# Patient Record
Sex: MALE | Race: WHITE | NOT HISPANIC OR LATINO | Employment: OTHER | URBAN - NONMETROPOLITAN AREA
[De-identification: names, ages, dates, MRNs, and addresses within clinical notes are randomized per-mention and may not be internally consistent; named-entity substitution may affect disease eponyms.]

---

## 2023-03-27 DIAGNOSIS — E11.69 TYPE 2 DIABETES MELLITUS WITH OTHER SPECIFIED COMPLICATION, UNSPECIFIED WHETHER LONG TERM INSULIN USE (MULTI): Primary | ICD-10-CM

## 2023-03-27 NOTE — TELEPHONE ENCOUNTER
Patient stopped in to the office stating thst he needs his Free Style Librte 2 sensors sent to [facility] in [place]

## 2023-03-28 RX ORDER — FLASH GLUCOSE SENSOR
KIT MISCELLANEOUS
Qty: 1 EACH | Refills: 11 | Status: CANCELLED | OUTPATIENT
Start: 2023-03-28

## 2023-03-28 RX ORDER — FLASH GLUCOSE SENSOR
KIT MISCELLANEOUS AS NEEDED
COMMUNITY
Start: 2023-03-24 | End: 2023-04-07 | Stop reason: SDUPTHER

## 2023-04-07 RX ORDER — FLASH GLUCOSE SENSOR
1 KIT MISCELLANEOUS AS NEEDED
Qty: 1 EACH | Refills: 0 | Status: SHIPPED | OUTPATIENT
Start: 2023-04-07 | End: 2023-04-13 | Stop reason: SDUPTHER

## 2023-04-13 ENCOUNTER — OFFICE VISIT (OUTPATIENT)
Dept: PRIMARY CARE | Facility: CLINIC | Age: 73
End: 2023-04-13
Payer: MEDICARE

## 2023-04-13 VITALS
TEMPERATURE: 96.8 F | OXYGEN SATURATION: 99 % | HEART RATE: 96 BPM | BODY MASS INDEX: 27.55 KG/M2 | HEIGHT: 69 IN | DIASTOLIC BLOOD PRESSURE: 80 MMHG | RESPIRATION RATE: 18 BRPM | SYSTOLIC BLOOD PRESSURE: 122 MMHG | WEIGHT: 186 LBS

## 2023-04-13 DIAGNOSIS — D64.9 ANEMIA, UNSPECIFIED TYPE: ICD-10-CM

## 2023-04-13 DIAGNOSIS — Z00.00 HEALTH CARE MAINTENANCE: ICD-10-CM

## 2023-04-13 DIAGNOSIS — Z12.11 SCREENING FOR COLON CANCER: ICD-10-CM

## 2023-04-13 DIAGNOSIS — E11.69 TYPE 2 DIABETES MELLITUS WITH OTHER SPECIFIED COMPLICATION, UNSPECIFIED WHETHER LONG TERM INSULIN USE (MULTI): ICD-10-CM

## 2023-04-13 DIAGNOSIS — I10 ESSENTIAL HYPERTENSION: ICD-10-CM

## 2023-04-13 DIAGNOSIS — M10.9 GOUT, ARTHRITIS: ICD-10-CM

## 2023-04-13 DIAGNOSIS — Z95.1 S/P CABG (CORONARY ARTERY BYPASS GRAFT): Primary | ICD-10-CM

## 2023-04-13 DIAGNOSIS — I25.119 CORONARY ARTERY DISEASE INVOLVING NATIVE CORONARY ARTERY OF NATIVE HEART WITH ANGINA PECTORIS (CMS-HCC): ICD-10-CM

## 2023-04-13 PROCEDURE — 3074F SYST BP LT 130 MM HG: CPT | Performed by: INTERNAL MEDICINE

## 2023-04-13 PROCEDURE — 1159F MED LIST DOCD IN RCRD: CPT | Performed by: INTERNAL MEDICINE

## 2023-04-13 PROCEDURE — 3008F BODY MASS INDEX DOCD: CPT | Performed by: INTERNAL MEDICINE

## 2023-04-13 PROCEDURE — 3079F DIAST BP 80-89 MM HG: CPT | Performed by: INTERNAL MEDICINE

## 2023-04-13 PROCEDURE — 4010F ACE/ARB THERAPY RXD/TAKEN: CPT | Performed by: INTERNAL MEDICINE

## 2023-04-13 PROCEDURE — 1036F TOBACCO NON-USER: CPT | Performed by: INTERNAL MEDICINE

## 2023-04-13 PROCEDURE — 1160F RVW MEDS BY RX/DR IN RCRD: CPT | Performed by: INTERNAL MEDICINE

## 2023-04-13 PROCEDURE — 99214 OFFICE O/P EST MOD 30 MIN: CPT | Performed by: INTERNAL MEDICINE

## 2023-04-13 RX ORDER — ALLOPURINOL 100 MG/1
1 TABLET ORAL DAILY
COMMUNITY
Start: 2023-01-13 | End: 2023-04-14 | Stop reason: ALTCHOICE

## 2023-04-13 RX ORDER — ISOSORBIDE MONONITRATE 120 MG/1
1 TABLET, EXTENDED RELEASE ORAL DAILY
COMMUNITY
Start: 2021-01-25 | End: 2023-04-14 | Stop reason: ALTCHOICE

## 2023-04-13 RX ORDER — CLOPIDOGREL BISULFATE 75 MG/1
1 TABLET ORAL DAILY
COMMUNITY
Start: 2023-03-16 | End: 2023-12-18 | Stop reason: SDUPTHER

## 2023-04-13 RX ORDER — FLASH GLUCOSE SCANNING READER
EACH MISCELLANEOUS
Qty: 1 EACH | Refills: 0 | Status: SHIPPED | OUTPATIENT
Start: 2023-04-13

## 2023-04-13 RX ORDER — LOSARTAN POTASSIUM AND HYDROCHLOROTHIAZIDE 25; 100 MG/1; MG/1
1 TABLET ORAL DAILY
COMMUNITY
End: 2023-04-14 | Stop reason: ALTCHOICE

## 2023-04-13 RX ORDER — LISINOPRIL 40 MG/1
40 TABLET ORAL DAILY
COMMUNITY
Start: 2023-03-03 | End: 2023-07-27 | Stop reason: ALTCHOICE

## 2023-04-13 RX ORDER — METFORMIN HYDROCHLORIDE 1000 MG/1
TABLET ORAL 2 TIMES DAILY
COMMUNITY
Start: 2019-10-15 | End: 2023-08-14 | Stop reason: ALTCHOICE

## 2023-04-13 RX ORDER — INSULIN LISPRO 100 [IU]/ML
INJECTION, SOLUTION INTRAVENOUS; SUBCUTANEOUS
COMMUNITY
Start: 2023-01-23 | End: 2023-11-28 | Stop reason: SDUPTHER

## 2023-04-13 RX ORDER — FUROSEMIDE 40 MG/1
40 TABLET ORAL DAILY
COMMUNITY
End: 2023-04-14 | Stop reason: ALTCHOICE

## 2023-04-13 RX ORDER — FLASH GLUCOSE SENSOR
1 KIT MISCELLANEOUS AS NEEDED
Qty: 1 EACH | Refills: 0 | Status: SHIPPED | OUTPATIENT
Start: 2023-04-13 | End: 2023-11-03 | Stop reason: SDUPTHER

## 2023-04-13 RX ORDER — NAPROXEN SODIUM 220 MG/1
TABLET, FILM COATED ORAL DAILY
COMMUNITY
Start: 2022-10-18

## 2023-04-13 RX ORDER — METOPROLOL SUCCINATE 200 MG/1
1 TABLET, EXTENDED RELEASE ORAL DAILY
COMMUNITY
Start: 2023-03-16 | End: 2023-12-18 | Stop reason: SDUPTHER

## 2023-04-13 RX ORDER — COLCHICINE 0.6 MG/1
0.6 TABLET ORAL DAILY
COMMUNITY
Start: 2023-01-13 | End: 2023-04-14 | Stop reason: ALTCHOICE

## 2023-04-13 RX ORDER — ATORVASTATIN CALCIUM 80 MG/1
80 TABLET, FILM COATED ORAL NIGHTLY
COMMUNITY
Start: 2023-03-16 | End: 2023-10-26 | Stop reason: SDUPTHER

## 2023-04-13 RX ORDER — INSULIN GLARGINE 100 [IU]/ML
INJECTION, SOLUTION SUBCUTANEOUS
COMMUNITY
Start: 2023-01-23 | End: 2023-07-10

## 2023-04-13 ASSESSMENT — PAIN SCALES - GENERAL: PAINLEVEL: 0-NO PAIN

## 2023-04-13 NOTE — PROGRESS NOTES
"Subjective   Patient ID:   Patel Goncalves is a 73 y.o. male, history of DM II, HLD, HTN, who presents for routine follow up     CAD s/p CABG  - patient initially presented to ER on 10/17/22 with intermittent chest pain, ongoing for a week  - pain described as \"achy\", on right substernal chest pain, worsening on exertion and better with rest  - EKG at the time noted LBBB, however troponin was elevated to 156. CXR unremarkable, COVID test was negative, troponin was down to 113, concerning for prior cardiac event, patient was then hospitalized for concern of NSTEMI  - patient with echo noted LVEF 40-45%, impaired relaxation pattern of LV diastolic filling, with mild AV stenosis, Mild MR, TR, and SC, and noted global hypokinesis of the LV with minor regional variations  - had LCH by cardiologist, showed LM 60%, pLAD 70%, oD1 90%, mLAD  (R-->L collaterals, some L-->L collaterals), pLCx 70% mLCX , (no collaterals), OM1 90%\"   - as per Dr. Zazueta , CABG was recommended and patient transferred to [facility] on 10/18/22  - patient then admitted to the hospital on 11/8/22 , had CABG x4 LIMA-LAD, SVG-OM1-OM2, SVG to PLV and posterior pericardial window, performed by Dr. Trujlilo.   - on asa 81 mg every day  - on plavix 75 mg every day  - on atorvastatin 80 mg every day   - on metoprolol 200 mg every day   - on spironolactone 25 mg BID  - currently not taking lisinopril, although the medication was listed to be taken on last visit on 3/2023 by Dr. Bhakta     Iron deficiency  - on Ferrex 150 mg every day    DM II  - on metformin 1000 mg BID  - on Lantus 40 units at bedtime  - on insulin lispro 8 units with sliding scale   - have CGM however patient accidentally washed the reader in the washing machine  - currently it's broken and not working, although it was placed on gentle cycle     All other (10) organ systems have been reviewed, negative for significant complaint and no change from baseline other than mentioned as per HPI " above.    Patient Active Problem List   Diagnosis    Diabetes mellitus, type II (CMS/[facility])    Essential hypertension    Gout, arthritis    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 pectoris (CMS/[facility])    S/P CABG (coronary artery bypass graft)    Other hyperlipidemia    Health care maintenance    Anemia        History reviewed. No pertinent surgical history.    No family history on file.    Social History    reports that he has never smoked. He has never used smokeless tobacco. He reports that he does not currently use alcohol. He reports that he does not currently use drugs.  Allergies   Allergen Reactions    Ibuprofen Itching    Naproxen Sodium Itching       Medication Documentation Review Audit       Reviewed by Ghanshyam Burton MD (Physician) on 04/14/23 at 1944      Medication Order Taking? Sig Documenting Provider Last Dose Status   Discontinued 04/14/23 1933   aspirin 81 mg chewable tablet [id number] Yes Chew. Historical Provider, MD Taking Active   atorvastatin (Lipitor) 80 mg tablet [id number] Yes Take 1 tablet (80 mg) by mouth once daily at bedtime. Historical Provider, MD Taking Active   clopidogrel (Plavix) 75 mg tablet [id number] Yes Take 1 tablet (75 mg) by mouth once daily. Historical Provider, MD Taking Active   Discontinued 04/14/23 1934   Ferrex 150 150 mg iron capsule [id number] Yes Take 1 capsule (150 mg) by mouth once daily. Historical Provider, MD  Active   Discontinued 04/13/23 1225   FreeStyle Evy 2 Sensor kit [id number]  Inject 1 each under the skin if needed (glucose check). Ghanshyam Burton MD  Active   FreeStyle Evy reader (FreeStyle Evy 2 [place]) misc [id number]  Use as instructed Ghanshyam Burton MD  Active   Discontinued 04/14/23 1934   insulin lispro (HumaLOG) 100 unit/mL injection [id number] Yes INJECT 8 UNITS SUBCUTANEOUSLY 3 TIMES DAILY PLUS SLIDING SCALE. 151-200, 2 UNITS. 201-250, 4 UNITS. 251-300, 6 UNITS. 301-350, 8 UNITS. 351-400, 10  "UNITS. MAX MEAL DOSE 18 UNITS. MAX DAILY 54 UNITS Kizzy Cronin MD Taking Active   Discontinued 04/14/23 1934   Lantus Solostar U-100 Insulin 100 unit/mL (3 mL) pen [id number] Yes INJECT 35 UNITS SUBCUTANEOUSLY AT BEDTIME Kizzy Cronin MD Taking Active   lisinopril 40 mg tablet [id number]  Take 1 tablet (40 mg) by mouth once daily. for blood pressure Kizzy Cronin MD  Active   Discontinued 04/14/23 1934   metFORMIN (Glucophage) 1,000 mg tablet [id number] Yes Take by mouth twice a day. Kizzy Cronin MD Taking Active   metoprolol succinate XL (Toprol-XL) 200 mg 24 hr tablet [id number] Yes Take 1 tablet (200 mg) by mouth once daily. Kizzy Cronin MD Taking Active   spironolactone (Aldactone) 25 mg tablet [id number] Yes Take 1 tablet (25 mg) by mouth in the morning and 1 tablet (25 mg) before bedtime. Kizzy Cronin MD  Active                     Objective       5/9/2022     9:18 AM 10/4/2022     9:17 AM 10/11/2022    10:33 AM 12/2/2022    10:05 AM 12/21/2022     3:29 PM 12/21/2022     3:41 PM 4/13/2023    11:51 AM   Vitals   Systolic 180 142 136 145 185 161 122   Diastolic 100 88 84 89 110 90 80   Heart Rate 70  79 89 96  96   Temp 36.4 °C (97.6 °F) 36.2 °C (97.2 °F) 36.8 °C (98.2 °F)    36 °C (96.8 °F)   Resp  18 18    18   Height (in) 1.702 m (5' 7\") 1.702 m (5' 7\") 1.702 m (5' 7\") 1.702 m (5' 7\") 1.676 m (5' 6\")  1.753 m (5' 9\")   Weight (lb) 199.13 197 196.5 198.41 198.13  186   BMI 31.19 kg/m2 30.85 kg/m2 30.78 kg/m2 31.08 kg/m2 31.98 kg/m2  27.47 kg/m2   BSA (m2) 2.07 m2 2.06 m2 2.05 m2 2.06 m2 2.05 m2  2.03 m2   Visit Report       Report     Physical Exam  Constitutional:       General: He is not in acute distress.     Appearance: Normal appearance. He is not ill-appearing or toxic-appearing.   HENT:      Head: Normocephalic.   Eyes:      Extraocular Movements: Extraocular movements intact.      Conjunctiva/sclera: Conjunctivae normal.   Cardiovascular:      Rate and Rhythm: Normal " rate and regular rhythm.      Heart sounds: Normal heart sounds. No murmur heard.     No friction rub. No gallop.   Pulmonary:      Effort: Pulmonary effort is normal. No respiratory distress.      Breath sounds: Normal breath sounds. No stridor. No wheezing, rhonchi or rales.   Abdominal:      General: Abdomen is flat. There is no distension.      Palpations: Abdomen is soft.      Tenderness: There is no abdominal tenderness. There is no guarding.   Musculoskeletal:         General: Normal range of motion.      Right lower leg: No edema.      Left lower leg: No edema.   Skin:     General: Skin is warm and dry.   Neurological:      General: No focal deficit present.      Mental Status: He is alert. Mental status is at baseline.         Assessment/Plan     Patel Goncalves is a 73 y.o. male, history of DM II, HLD, HTN, who presents for routine follow up     Problem List Items Addressed This Visit       Diabetes mellitus, type II (CMS/HCC)      on metformin 1000 mg BID  - on Lantus 40 units at bedtime  - on insulin lispro 8 units with sliding scale   -   Lab Results   Component Value Date    HGBA1C 9.9 (A) 10/04/2022     - follow up A1C   - UACR elevated up to ~240s on 10/22, consider farxiga pending A1C level   - monofilament unremarkable on visit 10/4/22  - will trial of resent CGM   - f/u optometrist yearly          Relevant Medications    FreeStyle Evy reader (FreeStyle Evy 2 [place]) misc    FreeStyle Evy 2 Sensor kit    Other Relevant Orders    Hemoglobin A1c    Essential hypertension    Gout, arthritis     - Currently off medication   - shana observe          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 pectoris (CMS/HCC)    Relevant Medications    clopidogrel (Plavix) 75 mg tablet    metoprolol succinate XL (Toprol-XL) 200 mg 24 hr tablet    S/P CABG (coronary artery bypass graft) - Primary     - c/w asa 81 mg every day  - c/w plavix 75 mg every day  - c/w  atorvastatin 80 mg every day   - c/w metoprolol 200 mg every day   - c/w spironolactone 25 mg BID         Relevant Orders    CBC and Auto Differential    Comprehensive Metabolic Panel    Health care maintenance     MWV 5/2022, physical 10/4/22  Aspirin for primary/secondary prophylaxis: as above  Diabetes Screening/monitoring : ordered A1C  Lipid screening: LDL < 70 on lab 10/4/22  STD/HIV Screening: declined   Lung Cancer Screening: never smoker  Hepatitis C Screening: negative 10/2022  PSA discussion: out of age range  AAA Screening: never smoker  Colonoscopy: was done by Dr. Carey in [place], had small polyp removed, ordered   Vaccination: s/p 2x COVID , declined flu, pneumonia, shingrix, declined COVID booster          Anemia     - c/w iron supplement for now  - if remained to be anemic consider iron panel          Relevant Orders    CBC and Auto Differential     Other Visit Diagnoses       Screening for colon cancer        Relevant Orders    Colonoscopy          Follow up as noted below  Future Appointments   Date Time Provider Department Center   7/13/2023  8:10 AM Ghanshyam Burton MD [address]

## 2023-04-14 PROBLEM — D64.9 ANEMIA: Status: ACTIVE | Noted: 2023-04-14

## 2023-04-14 PROBLEM — Z00.00 HEALTH CARE MAINTENANCE: Status: ACTIVE | Noted: 2023-04-14

## 2023-04-14 PROBLEM — E78.49 OTHER HYPERLIPIDEMIA: Status: ACTIVE | Noted: 2023-04-14

## 2023-04-14 RX ORDER — IRON POLYSACCHARIDE COMPLEX 150 MG
1 CAPSULE ORAL DAILY
COMMUNITY
Start: 2022-11-13

## 2023-04-14 RX ORDER — SPIRONOLACTONE 25 MG/1
1 TABLET ORAL 2 TIMES DAILY
COMMUNITY
Start: 2023-03-03 | End: 2023-08-14 | Stop reason: ALTCHOICE

## 2023-04-14 NOTE — ASSESSMENT & PLAN NOTE
on metformin 1000 mg BID  - on Lantus 40 units at bedtime  - on insulin lispro 8 units with sliding scale   -   Lab Results   Component Value Date    HGBA1C 9.9 (A) 10/04/2022     - follow up A1C   - UACR elevated up to ~240s on 10/22, consider farxiga pending A1C level   - monofilament unremarkable on visit 10/4/22  - will trial of resent CGM   - f/u optometrist yearly

## 2023-04-14 NOTE — ASSESSMENT & PLAN NOTE
MWV 5/2022, physical 10/4/22  Aspirin for primary/secondary prophylaxis: as above  Diabetes Screening/monitoring : ordered A1C  Lipid screening: LDL < 70 on lab 10/4/22  STD/HIV Screening: declined   Lung Cancer Screening: never smoker  Hepatitis C Screening: negative 10/2022  PSA discussion: out of age range  AAA Screening: never smoker  Colonoscopy: was done by Dr. Carey in [place], had small polyp removed, ordered   Vaccination: s/p 2x COVID , declined flu, pneumonia, shingrix, declined COVID booster

## 2023-04-14 NOTE — ASSESSMENT & PLAN NOTE
- c/w asa 81 mg every day  - c/w plavix 75 mg every day  - c/w atorvastatin 80 mg every day   - c/w metoprolol 200 mg every day   - c/w spironolactone 25 mg BID

## 2023-07-09 DIAGNOSIS — Z79.4 TYPE 2 DIABETES MELLITUS WITH OTHER SPECIFIED COMPLICATION, WITH LONG-TERM CURRENT USE OF INSULIN (MULTI): Primary | ICD-10-CM

## 2023-07-09 DIAGNOSIS — E11.69 TYPE 2 DIABETES MELLITUS WITH OTHER SPECIFIED COMPLICATION, WITH LONG-TERM CURRENT USE OF INSULIN (MULTI): Primary | ICD-10-CM

## 2023-07-10 RX ORDER — INSULIN GLARGINE 100 [IU]/ML
INJECTION, SOLUTION SUBCUTANEOUS
Qty: 15 ML | Refills: 0 | Status: SHIPPED | OUTPATIENT
Start: 2023-07-10 | End: 2023-09-01 | Stop reason: SDUPTHER

## 2023-07-13 ENCOUNTER — OFFICE VISIT (OUTPATIENT)
Dept: PRIMARY CARE | Facility: CLINIC | Age: 73
End: 2023-07-13
Payer: MEDICARE

## 2023-07-13 VITALS
WEIGHT: 189 LBS | SYSTOLIC BLOOD PRESSURE: 120 MMHG | HEART RATE: 94 BPM | OXYGEN SATURATION: 98 % | RESPIRATION RATE: 18 BRPM | BODY MASS INDEX: 30.51 KG/M2 | DIASTOLIC BLOOD PRESSURE: 80 MMHG

## 2023-07-13 DIAGNOSIS — D64.9 ANEMIA, UNSPECIFIED TYPE: ICD-10-CM

## 2023-07-13 DIAGNOSIS — M10.9 GOUT, ARTHRITIS: ICD-10-CM

## 2023-07-13 DIAGNOSIS — E11.69 TYPE 2 DIABETES MELLITUS WITH OTHER SPECIFIED COMPLICATION, WITH LONG-TERM CURRENT USE OF INSULIN (MULTI): ICD-10-CM

## 2023-07-13 DIAGNOSIS — E78.49 OTHER HYPERLIPIDEMIA: ICD-10-CM

## 2023-07-13 DIAGNOSIS — Z79.4 TYPE 2 DIABETES MELLITUS WITH OTHER SPECIFIED COMPLICATION, WITH LONG-TERM CURRENT USE OF INSULIN (MULTI): ICD-10-CM

## 2023-07-13 DIAGNOSIS — Z00.00 HEALTH CARE MAINTENANCE: Primary | ICD-10-CM

## 2023-07-13 DIAGNOSIS — Z95.1 S/P CABG (CORONARY ARTERY BYPASS GRAFT): ICD-10-CM

## 2023-07-13 DIAGNOSIS — R00.0 TACHYCARDIA: ICD-10-CM

## 2023-07-13 PROBLEM — R80.9 PROTEINURIA: Status: ACTIVE | Noted: 2023-07-13

## 2023-07-13 PROBLEM — I50.43 ACUTE ON CHRONIC COMBINED SYSTOLIC AND DIASTOLIC HEART FAILURE (MULTI): Status: ACTIVE | Noted: 2023-07-13

## 2023-07-13 PROBLEM — S49.92XA INJURY OF LEFT SHOULDER: Status: ACTIVE | Noted: 2023-07-13

## 2023-07-13 PROBLEM — M25.562 LEFT KNEE PAIN: Status: ACTIVE | Noted: 2023-07-13

## 2023-07-13 PROBLEM — D50.9 IRON DEFICIENCY ANEMIA: Status: ACTIVE | Noted: 2023-07-13

## 2023-07-13 PROBLEM — S43.109A: Status: ACTIVE | Noted: 2023-07-13

## 2023-07-13 PROBLEM — I73.9 CLAUDICATION OF LOWER EXTREMITY (CMS-HCC): Status: ACTIVE | Noted: 2023-07-13

## 2023-07-13 PROBLEM — R79.89 ELEVATED SERUM CREATININE: Status: ACTIVE | Noted: 2023-07-13

## 2023-07-13 PROBLEM — M25.462 EFFUSION OF LEFT PREPATELLAR BURSA: Status: ACTIVE | Noted: 2023-07-13

## 2023-07-13 PROCEDURE — 3008F BODY MASS INDEX DOCD: CPT | Performed by: INTERNAL MEDICINE

## 2023-07-13 PROCEDURE — 99214 OFFICE O/P EST MOD 30 MIN: CPT | Performed by: INTERNAL MEDICINE

## 2023-07-13 PROCEDURE — 1159F MED LIST DOCD IN RCRD: CPT | Performed by: INTERNAL MEDICINE

## 2023-07-13 PROCEDURE — 3074F SYST BP LT 130 MM HG: CPT | Performed by: INTERNAL MEDICINE

## 2023-07-13 PROCEDURE — 1126F AMNT PAIN NOTED NONE PRSNT: CPT | Performed by: INTERNAL MEDICINE

## 2023-07-13 PROCEDURE — 1036F TOBACCO NON-USER: CPT | Performed by: INTERNAL MEDICINE

## 2023-07-13 PROCEDURE — 4010F ACE/ARB THERAPY RXD/TAKEN: CPT | Performed by: INTERNAL MEDICINE

## 2023-07-13 PROCEDURE — 1160F RVW MEDS BY RX/DR IN RCRD: CPT | Performed by: INTERNAL MEDICINE

## 2023-07-13 PROCEDURE — 3079F DIAST BP 80-89 MM HG: CPT | Performed by: INTERNAL MEDICINE

## 2023-07-13 RX ORDER — PEN NEEDLE, DIABETIC 30 GX3/16"
NEEDLE, DISPOSABLE MISCELLANEOUS
Qty: 400 EACH | Refills: 3 | Status: SHIPPED | OUTPATIENT
Start: 2023-07-13 | End: 2023-09-01 | Stop reason: SDUPTHER

## 2023-07-13 RX ORDER — LOSARTAN POTASSIUM 100 MG/1
100 TABLET ORAL DAILY
Qty: 30 TABLET | Refills: 5 | Status: SHIPPED | OUTPATIENT
Start: 2023-07-13 | End: 2023-07-27 | Stop reason: ALTCHOICE

## 2023-07-13 RX ORDER — PEN NEEDLE, DIABETIC 30 GX3/16"
NEEDLE, DISPOSABLE MISCELLANEOUS
COMMUNITY
Start: 2023-07-06 | End: 2023-07-13 | Stop reason: SDUPTHER

## 2023-07-13 RX ORDER — FUROSEMIDE 40 MG/1
1 TABLET ORAL DAILY
COMMUNITY
Start: 2022-11-13 | End: 2023-10-25 | Stop reason: SDUPTHER

## 2023-07-13 NOTE — PROGRESS NOTES
"Subjective   Patient ID:   Patel Goncalves is a 73 y.o. male, history of DM II, HLD, HTN, who presents for Follow-up    CAD s/p CABG  - patient initially presented to ER on 10/17/22 with intermittent chest pain, ongoing for a week  - pain described as \"achy\", on right substernal chest pain, worsening on exertion and better with rest  - EKG at the time noted LBBB, however troponin was elevated to 156. CXR unremarkable, COVID test was negative, troponin was down to 113, concerning for prior cardiac event, patient was then hospitalized for concern of NSTEMI  - patient with echo noted LVEF 40-45%, impaired relaxation pattern of LV diastolic filling, with mild AV stenosis, Mild MR, TR, and MS, and noted global hypokinesis of the LV with minor regional variations  - had LCH by cardiologist, showed LM 60%, pLAD 70%, oD1 90%, mLAD  (R-->L collaterals, some L-->L collaterals), pLCx 70% mLCX , (no collaterals), OM1 90%\"   - as per Dr. Zazueta , CABG was recommended and patient transferred to [facility] on 10/18/22  - patient then admitted to the hospital on 11/8/22 , had CABG x4 LIMA-LAD, SVG-OM1-OM2, SVG to PLV and posterior pericardial window, performed by Dr. Trujillo.   - on asa 81 mg every day  - on plavix 75 mg every day  - on atorvastatin 80 mg every day   - on metoprolol 200 mg every day   - on spironolactone 25 mg BID  - on furosemide 40 mg every day , instructed can taper every other day   - on lisinopril 40 mg every day , however patient c/o dry cough   - last visit on 6/23, by Dr. Bhakta , follow up in 1 year     HLD  - on atorvastatin 80 mg every day     Intermittent tachycardia  - currently on ziopatch, ordered by cardiologist, been on for a week    Iron deficiency  - on Ferrex 150 mg every day    DM II  - on metformin 1000 mg BID  - on Lantus 40 units at bedtime, most of the time, occasionally skipped as patient concern of hypoglycemia  - on insulin lispro 8 units with sliding scale , take three times a day with " meal, however occasionally skipped or taken too late, as patient doesn't always have his pen with him  - average in the past 14 days in 165  -  the last 14 days patient with 2 low glucose event (<50)  - in the past 14 days patient in target 62%, patient is above 37%, below at 1% of the time    All other (10) organ systems have been reviewed, negative for significant complaint and no change from baseline other than mentioned as per HPI above.    Patient Active Problem List   Diagnosis    Diabetes mellitus, type II (CMS/[facility])    Gout, arthritis    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 pectoris (CMS/[facility])    S/P CABG (coronary artery bypass graft)    Other hyperlipidemia    Health care maintenance    Anemia    Acute on chronic combined systolic and diastolic heart failure (CMS/[facility])    Claudication of lower extremity (CMS/[facility])    Effusion of left prepatellar bursa    Elevated serum creatinine    Injury of left shoulder    Iron deficiency anemia    Left knee pain    Proteinuria    Separation of acromioclavicular joint    Tachycardia        History reviewed. No pertinent surgical history.    No family history on file.    Social History    reports that he has never smoked. He has never used smokeless tobacco. He reports that he does not currently use alcohol. He reports that he does not currently use drugs.  Allergies   Allergen Reactions    Ibuprofen Itching    Naproxen Sodium Itching       Medication Documentation Review Audit       Reviewed by Ghanshyam Burton MD (Physician) on 07/13/23 at 0942      Medication Order Taking? Sig Documenting Provider Last Dose Status   aspirin 81 mg chewable tablet [id number] Yes Chew. Historical Provider, MD Taking Active   atorvastatin (Lipitor) 80 mg tablet [id number] Yes Take 1 tablet (80 mg) by mouth once daily at bedtime. Historical Provider, MD Taking Active   clopidogrel (Plavix) 75 mg tablet [id number] Yes Take 1 tablet (75 mg) by mouth  "once daily. Kizzy Cronin MD Taking Active   Ferrex 150 150 mg iron capsule [id number] Yes Take 1 capsule (150 mg) by mouth once daily. Kizzy Cronin MD Taking Active   FreeStyle Evy 2 Sensor kit [id number] Yes Inject 1 each under the skin if needed (glucose check). Ghanshyam Burton MD Taking Active   FreeStyle Evy reader (FreeStyle Evy 2 [place]) misc [id number] Yes Use as instructed Ghanshyam Burton MD Taking Active   furosemide (Lasix) 40 mg tablet [id number] Yes Take 1 tablet (40 mg) by mouth once daily. Kizzy rConin MD  Active   insulin lispro (HumaLOG) 100 unit/mL injection [id number] Yes INJECT 8 UNITS SUBCUTANEOUSLY 3 TIMES DAILY PLUS SLIDING SCALE. 151-200, 2 UNITS. 201-250, 4 UNITS. 251-300, 6 UNITS. 301-350, 8 UNITS. 351-400, 10 UNITS. MAX MEAL DOSE 18 UNITS. MAX DAILY 54 UNITS Kizzy Cronin MD Taking Active     Discontinued 07/10/23 0814   Lantus Solostar U-100 Insulin 100 unit/mL (3 mL) pen [id number] Yes INJECT 35 UNITS SUBCUTANEOUSLY AT BEDTIME Ghanshyam Burton MD Taking Active   lisinopril 40 mg tablet [id number] Yes Take 1 tablet (40 mg) by mouth once daily. for blood pressure Kizzy Cronin MD Taking Active   losartan (Cozaar) 100 mg tablet [id number]  Take 1 tablet (100 mg) by mouth once daily. Ghanshyam Burton MD  Active   metFORMIN (Glucophage) 1,000 mg tablet [id number] Yes Take by mouth twice a day. Kizzy Cronin MD Taking Active   metoprolol succinate XL (Toprol-XL) 200 mg 24 hr tablet [id number] Yes Take 1 tablet (200 mg) by mouth once daily. Kizzy Cronin MD Taking Active    Discontinued 07/13/23 0850   pen needle, diabetic 31 gauge x 5/16\" needle [id number]  Use with insulin for DM II Ghanshyam Burton MD  Active   spironolactone (Aldactone) 25 mg tablet [id number] Yes Take 1 tablet (25 mg) by mouth 2 times a day. Kizzy Cronin MD Taking Active                     Objective       1/6/2023    10:52 AM 1/13/2023     1:01 PM 1/20/2023     2:21 PM " "3/3/2023    10:09 AM 4/13/2023    11:51 AM 5/26/2023     8:51 AM 7/13/2023     8:11 AM   Vitals   Systolic 139 151 156 152 122 129 120   Diastolic 92 87 98 87 80 79 80   Heart Rate 96 86 88 84 96 81 94   Temp     36 °C (96.8 °F)     Resp     18  18   Height (in) 1.676 m (5' 6\") 1.676 m (5' 6\") 1.676 m (5' 6\") 1.676 m (5' 6\") 1.753 m (5' 9\") 1.676 m (5' 6\")    Weight (lb) 201.5 201.72 194.22 186.73 186 181.22 189   BMI 32.52 kg/m2 32.56 kg/m2 31.35 kg/m2 30.14 kg/m2 27.47 kg/m2 29.25 kg/m2 30.51 kg/m2   BSA (m2) 2.06 m2 2.06 m2 2.03 m2 1.99 m2 2.03 m2 1.96 m2 2 m2   Visit Report     Report  Report     Physical Exam  Constitutional:       General: He is not in acute distress.     Appearance: Normal appearance. He is not ill-appearing or toxic-appearing.   HENT:      Head: Normocephalic.   Eyes:      Extraocular Movements: Extraocular movements intact.      Conjunctiva/sclera: Conjunctivae normal.   Cardiovascular:      Rate and Rhythm: Normal rate and regular rhythm.      Heart sounds: Normal heart sounds. No murmur heard.     No friction rub. No gallop.   Pulmonary:      Effort: Pulmonary effort is normal. No respiratory distress.      Breath sounds: Normal breath sounds. No stridor. No wheezing, rhonchi or rales.   Abdominal:      General: Abdomen is flat. There is no distension.      Palpations: Abdomen is soft.      Tenderness: There is no abdominal tenderness. There is no guarding.   Musculoskeletal:         General: Normal range of motion.      Right lower leg: No edema.      Left lower leg: No edema.   Skin:     General: Skin is warm and dry.   Neurological:      General: No focal deficit present.      Mental Status: He is alert. Mental status is at baseline.         Assessment/Plan     Patel Goncalves is a 73 y.o. male, history of DM II, HLD, HTN, who presents for routine follow up     Problem List Items Addressed This Visit       Diabetes mellitus, type II (CMS/HCC)      on metformin 1000 mg BID  - on " "Lantus 40 units at bedtime  - on insulin lispro 8 units with sliding scale   - reviewed CGM, advised to take short acting lispro more consistently, patient with good fasting glucose but with spikes  Lab Results   Component Value Date    HGBA1C 9.9 (A) 10/04/2022   - follow up A1C   - UACR elevated up to ~240s on 10/22, consider farxiga pending A1C level   - monofilament unremarkable on visit 10/4/22  - c/w CGM  - f/u optometrist yearly          Relevant Medications    pen needle, diabetic 31 gauge x 5/16\" needle    Other Relevant Orders    Hemoglobin A1c    CBC and Auto Differential    Comprehensive Metabolic Panel    Gout, arthritis     - Currently off medication   - shana observe   - follow up uric acid level          Relevant Orders    Uric acid    S/P CABG (coronary artery bypass graft)     - on asa 81 mg every day  - on plavix 75 mg every day  - on atorvastatin 80 mg every day   - on metoprolol 200 mg every day   - on spironolactone 25 mg BID  - will switch lisinopril 40 mg every day to losartan 100 mg every day because of cough  - will check CMP as noted below   - advised to follow up with cardiologist, Dr. Bhakta, last seen 6/2023, advised to follow up in 1 year   - can taper off furosemide 40 mg every day as instructed by Dr. Bhakta         Relevant Medications    losartan (Cozaar) 100 mg tablet    Other Relevant Orders    CBC and Auto Differential    Comprehensive Metabolic Panel    Other hyperlipidemia     - c/w atorvastatin 80 mg every day   - follow up lipid panel          Relevant Orders    Lipid Panel    Health care maintenance - Primary     MWV 5/2022, physical 10/4/22  Aspirin for primary/secondary prophylaxis: on DAPT  Diabetes Screening/monitoring : ordered A1C  Lipid screening: ordered  STD/HIV Screening: declined   Lung Cancer Screening: never smoker  Hepatitis C Screening: negative 10/2022  PSA discussion: out of age range  AAA Screening: never smoker  Colonoscopy: was done by Dr. Carey in [place], " had small polyp removed, ordered  on prior visit   Vaccination: s/p 2x COVID , declined flu, pneumonia, shingrix, declined COVID booster          Anemia     - c/w iron supplement for now  Lab Results   Component Value Date    WBC 8.4 11/30/2022    HGB 12.6 (L) 11/30/2022    HCT 41.3 11/30/2022    MCV 91 11/30/2022     11/30/2022   - follow up iron panel , B12, and folate as noted below          Relevant Orders    Vitamin B12    Folate    Ferritin    Iron and TIBC    Tachycardia     Currently on ziopatch  Regular on exam  Advised to follow up with cardiology           Follow advised in 3 months   No future appointments.

## 2023-07-13 NOTE — ASSESSMENT & PLAN NOTE
- on asa 81 mg every day  - on plavix 75 mg every day  - on atorvastatin 80 mg every day   - on metoprolol 200 mg every day   - on spironolactone 25 mg BID  - will switch lisinopril 40 mg every day to losartan 100 mg every day because of cough  - will check CMP as noted below   - advised to follow up with cardiologist, Dr. Bhakta, last seen 6/2023, advised to follow up in 1 year   - can taper off furosemide 40 mg every day as instructed by Dr. Bhakta

## 2023-07-13 NOTE — ASSESSMENT & PLAN NOTE
- c/w iron supplement for now  Lab Results   Component Value Date    WBC 8.4 11/30/2022    HGB 12.6 (L) 11/30/2022    HCT 41.3 11/30/2022    MCV 91 11/30/2022     11/30/2022   - follow up iron panel , B12, and folate as noted below

## 2023-07-13 NOTE — ASSESSMENT & PLAN NOTE
MWV 5/2022, physical 10/4/22  Aspirin for primary/secondary prophylaxis: on DAPT  Diabetes Screening/monitoring : ordered A1C  Lipid screening: ordered  STD/HIV Screening: declined   Lung Cancer Screening: never smoker  Hepatitis C Screening: negative 10/2022  PSA discussion: out of age range  AAA Screening: never smoker  Colonoscopy: was done by Dr. Carey in [place], had small polyp removed, ordered  on prior visit   Vaccination: s/p 2x COVID , declined flu, pneumonia, shingrix, declined COVID booster

## 2023-07-13 NOTE — PATIENT INSTRUCTIONS
I recommend blood work, please get it done in [facility], it will be in the system.   Stop taking lisinopril, this is the most likely reason for your cough, I will send replacement called Losartan (it will be different dose), however it is very similar to lisinopril except without the cough; make sure you don't take losartan and lisinopril together (it has potential for kidney failure)  Colonoscopy order is in place, please call and schedule  No change in insulin regimen

## 2023-07-13 NOTE — ASSESSMENT & PLAN NOTE
on metformin 1000 mg BID  - on Lantus 40 units at bedtime  - on insulin lispro 8 units with sliding scale   - reviewed CGM, advised to take short acting lispro more consistently, patient with good fasting glucose but with spikes  Lab Results   Component Value Date    HGBA1C 9.9 (A) 10/04/2022   - follow up A1C   - UACR elevated up to ~240s on 10/22, consider farxiga pending A1C level   - monofilament unremarkable on visit 10/4/22  - c/w CGM  - f/u optometrist yearly

## 2023-07-18 ENCOUNTER — LAB (OUTPATIENT)
Dept: LAB | Facility: LAB | Age: 73
End: 2023-07-18
Payer: MEDICARE

## 2023-07-18 DIAGNOSIS — M10.9 GOUT, ARTHRITIS: ICD-10-CM

## 2023-07-18 DIAGNOSIS — Z79.4 TYPE 2 DIABETES MELLITUS WITH OTHER SPECIFIED COMPLICATION, WITH LONG-TERM CURRENT USE OF INSULIN (MULTI): ICD-10-CM

## 2023-07-18 DIAGNOSIS — E11.69 TYPE 2 DIABETES MELLITUS WITH OTHER SPECIFIED COMPLICATION, WITH LONG-TERM CURRENT USE OF INSULIN (MULTI): ICD-10-CM

## 2023-07-18 DIAGNOSIS — Z95.1 S/P CABG (CORONARY ARTERY BYPASS GRAFT): ICD-10-CM

## 2023-07-18 DIAGNOSIS — E78.49 OTHER HYPERLIPIDEMIA: ICD-10-CM

## 2023-07-18 DIAGNOSIS — D64.9 ANEMIA, UNSPECIFIED TYPE: ICD-10-CM

## 2023-07-18 DIAGNOSIS — E11.69 TYPE 2 DIABETES MELLITUS WITH OTHER SPECIFIED COMPLICATION, UNSPECIFIED WHETHER LONG TERM INSULIN USE (MULTI): ICD-10-CM

## 2023-07-18 DIAGNOSIS — N18.9 CHRONIC KIDNEY DISEASE, UNSPECIFIED CKD STAGE: Primary | ICD-10-CM

## 2023-07-18 LAB
ALANINE AMINOTRANSFERASE (SGPT) (U/L) IN SER/PLAS: 25 U/L (ref 10–52)
ALBUMIN (G/DL) IN SER/PLAS: 4.2 G/DL (ref 3.4–5)
ALKALINE PHOSPHATASE (U/L) IN SER/PLAS: 60 U/L (ref 33–136)
ANION GAP IN SER/PLAS: 16 MMOL/L (ref 10–20)
ASPARTATE AMINOTRANSFERASE (SGOT) (U/L) IN SER/PLAS: 18 U/L (ref 9–39)
BASOPHILS (10*3/UL) IN BLOOD BY AUTOMATED COUNT: 0.02 X10E9/L (ref 0–0.1)
BASOPHILS/100 LEUKOCYTES IN BLOOD BY AUTOMATED COUNT: 0.3 % (ref 0–2)
BILIRUBIN TOTAL (MG/DL) IN SER/PLAS: 0.4 MG/DL (ref 0–1.2)
CALCIUM (MG/DL) IN SER/PLAS: 10 MG/DL (ref 8.6–10.3)
CARBON DIOXIDE, TOTAL (MMOL/L) IN SER/PLAS: 18 MMOL/L (ref 21–32)
CHLORIDE (MMOL/L) IN SER/PLAS: 110 MMOL/L (ref 98–107)
CHOLESTEROL (MG/DL) IN SER/PLAS: 86 MG/DL (ref 0–199)
CHOLESTEROL IN HDL (MG/DL) IN SER/PLAS: 36.2 MG/DL
CHOLESTEROL/HDL RATIO: 2.4
CREATININE (MG/DL) IN SER/PLAS: 1.71 MG/DL (ref 0.5–1.3)
EOSINOPHILS (10*3/UL) IN BLOOD BY AUTOMATED COUNT: 0.2 X10E9/L (ref 0–0.4)
EOSINOPHILS/100 LEUKOCYTES IN BLOOD BY AUTOMATED COUNT: 2.9 % (ref 0–6)
ERYTHROCYTE DISTRIBUTION WIDTH (RATIO) BY AUTOMATED COUNT: 13.5 % (ref 11.5–14.5)
ERYTHROCYTE MEAN CORPUSCULAR HEMOGLOBIN CONCENTRATION (G/DL) BY AUTOMATED: 31.1 G/DL (ref 32–36)
ERYTHROCYTE MEAN CORPUSCULAR VOLUME (FL) BY AUTOMATED COUNT: 97 FL (ref 80–100)
ERYTHROCYTES (10*6/UL) IN BLOOD BY AUTOMATED COUNT: 3.99 X10E12/L (ref 4.5–5.9)
FERRITIN (UG/LL) IN SER/PLAS: 124 UG/L (ref 20–300)
GFR MALE: 41 ML/MIN/1.73M2
GLUCOSE (MG/DL) IN SER/PLAS: 88 MG/DL (ref 74–99)
HEMATOCRIT (%) IN BLOOD BY AUTOMATED COUNT: 38.6 % (ref 41–52)
HEMOGLOBIN (G/DL) IN BLOOD: 12 G/DL (ref 13.5–17.5)
IMMATURE GRANULOCYTES/100 LEUKOCYTES IN BLOOD BY AUTOMATED COUNT: 0.3 % (ref 0–0.9)
IRON (UG/DL) IN SER/PLAS: 92 UG/DL (ref 35–150)
IRON BINDING CAPACITY (UG/DL) IN SER/PLAS: 298 UG/DL (ref 240–445)
IRON SATURATION (%) IN SER/PLAS: 31 % (ref 25–45)
LDL: 19 MG/DL (ref 0–99)
LEUKOCYTES (10*3/UL) IN BLOOD BY AUTOMATED COUNT: 6.9 X10E9/L (ref 4.4–11.3)
LYMPHOCYTES (10*3/UL) IN BLOOD BY AUTOMATED COUNT: 1.25 X10E9/L (ref 0.8–3)
LYMPHOCYTES/100 LEUKOCYTES IN BLOOD BY AUTOMATED COUNT: 18.1 % (ref 13–44)
MONOCYTES (10*3/UL) IN BLOOD BY AUTOMATED COUNT: 0.72 X10E9/L (ref 0.05–0.8)
MONOCYTES/100 LEUKOCYTES IN BLOOD BY AUTOMATED COUNT: 10.4 % (ref 2–10)
NEUTROPHILS (10*3/UL) IN BLOOD BY AUTOMATED COUNT: 4.71 X10E9/L (ref 1.6–5.5)
NEUTROPHILS/100 LEUKOCYTES IN BLOOD BY AUTOMATED COUNT: 68 % (ref 40–80)
PLATELETS (10*3/UL) IN BLOOD AUTOMATED COUNT: 171 X10E9/L (ref 150–450)
POTASSIUM (MMOL/L) IN SER/PLAS: 5.4 MMOL/L (ref 3.5–5.3)
PROTEIN TOTAL: 6.6 G/DL (ref 6.4–8.2)
SODIUM (MMOL/L) IN SER/PLAS: 139 MMOL/L (ref 136–145)
TRIGLYCERIDE (MG/DL) IN SER/PLAS: 155 MG/DL (ref 0–149)
URATE (MG/DL) IN SER/PLAS: 8.1 MG/DL (ref 4–7.5)
UREA NITROGEN (MG/DL) IN SER/PLAS: 43 MG/DL (ref 6–23)
VLDL: 31 MG/DL (ref 0–40)

## 2023-07-18 PROCEDURE — 83036 HEMOGLOBIN GLYCOSYLATED A1C: CPT

## 2023-07-18 PROCEDURE — 83540 ASSAY OF IRON: CPT

## 2023-07-18 PROCEDURE — 82746 ASSAY OF FOLIC ACID SERUM: CPT

## 2023-07-18 PROCEDURE — 83550 IRON BINDING TEST: CPT

## 2023-07-18 PROCEDURE — 82728 ASSAY OF FERRITIN: CPT

## 2023-07-18 PROCEDURE — 85025 COMPLETE CBC W/AUTO DIFF WBC: CPT

## 2023-07-18 PROCEDURE — 36415 COLL VENOUS BLD VENIPUNCTURE: CPT

## 2023-07-18 PROCEDURE — 84550 ASSAY OF BLOOD/URIC ACID: CPT

## 2023-07-18 PROCEDURE — 80053 COMPREHEN METABOLIC PANEL: CPT

## 2023-07-18 PROCEDURE — 80061 LIPID PANEL: CPT

## 2023-07-18 PROCEDURE — 82607 VITAMIN B-12: CPT

## 2023-07-19 LAB
COBALAMIN (VITAMIN B12) (PG/ML) IN SER/PLAS: 1072 PG/ML (ref 211–911)
ESTIMATED AVERAGE GLUCOSE FOR HBA1C: 148 MG/DL
FOLATE (NG/ML) IN SER/PLAS: >24 NG/ML
HEMOGLOBIN A1C/HEMOGLOBIN TOTAL IN BLOOD: 6.8 %

## 2023-07-19 NOTE — RESULT ENCOUNTER NOTE
Patient has significantly worsening kidney function, this needs to be repeated within a week, I ordered the lab, as well as referral to nephrology , uric acid is a bit high, but because of his decrease of kidney function I don't want to add any new medication at this time, his B12, Folate, and iron panel are normal, the anemia is stable, likely the anemia is from kidney disease, I advise to avoid NSAIDs (ibuprofen such as motrin/advil, naproxen such as aleve), please taper off the lasix (if not already done), patient should be seen by PCP in about a month

## 2023-07-20 PROBLEM — I73.9 CLAUDICATION OF LOWER EXTREMITY (CMS-HCC): Status: RESOLVED | Noted: 2023-07-13 | Resolved: 2023-07-20

## 2023-07-20 PROBLEM — I50.43 ACUTE ON CHRONIC COMBINED SYSTOLIC AND DIASTOLIC HEART FAILURE (MULTI): Status: RESOLVED | Noted: 2023-07-13 | Resolved: 2023-07-20

## 2023-07-20 NOTE — PROGRESS NOTES
Subjective   Patient ID:   Patel Goncalves is a 73 y.o. male who presents for Establish Care.  HPI  New patient here today to establish care with myself.  Last PCP: Dr. Burton  Last seen: Nov 2022    Elevated creatinine:  Creatinine was high at 1.71 on 7/18.  Needs this repeated.  Referred to nephrology - cannot get in until November.  DUE for labs.    Diabetes:  Taking Metformin, Lantus, Humalog.  Last A1c was 6.8 in July 2023.    CAD/HTN/HLD:  Seeing cardiology.  Taking Atorvastatin, Plavix, Lasix, Losartan, Metoprolol, Spironolactone.  BP today is 131/71.  Last lipid was July 2023.    Iron deficiency anemia:  Taking a supplement.  Last checked July 2023.    Left shoulder pain:  Seeing ortho.    Health maintenance:  Smoking: Never a smoker.  PSA (50+): DUE - declines  Labs: July 2023. DUE for CMP  Colonoscopy (50-75): April 2023.  Prevnar 13 (65+):  Pneumovax 23 (65+, 1 year after Prev 13):  Influenza:  Zostavax (60+, 1 time, at pharmacy):    Review of Systems  12 point review of systems negative unless stated above in HPI    Vitals:    07/27/23 1441   BP: 131/71   Pulse: 65   Resp: 18   SpO2: 97%       Physical Exam  General: Alert and oriented, well nourished, no acute distress.  Lungs: Clear to auscultation, non-labored respiration.  Heart: Normal rate, regular rhythm, no murmur, gallop or edema.  Neurologic: Awake, alert, and oriented X3, CN II-XII intact.  Psychiatric: Cooperative, appropriate mood and affect.    Assessment/Plan   It was nice meeting you!  I have ordered some labs to be done as soon as you can.  We will call you with the results.  Continue specialty care.  We may need to get you established with a nephrologist.  Continue the same medications.  Chronic conditions are stable.  Call with questions or concerns.    F/u  3-4 months  Diagnoses and all orders for this visit:  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 pectoris (CMS/HCC)  Type 2 diabetes  mellitus without complication, with long-term current use of insulin (CMS/[facility])  Iron deficiency anemia, unspecified iron deficiency anemia type  Other hyperlipidemia  Separation of left acromioclavicular joint, subsequent encounter  S/P CABG (coronary artery bypass graft)  Injury of left shoulder, subsequent encounter  Elevated serum creatinine  -     Comprehensive Metabolic Panel; Future  Screening PSA (prostate specific antigen)  Morbid (severe) obesity due to excess calories (CMS/[facility])

## 2023-07-24 ENCOUNTER — APPOINTMENT (OUTPATIENT)
Dept: PRIMARY CARE | Facility: CLINIC | Age: 73
End: 2023-07-24
Payer: MEDICARE

## 2023-07-27 ENCOUNTER — OFFICE VISIT (OUTPATIENT)
Dept: PRIMARY CARE | Facility: CLINIC | Age: 73
End: 2023-07-27
Payer: MEDICARE

## 2023-07-27 VITALS
WEIGHT: 190.8 LBS | RESPIRATION RATE: 18 BRPM | HEART RATE: 65 BPM | BODY MASS INDEX: 30.67 KG/M2 | HEIGHT: 66 IN | SYSTOLIC BLOOD PRESSURE: 131 MMHG | OXYGEN SATURATION: 97 % | DIASTOLIC BLOOD PRESSURE: 71 MMHG

## 2023-07-27 DIAGNOSIS — R79.89 ELEVATED SERUM CREATININE: ICD-10-CM

## 2023-07-27 DIAGNOSIS — Z95.1 S/P CABG (CORONARY ARTERY BYPASS GRAFT): ICD-10-CM

## 2023-07-27 DIAGNOSIS — E11.9 TYPE 2 DIABETES MELLITUS WITHOUT COMPLICATION, WITH LONG-TERM CURRENT USE OF INSULIN (MULTI): ICD-10-CM

## 2023-07-27 DIAGNOSIS — Z12.5 SCREENING PSA (PROSTATE SPECIFIC ANTIGEN): ICD-10-CM

## 2023-07-27 DIAGNOSIS — Z79.4 TYPE 2 DIABETES MELLITUS WITHOUT COMPLICATION, WITH LONG-TERM CURRENT USE OF INSULIN (MULTI): ICD-10-CM

## 2023-07-27 DIAGNOSIS — S43.102D SEPARATION OF LEFT ACROMIOCLAVICULAR JOINT, SUBSEQUENT ENCOUNTER: ICD-10-CM

## 2023-07-27 DIAGNOSIS — D50.9 IRON DEFICIENCY ANEMIA, UNSPECIFIED IRON DEFICIENCY ANEMIA TYPE: ICD-10-CM

## 2023-07-27 DIAGNOSIS — E78.49 OTHER HYPERLIPIDEMIA: ICD-10-CM

## 2023-07-27 DIAGNOSIS — I25.119 CORONARY ARTERY DISEASE INVOLVING NATIVE CORONARY ARTERY OF NATIVE HEART WITH ANGINA PECTORIS (CMS-HCC): Primary | ICD-10-CM

## 2023-07-27 DIAGNOSIS — E66.01 MORBID (SEVERE) OBESITY DUE TO EXCESS CALORIES (MULTI): ICD-10-CM

## 2023-07-27 DIAGNOSIS — S49.92XD INJURY OF LEFT SHOULDER, SUBSEQUENT ENCOUNTER: ICD-10-CM

## 2023-07-27 PROCEDURE — 3075F SYST BP GE 130 - 139MM HG: CPT | Performed by: PHYSICIAN ASSISTANT

## 2023-07-27 PROCEDURE — 1036F TOBACCO NON-USER: CPT | Performed by: PHYSICIAN ASSISTANT

## 2023-07-27 PROCEDURE — 1126F AMNT PAIN NOTED NONE PRSNT: CPT | Performed by: PHYSICIAN ASSISTANT

## 2023-07-27 PROCEDURE — 3044F HG A1C LEVEL LT 7.0%: CPT | Performed by: PHYSICIAN ASSISTANT

## 2023-07-27 PROCEDURE — 4010F ACE/ARB THERAPY RXD/TAKEN: CPT | Performed by: PHYSICIAN ASSISTANT

## 2023-07-27 PROCEDURE — 99213 OFFICE O/P EST LOW 20 MIN: CPT | Performed by: PHYSICIAN ASSISTANT

## 2023-07-27 PROCEDURE — 1159F MED LIST DOCD IN RCRD: CPT | Performed by: PHYSICIAN ASSISTANT

## 2023-07-27 PROCEDURE — 3008F BODY MASS INDEX DOCD: CPT | Performed by: PHYSICIAN ASSISTANT

## 2023-07-27 PROCEDURE — 3078F DIAST BP <80 MM HG: CPT | Performed by: PHYSICIAN ASSISTANT

## 2023-07-27 PROCEDURE — 1160F RVW MEDS BY RX/DR IN RCRD: CPT | Performed by: PHYSICIAN ASSISTANT

## 2023-07-27 RX ORDER — LOSARTAN POTASSIUM 100 MG/1
100 TABLET ORAL DAILY
COMMUNITY
End: 2023-08-14 | Stop reason: ALTCHOICE

## 2023-07-27 RX ORDER — MULTIVITAMIN
1 TABLET ORAL DAILY
COMMUNITY

## 2023-07-27 ASSESSMENT — PATIENT HEALTH QUESTIONNAIRE - PHQ9
10. IF YOU CHECKED OFF ANY PROBLEMS, HOW DIFFICULT HAVE THESE PROBLEMS MADE IT FOR YOU TO DO YOUR WORK, TAKE CARE OF THINGS AT HOME, OR GET ALONG WITH OTHER PEOPLE: NOT DIFFICULT AT ALL
SUM OF ALL RESPONSES TO PHQ9 QUESTIONS 1 AND 2: 2
2. FEELING DOWN, DEPRESSED OR HOPELESS: SEVERAL DAYS
1. LITTLE INTEREST OR PLEASURE IN DOING THINGS: SEVERAL DAYS

## 2023-07-27 ASSESSMENT — PAIN SCALES - GENERAL: PAINLEVEL: 0-NO PAIN

## 2023-08-02 ENCOUNTER — LAB (OUTPATIENT)
Dept: LAB | Facility: LAB | Age: 73
End: 2023-08-02
Payer: MEDICARE

## 2023-08-02 DIAGNOSIS — R79.89 ELEVATED SERUM CREATININE: ICD-10-CM

## 2023-08-02 DIAGNOSIS — N18.9 CHRONIC KIDNEY DISEASE, UNSPECIFIED CKD STAGE: ICD-10-CM

## 2023-08-02 PROCEDURE — 84100 ASSAY OF PHOSPHORUS: CPT

## 2023-08-02 PROCEDURE — 80053 COMPREHEN METABOLIC PANEL: CPT

## 2023-08-02 PROCEDURE — 36415 COLL VENOUS BLD VENIPUNCTURE: CPT

## 2023-08-03 LAB
ALANINE AMINOTRANSFERASE (SGPT) (U/L) IN SER/PLAS: 25 U/L (ref 10–52)
ALBUMIN (G/DL) IN SER/PLAS: 4.9 G/DL (ref 3.4–5)
ALKALINE PHOSPHATASE (U/L) IN SER/PLAS: 63 U/L (ref 33–136)
ANION GAP IN SER/PLAS: 15 MMOL/L (ref 10–20)
ASPARTATE AMINOTRANSFERASE (SGOT) (U/L) IN SER/PLAS: 20 U/L (ref 9–39)
BILIRUBIN TOTAL (MG/DL) IN SER/PLAS: 0.4 MG/DL (ref 0–1.2)
CALCIUM (MG/DL) IN SER/PLAS: 10.7 MG/DL (ref 8.6–10.3)
CARBON DIOXIDE, TOTAL (MMOL/L) IN SER/PLAS: 17 MMOL/L (ref 21–32)
CHLORIDE (MMOL/L) IN SER/PLAS: 109 MMOL/L (ref 98–107)
CREATININE (MG/DL) IN SER/PLAS: 2.14 MG/DL (ref 0.5–1.3)
GFR MALE: 32 ML/MIN/1.73M2
GLUCOSE (MG/DL) IN SER/PLAS: 190 MG/DL (ref 74–99)
PHOSPHATE (MG/DL) IN SER/PLAS: 4.9 MG/DL (ref 2.5–4.9)
POTASSIUM (MMOL/L) IN SER/PLAS: 6.3 MMOL/L (ref 3.5–5.3)
PROTEIN TOTAL: 7.5 G/DL (ref 6.4–8.2)
SODIUM (MMOL/L) IN SER/PLAS: 135 MMOL/L (ref 136–145)
UREA NITROGEN (MG/DL) IN SER/PLAS: 72 MG/DL (ref 6–23)

## 2023-08-06 PROBLEM — E87.5 HYPERKALEMIA: Status: ACTIVE | Noted: 2023-08-06

## 2023-08-06 PROBLEM — N17.9 AKI (ACUTE KIDNEY INJURY) (CMS-HCC): Status: ACTIVE | Noted: 2023-08-06

## 2023-08-06 NOTE — PROGRESS NOTES
Subjective   Patient ID:   Patel Goncalves is a 73 y.o. male who presents for Hospital Follow-up.  [facility]  Hospital follow up:  Admitted from 8/3-8/4.  Discharge note reviewed.  Was diagnosed with hyperkalemia/SULMA.  Was told to not take his Losartan.  May need to stop Metformin as well.  Needs CMP repeated.  Creatinine was 1.42 on discharge.  Feeling much better!    Elevated creatinine:  Creatinine was high at 1.71 on 7/18.  Referred to nephrology - cannot get in until November.    Diabetes:  Taking Metformin, Lantus 35 units at night, Humalog.  Last A1c was 6.8 in July 2023.    CAD/HTN/HLD:  Seeing cardiology.  Taking Atorvastatin, Plavix, Lasix, Metoprolol.  BP today is 135/74.  Last lipid was July 2023.    Iron deficiency anemia:  Taking a supplement.  Last checked July 2023.    Left shoulder pain:  Seeing ortho.    Health maintenance:  Smoking: Never a smoker.  PSA (50+): DUE - declines  Labs: July 2023.  Colonoscopy (50-75): April 2023.  Prevnar 13 (65+):  Pneumovax 23 (65+, 1 year after Prev 13):  Influenza:  Zostavax (60+, 1 time, at pharmacy):    Review of Systems  12 point review of systems negative unless stated above in HPI    Vitals:    08/14/23 0947   BP: 135/74   Pulse: 53   Resp: 18   SpO2: 94%     Physical Exam  General: Alert and oriented, well nourished, no acute distress.  Lungs: Clear to auscultation, non-labored respiration.  Heart: Normal rate, regular rhythm, no murmur, gallop or edema.  Neurologic: Awake, alert, and oriented X3, CN II-XII intact.  Psychiatric: Cooperative, appropriate mood and affect.    Assessment/Plan   I am glad you are feeling better!  I have ordered some labs to be done as soon as you can.  We will call you with the results.  Let's also stop the Metformin for the kidney function.  Please let me know if your sugars start to get any worse.  Continue the same medications.  Chronic conditions are stable.  Call with questions or concerns.    F/u  2-3 months    Diagnoses and  all orders for this visit:  SULMA (acute kidney injury) (CMS/[facility])  -     Comprehensive Metabolic Panel; Future  Hyperkalemia  Morbid (severe) obesity due to excess calories (CMS/[facility])  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 pectoris (CMS/[facility])  Type 2 diabetes mellitus without complication, with long-term current use of insulin (CMS/[facility])  Essential hypertension  Iron deficiency anemia, unspecified iron deficiency anemia type

## 2023-08-07 ENCOUNTER — PATIENT OUTREACH (OUTPATIENT)
Dept: PRIMARY CARE | Facility: CLINIC | Age: 73
End: 2023-08-07
Payer: MEDICARE

## 2023-08-07 NOTE — PROGRESS NOTES
Discharge Facility: [facility]  Discharge Diagnosis: Hyperkalemia, SULMA  Admission Date: 3 Aug 23  Discharge Date: 4 Aug 23    PCP Appointment Date: 14 Aug 23  Specialist Appointment Date: N/A  Hospital Encounter and Summary: Linked    No contact on discharge outreach attempt. PCP follow up set by another for 14 Aug 23. Will attempt contact again after PCP appt

## 2023-08-14 ENCOUNTER — LAB (OUTPATIENT)
Dept: LAB | Facility: LAB | Age: 73
End: 2023-08-14
Payer: MEDICARE

## 2023-08-14 ENCOUNTER — OFFICE VISIT (OUTPATIENT)
Dept: PRIMARY CARE | Facility: CLINIC | Age: 73
End: 2023-08-14
Payer: MEDICARE

## 2023-08-14 VITALS
OXYGEN SATURATION: 94 % | HEIGHT: 66 IN | WEIGHT: 192.2 LBS | SYSTOLIC BLOOD PRESSURE: 135 MMHG | HEART RATE: 53 BPM | RESPIRATION RATE: 18 BRPM | DIASTOLIC BLOOD PRESSURE: 74 MMHG | BODY MASS INDEX: 30.89 KG/M2

## 2023-08-14 DIAGNOSIS — I25.119 CORONARY ARTERY DISEASE INVOLVING NATIVE CORONARY ARTERY OF NATIVE HEART WITH ANGINA PECTORIS (CMS-HCC): ICD-10-CM

## 2023-08-14 DIAGNOSIS — E66.01 MORBID (SEVERE) OBESITY DUE TO EXCESS CALORIES (MULTI): ICD-10-CM

## 2023-08-14 DIAGNOSIS — E87.5 HYPERKALEMIA: ICD-10-CM

## 2023-08-14 DIAGNOSIS — N17.9 AKI (ACUTE KIDNEY INJURY) (CMS-HCC): ICD-10-CM

## 2023-08-14 DIAGNOSIS — D50.9 IRON DEFICIENCY ANEMIA, UNSPECIFIED IRON DEFICIENCY ANEMIA TYPE: ICD-10-CM

## 2023-08-14 DIAGNOSIS — N17.9 AKI (ACUTE KIDNEY INJURY) (CMS-HCC): Primary | ICD-10-CM

## 2023-08-14 DIAGNOSIS — E11.9 TYPE 2 DIABETES MELLITUS WITHOUT COMPLICATION, WITH LONG-TERM CURRENT USE OF INSULIN (MULTI): ICD-10-CM

## 2023-08-14 DIAGNOSIS — Z79.4 TYPE 2 DIABETES MELLITUS WITHOUT COMPLICATION, WITH LONG-TERM CURRENT USE OF INSULIN (MULTI): ICD-10-CM

## 2023-08-14 DIAGNOSIS — I10 ESSENTIAL HYPERTENSION: ICD-10-CM

## 2023-08-14 LAB
ALANINE AMINOTRANSFERASE (SGPT) (U/L) IN SER/PLAS: 32 U/L (ref 10–52)
ALBUMIN (G/DL) IN SER/PLAS: 4.4 G/DL (ref 3.4–5)
ALKALINE PHOSPHATASE (U/L) IN SER/PLAS: 58 U/L (ref 33–136)
ANION GAP IN SER/PLAS: 13 MMOL/L (ref 10–20)
ASPARTATE AMINOTRANSFERASE (SGOT) (U/L) IN SER/PLAS: 24 U/L (ref 9–39)
BILIRUBIN TOTAL (MG/DL) IN SER/PLAS: 0.4 MG/DL (ref 0–1.2)
CALCIUM (MG/DL) IN SER/PLAS: 9.7 MG/DL (ref 8.6–10.3)
CARBON DIOXIDE, TOTAL (MMOL/L) IN SER/PLAS: 25 MMOL/L (ref 21–32)
CHLORIDE (MMOL/L) IN SER/PLAS: 105 MMOL/L (ref 98–107)
CREATININE (MG/DL) IN SER/PLAS: 1.74 MG/DL (ref 0.5–1.3)
GFR MALE: 41 ML/MIN/1.73M2
GLUCOSE (MG/DL) IN SER/PLAS: 135 MG/DL (ref 74–99)
POTASSIUM (MMOL/L) IN SER/PLAS: 4.6 MMOL/L (ref 3.5–5.3)
PROTEIN TOTAL: 6.8 G/DL (ref 6.4–8.2)
SODIUM (MMOL/L) IN SER/PLAS: 138 MMOL/L (ref 136–145)
UREA NITROGEN (MG/DL) IN SER/PLAS: 33 MG/DL (ref 6–23)

## 2023-08-14 PROCEDURE — 36415 COLL VENOUS BLD VENIPUNCTURE: CPT

## 2023-08-14 PROCEDURE — 3078F DIAST BP <80 MM HG: CPT | Performed by: PHYSICIAN ASSISTANT

## 2023-08-14 PROCEDURE — 3044F HG A1C LEVEL LT 7.0%: CPT | Performed by: PHYSICIAN ASSISTANT

## 2023-08-14 PROCEDURE — 1160F RVW MEDS BY RX/DR IN RCRD: CPT | Performed by: PHYSICIAN ASSISTANT

## 2023-08-14 PROCEDURE — 3075F SYST BP GE 130 - 139MM HG: CPT | Performed by: PHYSICIAN ASSISTANT

## 2023-08-14 PROCEDURE — 1159F MED LIST DOCD IN RCRD: CPT | Performed by: PHYSICIAN ASSISTANT

## 2023-08-14 PROCEDURE — 80053 COMPREHEN METABOLIC PANEL: CPT

## 2023-08-14 PROCEDURE — 3008F BODY MASS INDEX DOCD: CPT | Performed by: PHYSICIAN ASSISTANT

## 2023-08-14 PROCEDURE — 99214 OFFICE O/P EST MOD 30 MIN: CPT | Performed by: PHYSICIAN ASSISTANT

## 2023-08-14 PROCEDURE — 1126F AMNT PAIN NOTED NONE PRSNT: CPT | Performed by: PHYSICIAN ASSISTANT

## 2023-08-14 PROCEDURE — 1036F TOBACCO NON-USER: CPT | Performed by: PHYSICIAN ASSISTANT

## 2023-08-14 ASSESSMENT — PAIN SCALES - GENERAL: PAINLEVEL: 0-NO PAIN

## 2023-08-15 ENCOUNTER — PATIENT OUTREACH (OUTPATIENT)
Dept: PRIMARY CARE | Facility: CLINIC | Age: 73
End: 2023-08-15
Payer: MEDICARE

## 2023-08-15 NOTE — RESULT ENCOUNTER NOTE
Potassium is back to normal. Creatinine is up to 1.74 - this appears to be his baseline. Follow up with nephrology as scheduled

## 2023-08-15 NOTE — PROGRESS NOTES
Call regarding appt. with PCP on (14 Aug 23) after hospitalization.  At time of outreach call the patient feels as if their condition has impoved since last visit.  Reviewed the PCP appointment with the pt and addressed any questions or concerns.   [facility] messaged at patient request to help go through patient's current medications so he knows which ones he is suppose to stop.

## 2023-08-16 DIAGNOSIS — E11.69 TYPE 2 DIABETES MELLITUS WITH OTHER SPECIFIED COMPLICATION, WITH LONG-TERM CURRENT USE OF INSULIN (MULTI): ICD-10-CM

## 2023-08-16 DIAGNOSIS — Z79.4 TYPE 2 DIABETES MELLITUS WITH OTHER SPECIFIED COMPLICATION, WITH LONG-TERM CURRENT USE OF INSULIN (MULTI): ICD-10-CM

## 2023-08-22 ENCOUNTER — PATIENT OUTREACH (OUTPATIENT)
Dept: PHARMACY | Facility: HOSPITAL | Age: 73
End: 2023-08-22
Payer: MEDICARE

## 2023-08-22 NOTE — PROGRESS NOTES
Transitional Care Management: Pharmacy    Subjective   Patel Goncalves is a 73 y.o. male who was referred to Clinical Pharmacy Team to complete TCM medication reconciliation prior to office visit with Matt Iverson PA-C. A comprehensive medication review was completed with the patient. patient had all medications and/or an updated medication list in front of them during the telephone encounter.     Admission Date:8/3/2023  Discharge Date:8/4/2023    Notable medication changes following discharge:  STOP: Losartan, Spironolactone, and Furosemide    Allergies   Allergen Reactions    Ibuprofen Itching    Naproxen Sodium Itching       [facility] [address] [place], [place] [id number] [facility]  [address] [facility] [address]  Phone: [phone] Fax: [phone]       No issues reported in regards to accessibility affordability adherence adverse effects organization.    HPI    Objective     LAB  There were no vitals taken for this visit.     Lab Results   Component Value Date    BILITOT 0.4 08/14/2023    CALCIUM 9.7 08/14/2023    CO2 25 08/14/2023     08/14/2023    CREATININE 1.74 (H) 08/14/2023    GLUCOSE 135 (H) 08/14/2023    ALKPHOS 58 08/14/2023    K 4.6 08/14/2023    PROT 6.8 08/14/2023     08/14/2023    AST 24 08/14/2023    ALT 32 08/14/2023    BUN 33 (H) 08/14/2023    ANIONGAP 13 08/14/2023    MG 1.82 08/04/2023    PHOS 4.2 08/04/2023    ALBUMIN 4.4 08/14/2023     Lab Results   Component Value Date    TRIG 155 (H) 07/18/2023    CHOL 86 07/18/2023    HDL 36.2 (A) 07/18/2023     Lab Results   Component Value Date    HGBA1C 6.9 (A) 08/03/2023     DRUG INTERACTIONS  -No significant Drug Interactions    Comments/Recommendations to PCP:  Patient states they are stopping Lisinopril and replacing with Atorvastatin per PCP due to smokers cough. These are different medication for different indications. Looks like patient was discontinued on lisinopril in March. Unsure what patient means about  stopping lisinopril and changing to atorvastatin. Patient's metformin was discontinued due to renal function. All meds adjusted per renal.     Mavis Reaves  PharmD Candidate, 2024    Marisela Melgoza PharmD, [facility]    Verbal consent to manage patient's drug therapy was obtained from the patient and/or an individual authorized to act on behalf of a patient. They were informed they may decline to participate or withdraw from participation in pharmacy services at any time.

## 2023-09-01 DIAGNOSIS — Z79.4 TYPE 2 DIABETES MELLITUS WITH OTHER SPECIFIED COMPLICATION, WITH LONG-TERM CURRENT USE OF INSULIN (MULTI): ICD-10-CM

## 2023-09-01 DIAGNOSIS — E11.69 TYPE 2 DIABETES MELLITUS WITH OTHER SPECIFIED COMPLICATION, WITH LONG-TERM CURRENT USE OF INSULIN (MULTI): ICD-10-CM

## 2023-09-01 RX ORDER — INSULIN GLARGINE 100 [IU]/ML
INJECTION, SOLUTION SUBCUTANEOUS
Qty: 15 ML | Refills: 0 | OUTPATIENT
Start: 2023-09-01

## 2023-09-01 RX ORDER — INSULIN GLARGINE 100 [IU]/ML
INJECTION, SOLUTION SUBCUTANEOUS
Qty: 15 ML | Refills: 2 | Status: SHIPPED | OUTPATIENT
Start: 2023-09-01 | End: 2023-11-28 | Stop reason: SDUPTHER

## 2023-09-01 RX ORDER — PEN NEEDLE, DIABETIC 30 GX3/16"
NEEDLE, DISPOSABLE MISCELLANEOUS
Qty: 400 EACH | Refills: 2 | Status: SHIPPED | OUTPATIENT
Start: 2023-09-01

## 2023-09-06 ENCOUNTER — PATIENT OUTREACH (OUTPATIENT)
Dept: PRIMARY CARE | Facility: CLINIC | Age: 73
End: 2023-09-06
Payer: MEDICARE

## 2023-09-06 NOTE — PROGRESS NOTES
Call placed regarding one month post discharge follow up call.  At time of outreach call the patient feels as if their condition has improved since initial visit with PCP or specialist.  Questions or concerns regarding recovery period addressed at this time.   Reviewed any PCP or specialists progress notes/labs/radiology reports if applicable and addressed any questions or concerns.   Pt stating that he is experiencing a rise in his blood pressure but it is something that he is working with his PCP to get back in the right direction.

## 2023-10-05 ENCOUNTER — APPOINTMENT (OUTPATIENT)
Dept: PRIMARY CARE | Facility: CLINIC | Age: 73
End: 2023-10-05
Payer: MEDICARE

## 2023-10-25 DIAGNOSIS — Z00.00 HEALTH CARE MAINTENANCE: ICD-10-CM

## 2023-10-25 RX ORDER — FUROSEMIDE 40 MG/1
40 TABLET ORAL DAILY
Qty: 90 TABLET | Refills: 0 | Status: SHIPPED | OUTPATIENT
Start: 2023-10-25 | End: 2024-03-18 | Stop reason: SDUPTHER

## 2023-10-26 ENCOUNTER — OFFICE VISIT (OUTPATIENT)
Dept: PRIMARY CARE | Facility: CLINIC | Age: 73
End: 2023-10-26
Payer: MEDICARE

## 2023-10-26 VITALS
DIASTOLIC BLOOD PRESSURE: 94 MMHG | HEIGHT: 66 IN | RESPIRATION RATE: 16 BRPM | WEIGHT: 200 LBS | OXYGEN SATURATION: 95 % | SYSTOLIC BLOOD PRESSURE: 180 MMHG | HEART RATE: 60 BPM | BODY MASS INDEX: 32.14 KG/M2

## 2023-10-26 DIAGNOSIS — Z79.4 TYPE 2 DIABETES MELLITUS WITHOUT COMPLICATION, WITH LONG-TERM CURRENT USE OF INSULIN (MULTI): ICD-10-CM

## 2023-10-26 DIAGNOSIS — I10 ESSENTIAL HYPERTENSION: ICD-10-CM
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
## 2023-10-26 DIAGNOSIS — D50.9 IRON DEFICIENCY ANEMIA, UNSPECIFIED IRON DEFICIENCY ANEMIA TYPE: ICD-10-CM

## 2023-10-26 DIAGNOSIS — I25.810 CORONARY ARTERY DISEASE INVOLVING CORONARY BYPASS GRAFT OF NATIVE HEART, UNSPECIFIED WHETHER ANGINA PRESENT: ICD-10-CM

## 2023-10-26 DIAGNOSIS — E11.9 TYPE 2 DIABETES MELLITUS WITHOUT COMPLICATION, WITH LONG-TERM CURRENT USE OF INSULIN (MULTI): ICD-10-CM

## 2023-10-26 DIAGNOSIS — M65.30 TRIGGER FINGER, UNSPECIFIED FINGER, UNSPECIFIED LATERALITY: ICD-10-CM

## 2023-10-26 DIAGNOSIS — E78.49 OTHER HYPERLIPIDEMIA: ICD-10-CM

## 2023-10-26 DIAGNOSIS — N18.9 CHRONIC KIDNEY DISEASE, UNSPECIFIED CKD STAGE: ICD-10-CM

## 2023-10-26 DIAGNOSIS — I50.43 ACUTE ON CHRONIC COMBINED SYSTOLIC AND DIASTOLIC HEART FAILURE (MULTI): ICD-10-CM

## 2023-10-26 DIAGNOSIS — Z95.1 S/P CABG (CORONARY ARTERY BYPASS GRAFT): ICD-10-CM

## 2023-10-26 DIAGNOSIS — S10.96XA TICK BITE OF NECK, INITIAL ENCOUNTER: ICD-10-CM

## 2023-10-26 PROCEDURE — 3080F DIAST BP >= 90 MM HG: CPT | Performed by: INTERNAL MEDICINE

## 2023-10-26 PROCEDURE — 1126F AMNT PAIN NOTED NONE PRSNT: CPT | Performed by: INTERNAL MEDICINE

## 2023-10-26 PROCEDURE — 99214 OFFICE O/P EST MOD 30 MIN: CPT | Performed by: INTERNAL MEDICINE

## 2023-10-26 PROCEDURE — 1036F TOBACCO NON-USER: CPT | Performed by: INTERNAL MEDICINE

## 2023-10-26 PROCEDURE — 1160F RVW MEDS BY RX/DR IN RCRD: CPT | Performed by: INTERNAL MEDICINE

## 2023-10-26 PROCEDURE — 3077F SYST BP >= 140 MM HG: CPT | Performed by: INTERNAL MEDICINE

## 2023-10-26 PROCEDURE — 1159F MED LIST DOCD IN RCRD: CPT | Performed by: INTERNAL MEDICINE

## 2023-10-26 PROCEDURE — 3044F HG A1C LEVEL LT 7.0%: CPT | Performed by: INTERNAL MEDICINE

## 2023-10-26 RX ORDER — ATORVASTATIN CALCIUM 80 MG/1
80 TABLET, FILM COATED ORAL NIGHTLY
Qty: 90 TABLET | Refills: 1 | Status: SHIPPED | OUTPATIENT
Start: 2023-10-26 | End: 2024-04-15

## 2023-10-26 RX ORDER — DOXYCYCLINE 100 MG/1
100 CAPSULE ORAL 2 TIMES DAILY
Qty: 14 CAPSULE | Refills: 0 | Status: SHIPPED | OUTPATIENT
Start: 2023-10-26 | End: 2023-11-02

## 2023-10-26 RX ORDER — LISINOPRIL 40 MG/1
40 TABLET ORAL DAILY
COMMUNITY
Start: 2023-08-21 | End: 2023-10-26 | Stop reason: ENTERED-IN-ERROR

## 2023-10-26 ASSESSMENT — ENCOUNTER SYMPTOMS
CARDIOVASCULAR NEGATIVE: 1
GASTROINTESTINAL NEGATIVE: 1
RESPIRATORY NEGATIVE: 1

## 2023-10-26 ASSESSMENT — PATIENT HEALTH QUESTIONNAIRE - PHQ9
1. LITTLE INTEREST OR PLEASURE IN DOING THINGS: NOT AT ALL
SUM OF ALL RESPONSES TO PHQ9 QUESTIONS 1 AND 2: 0
2. FEELING DOWN, DEPRESSED OR HOPELESS: NOT AT ALL

## 2023-10-26 ASSESSMENT — PAIN SCALES - GENERAL: PAINLEVEL: 0-NO PAIN

## 2023-10-26 NOTE — PROGRESS NOTES
Patient ID: He states that his BP has been more elevated, has been higher than it was. He states he feels he is back to his baseline since his CABG last year. Denies Chest pain or SOB. He states he is back to his previous activity level. He denies any depression. He states he sleeps ok. He states he has had a couple of tick bites, one on left side of his neck is mostly healed. One on his right flank area as well.     Patel Goncalves is a 73 y.o. male with PMH remarkable for CAD s/p CABG, HTN, CKD with recent hospitalization in August for SULMA/Hyperkalemia who presents to the office today for Follow-up, Coronary Artery Disease, and Hypertension. He was previously seen in office per MARIXA Wright, to establish care.     HEALTH MAINTENANCE: Follow Up  Smoking: never smoker  Labs: 8/3/23, lipid panel on 7/18/23  PSA: could not locate  Colonoscopy (45-75): ordered in April  Lung cancer screening (55-80 + 30 pack year + smoking/quit in last 15 years): n/a  DEXA (65+, q 2 years): n/a  2D echo from 3/3/23 revealed: 1. Left ventricular systolic function is mildly to moderately decreased with a 40-45% estimated ejection fraction 2. Abnormal septal motion consistent with post-operative status 3. Spectral Doppler shows an abnormal pattern of left ventricular diastolic filling 4. There is low normal right ventricular systolic function 5. Mild aortic valve stenosis 6. There is mild mitral and tricuspid regurgitation 7. The estimated pulmonary artery pressure is mildly elevated with the RVSP at 36.4 mmHg.    SOCIAL HISTORY:  Social History     Tobacco Use    Smoking status: Never    Smokeless tobacco: Never   Vaping Use    Vaping Use: Never used   Substance Use Topics    Alcohol use: Not Currently     Comment: occ    Drug use: Not Currently     IMMUNIZATIONS:    There is no immunization history on file for this patient.    REVIEW OF SYSTEMS:  Review of Systems   Respiratory: Negative.     Cardiovascular: Negative.     Gastrointestinal: Negative.    Genitourinary: Negative.    Skin:         Multiple tick bites     ALLERGIES:  Allergies   Allergen Reactions    Ibuprofen Itching    Naproxen Sodium Itching      VITAL SIGNS:  Vitals:    10/26/23 1048   BP: (!) 180/94   Pulse: 60   Resp: 16   SpO2: 95%     PHYSICAL EXAM:  Physical Exam  Vitals reviewed.   Constitutional:       Appearance: Normal appearance.   HENT:      Head: Normocephalic and atraumatic.   Cardiovascular:      Rate and Rhythm: Normal rate and regular rhythm.      Pulses: Normal pulses.      Heart sounds: Normal heart sounds.   Pulmonary:      Effort: Pulmonary effort is normal.      Breath sounds: Normal breath sounds.   Abdominal:      General: Bowel sounds are normal.      Palpations: Abdomen is soft.   Musculoskeletal:         General: Normal range of motion.   Skin:     Comments: There was a scabbed area to his neck and right flank from tick bite which was opened up with small needle and material removed as there was concern about head of tick remaining in bite. He has several small scabbed areas over left side of body, from tick bites where tick was completely removed   Neurological:      General: No focal deficit present.      Mental Status: He is alert and oriented to person, place, and time.   Psychiatric:         Mood and Affect: Mood normal.         Behavior: Behavior normal.       MEDICATIONS:  Current Outpatient Medications on File Prior to Visit   Medication Sig Dispense Refill    aspirin 81 mg chewable tablet Chew.      atorvastatin (Lipitor) 80 mg tablet Take 1 tablet (80 mg) by mouth once daily at bedtime.      clopidogrel (Plavix) 75 mg tablet Take 1 tablet (75 mg) by mouth once daily.      Ferrex 150 150 mg iron capsule Take 1 capsule (150 mg) by mouth once daily.      FreeStyle Evy 2 Sensor kit Inject 1 each under the skin if needed (glucose check). 1 each 0    FreeStyle Evy reader (FreeStyle Evy 2 [employer]) misc Use as instructed 1 each 0     "furosemide (Lasix) 40 mg tablet Take 1 tablet (40 mg) by mouth once daily. 90 tablet 0    insulin glargine (Lantus Solostar U-100 Insulin) 100 unit/mL (3 mL) pen INJECT 35 UNITS SUBCUTANEOUSLY AT BEDTIME 15 mL 2    insulin lispro (HumaLOG) 100 unit/mL injection INJECT 8 UNITS SUBCUTANEOUSLY 3 TIMES DAILY PLUS SLIDING SCALE. 151-200, 2 UNITS. 201-250, 4 UNITS. 251-300, 6 UNITS. 301-350, 8 UNITS. 351-400, 10 UNITS. MAX MEAL DOSE 18 UNITS. MAX DAILY 54 UNITS      lisinopril 40 mg tablet Take 1 tablet (40 mg) by mouth once daily. for blood pressure      metoprolol succinate XL (Toprol-XL) 200 mg 24 hr tablet Take 1 tablet (200 mg) by mouth once daily.      multivitamin tablet Take 1 tablet by mouth once daily.      pen needle, diabetic 31 gauge x 5/16\" needle Use with insulin for DM II 4 times daily 400 each 2    [DISCONTINUED] furosemide (Lasix) 40 mg tablet Take 1 tablet (40 mg) by mouth once daily.       No current facility-administered medications on file prior to visit.      LABORATORY DATA:  Lab Results   Component Value Date    WBC 5.7 08/04/2023    HGB 11.5 (L) 08/04/2023    HCT 35.9 (L) 08/04/2023     08/04/2023    CHOL 86 07/18/2023    TRIG 155 (H) 07/18/2023    HDL 36.2 (A) 07/18/2023    ALT 32 08/14/2023    AST 24 08/14/2023     08/14/2023    K 4.6 08/14/2023     08/14/2023    CREATININE 1.74 (H) 08/14/2023    BUN 33 (H) 08/14/2023    CO2 25 08/14/2023    TSH 1.81 10/20/2022    INR 1.2 (H) 10/18/2022    HGBA1C 6.9 (A) 08/03/2023     ASSESSMENT AND PLAN:  Assessment/Plan   Diagnoses and all orders for this visit:  HTN/CHF/ischemic cardiomyopathy/Coronary artery disease involving coronary bypass graft of native heart, unspecified whether angina present, S/P CABG (coronary artery bypass graft)        -     he was hospitalized in August with hyperkalemia, SULMA, Losartan, Lasix and Spironolactone were stopped        -     his BP is elevated today, 180/94, HR 60        -     reviewed his " medication list, he continues to take Metoprolol 200mg daily        -     continue with ASA, Plavix, lipid lowering agent        -     denies any SOB or chest pain  -     atorvastatin (Lipitor) 80 mg tablet; Take 1 tablet (80 mg) by mouth once daily at bedtime  -      follow up with cardiology    Other hyperlipidemia  - reviewed lipid panel from July, WNL other than low HDL  - continue with atorvastatin     Type 2 diabetes mellitus without complication, with long-term current use of insulin (CMS/[facility])  - hgA1c on 08/09/23 was 6.9  - continue with Lantus and Humalog insulin  - continue blood sugar monitoring    Chronic kidney disease, unspecified CKD stage  - reviewed recent CMP results, BUN/Creatnine have been elevated  - he reports he has appointment with nephrology next month    Iron deficiency anemia, unspecified iron deficiency anemia type  - continue with iron supplement  - reviewed recent CBC results, H+H have been stable    Tick bite of neck, initial encounter  -     doxycycline (Vibramycin) 100 mg capsule; Take 1 capsule (100 mg) by mouth 2 times a day for 7 days. Take with at least 8 ounces (large glass) of water, do not lie down for 30 minutes after    Trigger finger, unspecified finger, unspecified laterality  -     Referral to Orthopaedic Surgery; Future    --------------------  Written by Maxine De Leon LPN, acting as a scribe for Dr. Hidalgo. This note accurately reflects the work and decisions made by Dr. Hidalgo.     I, Dr. Hidalgo, attest all medical record entries made by the scribe were under my direction and were personally dictated by me. I have reviewed the chart and agree that the record accurately reflects my performance of the history, physical exam, and assessment and plan.

## 2023-10-28 NOTE — PATIENT INSTRUCTIONS
It was great to see you in the office today! Here is what we discussed at your visit today:  Please continue to take your current medications   Follow up with specialists  We have placed a referral for ortho for your fingers  We have sent in prescription for antibiotic due to your tick bites, take as directed  Follow up in four months

## 2023-10-30 ENCOUNTER — OFFICE VISIT (OUTPATIENT)
Dept: ORTHOPEDIC SURGERY | Facility: CLINIC | Age: 73
End: 2023-10-30
Payer: MEDICARE

## 2023-10-30 VITALS — WEIGHT: 189 LBS | BODY MASS INDEX: 30.51 KG/M2

## 2023-10-30 DIAGNOSIS — M65.30 TRIGGER FINGER, UNSPECIFIED FINGER, UNSPECIFIED LATERALITY: Primary | ICD-10-CM

## 2023-10-30 PROCEDURE — 1159F MED LIST DOCD IN RCRD: CPT

## 2023-10-30 PROCEDURE — 99213 OFFICE O/P EST LOW 20 MIN: CPT

## 2023-10-30 PROCEDURE — 1036F TOBACCO NON-USER: CPT

## 2023-10-30 PROCEDURE — 1126F AMNT PAIN NOTED NONE PRSNT: CPT

## 2023-10-30 PROCEDURE — 3044F HG A1C LEVEL LT 7.0%: CPT

## 2023-10-30 PROCEDURE — 1160F RVW MEDS BY RX/DR IN RCRD: CPT

## 2023-10-30 NOTE — PROGRESS NOTES
HPI  Patel Goncalves is a 73 y.o. male  in office today for   Chief Complaint   Patient presents with    Left Hand - Trigger Finger     Ring and middle finger trigger-pt has had previous trigger release surgery    Right Hand - Trigger Finger     Pt has had previous trigger release-Ring finger    .  he states only having pain when the fingers lock and he has to manually straighten them.  Unable to fully extend the right ring and left middle fingers    Past Medical History: Trigger release, DM    Medication  Current Outpatient Medications on File Prior to Visit   Medication Sig Dispense Refill    aspirin 81 mg chewable tablet Chew.      atorvastatin (Lipitor) 80 mg tablet Take 1 tablet (80 mg) by mouth once daily at bedtime. 90 tablet 1    clopidogrel (Plavix) 75 mg tablet Take 1 tablet (75 mg) by mouth once daily.      doxycycline (Vibramycin) 100 mg capsule Take 1 capsule (100 mg) by mouth 2 times a day for 7 days. Take with at least 8 ounces (large glass) of water, do not lie down for 30 minutes after 14 capsule 0    Ferrex 150 150 mg iron capsule Take 1 capsule (150 mg) by mouth once daily.      FreeStyle Evy 2 Sensor kit Inject 1 each under the skin if needed (glucose check). 1 each 0    FreeStyle Evy reader (FreeStyle Evy 2 [employer]) misc Use as instructed 1 each 0    furosemide (Lasix) 40 mg tablet Take 1 tablet (40 mg) by mouth once daily. 90 tablet 0    insulin glargine (Lantus Solostar U-100 Insulin) 100 unit/mL (3 mL) pen INJECT 35 UNITS SUBCUTANEOUSLY AT BEDTIME 15 mL 2    insulin lispro (HumaLOG) 100 unit/mL injection INJECT 8 UNITS SUBCUTANEOUSLY 3 TIMES DAILY PLUS SLIDING SCALE. 151-200, 2 UNITS. 201-250, 4 UNITS. 251-300, 6 UNITS. 301-350, 8 UNITS. 351-400, 10 UNITS. MAX MEAL DOSE 18 UNITS. MAX DAILY 54 UNITS      metoprolol succinate XL (Toprol-XL) 200 mg 24 hr tablet Take 1 tablet (200 mg) by mouth once daily.      multivitamin tablet Take 1 tablet by mouth once daily.      pen needle, diabetic  "31 gauge x 5/16\" needle Use with insulin for DM II 4 times daily 400 each 2    [DISCONTINUED] atorvastatin (Lipitor) 80 mg tablet Take 1 tablet (80 mg) by mouth once daily at bedtime.      [DISCONTINUED] furosemide (Lasix) 40 mg tablet Take 1 tablet (40 mg) by mouth once daily.      [DISCONTINUED] lisinopril 40 mg tablet Take 1 tablet (40 mg) by mouth once daily. for blood pressure       No current facility-administered medications on file prior to visit.       Physical Exam  Constitutional: well developed, well nourished male in no acute distress  Psychiatric: normal mood, appropriate affect  Eyes: sclera anicteric  HENT: normocephalic/atraumatic  CV: regular rate and rhythm   Respiratory: non labored breathing  Integumentary: no rash  Neurological: moves all extremities    Right Hand Exam     Tenderness   The patient is experiencing tenderness in the palmar area.    Range of Motion   Hand   PIP Ring: abnormal     Other   Erythema: absent  Scars: present  Sensation: normal      Left Hand Exam     Tenderness   The patient is experiencing tenderness in the palmar area.     Range of Motion   Hand   PIP Middle: abnormal   PIP Ring: abnormal     Other   Erythema: absent  Scars: absent  Sensation: normal          Imaging/Lab:  No x-rays taken      Assessment  Assessment: Left ring and middle finger trigger finger, right ring finger trigger finger    Plan  Plan:  History, physical exam, and imaging were reviewed with patient. Discussed with Dr Alejandra since he had prior releases of the A1 pulley.  Discussed that given the prior surgery and length of time with fingers locking, he may not get the full extension back to any of the fingers, but we can help with the locking and the pain that comes with the frequent locking.  He is most bothered by the right hand and is electing to have the right ring trigger release and will considered what he would like to do with the left.  Consent signed, risks and benefits discussed, and " patient scheduled for surgery next month.  Follow Up: As needed prior to trigger release procedure.    All questions were answered for the patient prior to end of exam and patient addressed their understanding.    Yuki Merlos PA-C  10/30/23

## 2023-10-30 NOTE — H&P (VIEW-ONLY)
HPI  Patel Goncalves is a 73 y.o. male  in office today for   Chief Complaint   Patient presents with    Left Hand - Trigger Finger     Ring and middle finger trigger-pt has had previous trigger release surgery    Right Hand - Trigger Finger     Pt has had previous trigger release-Ring finger    .  he states only having pain when the fingers lock and he has to manually straighten them.  Unable to fully extend the right ring and left middle fingers    Past Medical History: Trigger release, DM    Medication  Current Outpatient Medications on File Prior to Visit   Medication Sig Dispense Refill    aspirin 81 mg chewable tablet Chew.      atorvastatin (Lipitor) 80 mg tablet Take 1 tablet (80 mg) by mouth once daily at bedtime. 90 tablet 1    clopidogrel (Plavix) 75 mg tablet Take 1 tablet (75 mg) by mouth once daily.      doxycycline (Vibramycin) 100 mg capsule Take 1 capsule (100 mg) by mouth 2 times a day for 7 days. Take with at least 8 ounces (large glass) of water, do not lie down for 30 minutes after 14 capsule 0    Ferrex 150 150 mg iron capsule Take 1 capsule (150 mg) by mouth once daily.      FreeStyle Evy 2 Sensor kit Inject 1 each under the skin if needed (glucose check). 1 each 0    FreeStyle Evy reader (FreeStyle Evy 2 [place]) misc Use as instructed 1 each 0    furosemide (Lasix) 40 mg tablet Take 1 tablet (40 mg) by mouth once daily. 90 tablet 0    insulin glargine (Lantus Solostar U-100 Insulin) 100 unit/mL (3 mL) pen INJECT 35 UNITS SUBCUTANEOUSLY AT BEDTIME 15 mL 2    insulin lispro (HumaLOG) 100 unit/mL injection INJECT 8 UNITS SUBCUTANEOUSLY 3 TIMES DAILY PLUS SLIDING SCALE. 151-200, 2 UNITS. 201-250, 4 UNITS. 251-300, 6 UNITS. 301-350, 8 UNITS. 351-400, 10 UNITS. MAX MEAL DOSE 18 UNITS. MAX DAILY 54 UNITS      metoprolol succinate XL (Toprol-XL) 200 mg 24 hr tablet Take 1 tablet (200 mg) by mouth once daily.      multivitamin tablet Take 1 tablet by mouth once daily.      pen needle, diabetic  "31 gauge x 5/16\" needle Use with insulin for DM II 4 times daily 400 each 2    [DISCONTINUED] atorvastatin (Lipitor) 80 mg tablet Take 1 tablet (80 mg) by mouth once daily at bedtime.      [DISCONTINUED] furosemide (Lasix) 40 mg tablet Take 1 tablet (40 mg) by mouth once daily.      [DISCONTINUED] lisinopril 40 mg tablet Take 1 tablet (40 mg) by mouth once daily. for blood pressure       No current facility-administered medications on file prior to visit.       Physical Exam  Constitutional: well developed, well nourished male in no acute distress  Psychiatric: normal mood, appropriate affect  Eyes: sclera anicteric  HENT: normocephalic/atraumatic  CV: regular rate and rhythm   Respiratory: non labored breathing  Integumentary: no rash  Neurological: moves all extremities    Right Hand Exam     Tenderness   The patient is experiencing tenderness in the palmar area.    Range of Motion   Hand   PIP Ring: abnormal     Other   Erythema: absent  Scars: present  Sensation: normal      Left Hand Exam     Tenderness   The patient is experiencing tenderness in the palmar area.     Range of Motion   Hand   PIP Middle: abnormal   PIP Ring: abnormal     Other   Erythema: absent  Scars: absent  Sensation: normal          Imaging/Lab:  No x-rays taken      Assessment  Assessment: Left ring and middle finger trigger finger, right ring finger trigger finger    Plan  Plan:  History, physical exam, and imaging were reviewed with patient. Discussed with Dr Alejandra since he had prior releases of the A1 pulley.  Discussed that given the prior surgery and length of time with fingers locking, he may not get the full extension back to any of the fingers, but we can help with the locking and the pain that comes with the frequent locking.  He is most bothered by the right hand and is electing to have the right ring trigger release and will considered what he would like to do with the left.  Consent signed, risks and benefits discussed, and " patient scheduled for surgery next month.  Follow Up: As needed prior to trigger release procedure.    All questions were answered for the patient prior to end of exam and patient addressed their understanding.    Yuki Merlos PA-C  10/30/23

## 2023-11-02 ENCOUNTER — PATIENT OUTREACH (OUTPATIENT)
Dept: PRIMARY CARE | Facility: CLINIC | Age: 73
End: 2023-11-02
Payer: MEDICARE

## 2023-11-02 NOTE — PROGRESS NOTES
No contact on 90 day outreach attempt. Message left for patient. Patient has graduated from TCM program at this time.

## 2023-11-03 DIAGNOSIS — E11.69 TYPE 2 DIABETES MELLITUS WITH OTHER SPECIFIED COMPLICATION, UNSPECIFIED WHETHER LONG TERM INSULIN USE (MULTI): ICD-10-CM

## 2023-11-03 RX ORDER — FLASH GLUCOSE SENSOR
1 KIT MISCELLANEOUS AS NEEDED
Qty: 1 EACH | Refills: 2 | Status: SHIPPED | OUTPATIENT
Start: 2023-11-03 | End: 2023-11-07 | Stop reason: SDUPTHER

## 2023-11-07 DIAGNOSIS — E11.69 TYPE 2 DIABETES MELLITUS WITH OTHER SPECIFIED COMPLICATION, UNSPECIFIED WHETHER LONG TERM INSULIN USE (MULTI): ICD-10-CM

## 2023-11-07 RX ORDER — FLASH GLUCOSE SENSOR
KIT MISCELLANEOUS
Qty: 2 EACH | Refills: 0 | Status: SHIPPED | OUTPATIENT
Start: 2023-11-07 | End: 2023-12-18 | Stop reason: SDUPTHER

## 2023-11-16 RX ORDER — SODIUM CHLORIDE, SODIUM LACTATE, POTASSIUM CHLORIDE, CALCIUM CHLORIDE 600; 310; 30; 20 MG/100ML; MG/100ML; MG/100ML; MG/100ML
100 INJECTION, SOLUTION INTRAVENOUS CONTINUOUS
Status: CANCELLED | OUTPATIENT
Start: 2023-11-16

## 2023-11-16 RX ORDER — ACETAMINOPHEN 325 MG/1
650 TABLET ORAL EVERY 4 HOURS PRN
Status: CANCELLED | OUTPATIENT
Start: 2023-11-16

## 2023-11-16 RX ORDER — ALBUTEROL SULFATE 0.83 MG/ML
2.5 SOLUTION RESPIRATORY (INHALATION) ONCE AS NEEDED
Status: CANCELLED | OUTPATIENT
Start: 2023-11-16

## 2023-11-16 RX ORDER — ONDANSETRON HYDROCHLORIDE 2 MG/ML
8 INJECTION, SOLUTION INTRAVENOUS ONCE
Status: CANCELLED | OUTPATIENT
Start: 2023-11-16 | End: 2023-11-16

## 2023-11-17 ENCOUNTER — HOSPITAL ENCOUNTER (OUTPATIENT)
Facility: HOSPITAL | Age: 73
Setting detail: OUTPATIENT SURGERY
Discharge: HOME | End: 2023-11-17
Attending: ORTHOPAEDIC SURGERY | Admitting: ORTHOPAEDIC SURGERY
Payer: MEDICARE

## 2023-11-17 VITALS
TEMPERATURE: 97.3 F | SYSTOLIC BLOOD PRESSURE: 164 MMHG | DIASTOLIC BLOOD PRESSURE: 83 MMHG | BODY MASS INDEX: 30.91 KG/M2 | RESPIRATION RATE: 16 BRPM | HEART RATE: 57 BPM | OXYGEN SATURATION: 97 % | HEIGHT: 68 IN | WEIGHT: 203.93 LBS

## 2023-11-17 DIAGNOSIS — M65.341 TRIGGER RING FINGER OF RIGHT HAND: ICD-10-CM

## 2023-11-17 DIAGNOSIS — M65.30 TRIGGER FINGER, UNSPECIFIED FINGER, UNSPECIFIED LATERALITY: Primary | ICD-10-CM

## 2023-11-17 LAB — GLUCOSE BLD MANUAL STRIP-MCNC: 206 MG/DL (ref 74–99)

## 2023-11-17 PROCEDURE — 3600000003 HC OR TIME - INITIAL BASE CHARGE - PROCEDURE LEVEL THREE: Performed by: ORTHOPAEDIC SURGERY

## 2023-11-17 PROCEDURE — A4217 STERILE WATER/SALINE, 500 ML: HCPCS | Performed by: ORTHOPAEDIC SURGERY

## 2023-11-17 PROCEDURE — 2500000004 HC RX 250 GENERAL PHARMACY W/ HCPCS (ALT 636 FOR OP/ED): Performed by: ORTHOPAEDIC SURGERY

## 2023-11-17 PROCEDURE — 7100000010 HC PHASE TWO TIME - EACH INCREMENTAL 1 MINUTE: Performed by: ORTHOPAEDIC SURGERY

## 2023-11-17 PROCEDURE — 82947 ASSAY GLUCOSE BLOOD QUANT: CPT

## 2023-11-17 PROCEDURE — 88305 TISSUE EXAM BY PATHOLOGIST: CPT | Mod: TC | Performed by: ORTHOPAEDIC SURGERY

## 2023-11-17 PROCEDURE — 2500000005 HC RX 250 GENERAL PHARMACY W/O HCPCS: Performed by: ORTHOPAEDIC SURGERY

## 2023-11-17 PROCEDURE — 88305 TISSUE EXAM BY PATHOLOGIST: CPT | Performed by: PATHOLOGY

## 2023-11-17 PROCEDURE — 3600000008 HC OR TIME - EACH INCREMENTAL 1 MINUTE - PROCEDURE LEVEL THREE: Performed by: ORTHOPAEDIC SURGERY

## 2023-11-17 PROCEDURE — 26145 TENDON EXCISION PALM/FINGER: CPT | Performed by: ORTHOPAEDIC SURGERY

## 2023-11-17 PROCEDURE — 88305 TISSUE EXAM BY PATHOLOGIST: CPT | Mod: TC,SUR | Performed by: ORTHOPAEDIC SURGERY

## 2023-11-17 PROCEDURE — 7100000009 HC PHASE TWO TIME - INITIAL BASE CHARGE: Performed by: ORTHOPAEDIC SURGERY

## 2023-11-17 RX ORDER — HYDROCODONE BITARTRATE AND ACETAMINOPHEN 5; 325 MG/1; MG/1
1 TABLET ORAL EVERY 6 HOURS PRN
Qty: 12 TABLET | Refills: 0 | Status: SHIPPED | OUTPATIENT
Start: 2023-11-17 | End: 2023-11-22

## 2023-11-17 RX ORDER — LIDOCAINE HYDROCHLORIDE AND EPINEPHRINE 10; 10 MG/ML; UG/ML
INJECTION, SOLUTION INFILTRATION; PERINEURAL AS NEEDED
Status: DISCONTINUED | OUTPATIENT
Start: 2023-11-17 | End: 2023-11-17 | Stop reason: HOSPADM

## 2023-11-17 RX ORDER — SODIUM CHLORIDE 0.9 G/100ML
IRRIGANT IRRIGATION AS NEEDED
Status: DISCONTINUED | OUTPATIENT
Start: 2023-11-17 | End: 2023-11-17 | Stop reason: HOSPADM

## 2023-11-17 RX ORDER — BUPIVACAINE HCL/EPINEPHRINE 0.25-.0005
VIAL (ML) INJECTION AS NEEDED
Status: DISCONTINUED | OUTPATIENT
Start: 2023-11-17 | End: 2023-11-17 | Stop reason: HOSPADM

## 2023-11-17 ASSESSMENT — COLUMBIA-SUICIDE SEVERITY RATING SCALE - C-SSRS
2. HAVE YOU ACTUALLY HAD ANY THOUGHTS OF KILLING YOURSELF?: NO
1. IN THE PAST MONTH, HAVE YOU WISHED YOU WERE DEAD OR WISHED YOU COULD GO TO SLEEP AND NOT WAKE UP?: NO
6. HAVE YOU EVER DONE ANYTHING, STARTED TO DO ANYTHING, OR PREPARED TO DO ANYTHING TO END YOUR LIFE?: NO

## 2023-11-17 NOTE — OP NOTE
Pre-Operative Diagnosis: Right ring finger trigger  Post-Operative Diagnosis: same and likely gouty deposit of the FDP tendon  Procedure: Right ring finger trigger release and flexor tenosynovectomy/mass excision from FDP tendon  Assistant: None  Anesthesia: Local   Complications: none  Estimated blood loss: minimal  Specimen: none  Findings: See below  Disposition: Good/PACU      Indications: Patient with symptomatic trigger finger as well as PIP chronic contracture.  He has had persistent symptoms following previous trigger release many years ago.. We discussed risks and benefits of continued nonoperative versus operative treatment options and after thoroughly reviewing patient wished to proceed with operative intervention, as indicated. Expected operative and postoperative courses reviewed and all questions addressed.    Operative course: The patient was greeted in the preoperative holding area and the operative site was marked with indelible marker.  After confirming patient identifiers and surgical site a mixture of lidocaine and Marcaine with epinephrine was infiltrated about the planned incision site using sterile technique.  Patient was brought back to the operating room suite and the operative extremity was prepped and draped in standard sterile fashion and timeout procedure was performed as per standard protocol.  A Carol type incision was made directly overlying the level of the right ring finger A1 pulley extending slightly distally. Gentle spreading was carried down to the level of the A1 pulley.  Neurovascular structures were protected with gentle blunt retraction and A1 pulley was sharply released in a proximal to distal direction. Complete release distally was confirmed visually.  There was a portion of the proximal aspect of the A2 pulley that was also released as there was significant tendon thickening that was hitting up against that area of the pulley.  Gentle spreading proximally also confirmed  complete release. Patient was then able to demonstrate full composite flexion and extension with resolution of locking/catching.  Inspection of the FDP tendon demonstrated significant mass that appeared to grossly represent a gouty deposition.  The mass was sharply excised and flexor Radha synovectomy was performed while carefully protecting the FDP tendon.  After completion of this the PIP joint contracture was actively corrected to 20 degrees and passively could fully extend with some resistance at terminal extension.  Wound was irrigated and reapproximated with 4-0 Monocryl suture in a buried interrupted fashion followed by Exofin on the skin.  Dry sterile dressings were applied and patient was taken to the recovery room for further care.    Patient will follow-up in 2 weeks for wound check.

## 2023-11-17 NOTE — DISCHARGE INSTRUCTIONS
Dr. Alejandra Post-Operative Instructions  Hand/Wrist/Elbow    Activity:  Rest on the day of surgery.  Gradually resume your regular diet.    Anesthesia:  Numbing medication may last for 8-24 hours.    Post-Operative Medications:  You may resume your regular medications (including blood thinners).  You have been given a prescription for pain medication as needed.  You may also take over-the-counter anti-inflammatories and/or Tylenol for pain relief.  If you are taking the prescribed pain medication you must limit additional Tylenol (acetaminophen) intake to avoid overdose.    Dressings:  Keep your dressings clean and dry.  You may cover with a plastic bag or cast cover and seal bag to your skin above the bandage for showering.  If your dressing becomes wet or significantly bloodstained call the office at [phone].  Okay to remove outer dressings after 2-3 days and shower/wash hands.  Do not soak wound (I.e. no swimming pool, hot tub or dishes).    Post-Operative Care:  Keep the surgical site elevated above the level of your heart to limit swelling.  If your fingers are not included in the dressing you are encouraged to move your fingers regularly (full fist and full extension).  Regularly ice the surgical site.  You may also apply ice pack above the level of the dressing and this will cool the blood as it travels towards the surgical site.    Call Surgeon/Office at any time for:           Office Number: [phone]  Excessive bleeding  Loss of feeling (The local numbing medicine from surgery typically lasts 8-12 hours.  Nerve blocks can last 18-36 hours.)  Tight dressing: Make sure that you are elevating the operative site appropriately.  If no relief then call the office.                                                                                                        Circulation issues:  If fingers change to white or blue  Concerns/Problems with your surgery

## 2023-11-20 ENCOUNTER — TELEPHONE (OUTPATIENT)
Dept: ORTHOPEDIC SURGERY | Facility: CLINIC | Age: 73
End: 2023-11-20
Payer: MEDICARE

## 2023-11-20 NOTE — TELEPHONE ENCOUNTER
11/17/23 Right ring finger trigger release and flexor tenosynovectomy/mass excision from FDP tendon     Patient and Dr. Alejandra discussed him ordering a brace off of [employer] but he forgot to get the details on what type of brace. Are you able to assist with this?

## 2023-11-27 ENCOUNTER — OFFICE VISIT (OUTPATIENT)
Dept: ORTHOPEDIC SURGERY | Facility: CLINIC | Age: 73
End: 2023-11-27
Payer: MEDICARE

## 2023-11-27 DIAGNOSIS — Z98.890 STATUS POST TRIGGER FINGER RELEASE: ICD-10-CM

## 2023-11-27 DIAGNOSIS — M65.341 TRIGGER RING FINGER OF RIGHT HAND: Primary | ICD-10-CM

## 2023-11-27 LAB
LABORATORY COMMENT REPORT: NORMAL
PATH REPORT.FINAL DX SPEC: NORMAL
PATH REPORT.GROSS SPEC: NORMAL
PATH REPORT.RELEVANT HX SPEC: NORMAL
PATH REPORT.TOTAL CANCER: NORMAL

## 2023-11-27 PROCEDURE — 1126F AMNT PAIN NOTED NONE PRSNT: CPT

## 2023-11-27 PROCEDURE — 1159F MED LIST DOCD IN RCRD: CPT

## 2023-11-27 PROCEDURE — 3044F HG A1C LEVEL LT 7.0%: CPT

## 2023-11-27 PROCEDURE — 99024 POSTOP FOLLOW-UP VISIT: CPT

## 2023-11-27 PROCEDURE — 1036F TOBACCO NON-USER: CPT

## 2023-11-27 PROCEDURE — 1160F RVW MEDS BY RX/DR IN RCRD: CPT

## 2023-11-27 NOTE — PROGRESS NOTES
History of Present Illness  Chief Complaint   Patient presents with    Right Hand - Post-op     11/17/23 RT R FINGER TR-PT C/O OF SWELLING AND NOT BEING ABLE TO FULLY STRAIGHTEN THE FINGER      S/p side: right ring trigger release on 11/17/23  Patient returns today denying any significant pain.  Endorses good relief of pre-op symptoms, no locking or catching of finger since surgery.  No pain. Denies any new neuro deficits. No fever or drainage.     Review of Systems   GENERAL: Negative for malaise, significant weight loss, fever  MUSCULOSKELETAL: see HPI  NEURO:  Negative     Examination  side: right hand  Healthy incision without erythema, warmth, or drainage   Sensation intact median, ulnar and radial nerve distribution   Good cap refill  Decreased flexion and extension at the PIP of the ring finger     Assessment:  Patient status post side: right ring trigger release     Plan  Patient to continue to use right hand to tolerance.  Weight bearing as tolerated.  Wound is healing nicely. Discussed incision care and scar massage.  Discussed it taking up to 3 months to get full movement and total pain relief in the PIP of the ring finger  Encouraged ROM of digits, formal OT if any difficulties.  Follow-up: paulino Merlos PA-C  11/27/23

## 2023-11-28 DIAGNOSIS — E11.69 TYPE 2 DIABETES MELLITUS WITH OTHER SPECIFIED COMPLICATION, WITH LONG-TERM CURRENT USE OF INSULIN (MULTI): ICD-10-CM

## 2023-11-28 DIAGNOSIS — Z79.4 TYPE 2 DIABETES MELLITUS WITH OTHER SPECIFIED COMPLICATION, WITH LONG-TERM CURRENT USE OF INSULIN (MULTI): ICD-10-CM

## 2023-11-28 RX ORDER — INSULIN GLARGINE 100 [IU]/ML
INJECTION, SOLUTION SUBCUTANEOUS
Qty: 15 ML | Refills: 2 | Status: SHIPPED | OUTPATIENT
Start: 2023-11-28

## 2023-11-28 RX ORDER — INSULIN LISPRO 100 [IU]/ML
INJECTION, SOLUTION INTRAVENOUS; SUBCUTANEOUS
Qty: 4 ML | Refills: 2 | Status: SHIPPED | OUTPATIENT
Start: 2023-11-28 | End: 2024-02-12 | Stop reason: SDUPTHER

## 2023-12-14 ENCOUNTER — LAB (OUTPATIENT)
Dept: LAB | Facility: LAB | Age: 73
End: 2023-12-14
Payer: MEDICARE

## 2023-12-14 ENCOUNTER — OFFICE VISIT (OUTPATIENT)
Dept: NEPHROLOGY | Facility: CLINIC | Age: 73
End: 2023-12-14
Payer: MEDICARE

## 2023-12-14 VITALS
WEIGHT: 198.6 LBS | SYSTOLIC BLOOD PRESSURE: 140 MMHG | BODY MASS INDEX: 29.41 KG/M2 | DIASTOLIC BLOOD PRESSURE: 79 MMHG | OXYGEN SATURATION: 98 % | TEMPERATURE: 98 F | RESPIRATION RATE: 16 BRPM | HEART RATE: 63 BPM | HEIGHT: 69 IN

## 2023-12-14 DIAGNOSIS — N06.0 ISOLATED PROTEINURIA WITH MINOR GLOMERULAR ABNORMALITY: ICD-10-CM

## 2023-12-14 DIAGNOSIS — D50.8 OTHER IRON DEFICIENCY ANEMIA: ICD-10-CM

## 2023-12-14 DIAGNOSIS — M10.9 GOUT, ARTHRITIS: ICD-10-CM

## 2023-12-14 DIAGNOSIS — R79.89 ELEVATED SERUM CREATININE: ICD-10-CM

## 2023-12-14 DIAGNOSIS — N18.32 STAGE 3B CHRONIC KIDNEY DISEASE (MULTI): ICD-10-CM

## 2023-12-14 DIAGNOSIS — E11.00 TYPE 2 DIABETES MELLITUS WITH HYPEROSMOLARITY WITHOUT COMA, WITHOUT LONG-TERM CURRENT USE OF INSULIN (MULTI): ICD-10-CM

## 2023-12-14 DIAGNOSIS — N18.32 STAGE 3B CHRONIC KIDNEY DISEASE (MULTI): Primary | ICD-10-CM

## 2023-12-14 LAB
ALBUMIN SERPL BCP-MCNC: 4.3 G/DL (ref 3.4–5)
ANION GAP SERPL CALC-SCNC: 15 MMOL/L (ref 10–20)
APPEARANCE UR: CLEAR
BILIRUB UR STRIP.AUTO-MCNC: NEGATIVE MG/DL
BUN SERPL-MCNC: 18 MG/DL (ref 6–23)
CALCIUM SERPL-MCNC: 9.7 MG/DL (ref 8.6–10.3)
CHLORIDE SERPL-SCNC: 104 MMOL/L (ref 98–107)
CO2 SERPL-SCNC: 28 MMOL/L (ref 21–32)
COLOR UR: NORMAL
CREAT SERPL-MCNC: 1.35 MG/DL (ref 0.5–1.3)
ERYTHROCYTE [DISTWIDTH] IN BLOOD BY AUTOMATED COUNT: 13.3 % (ref 11.5–14.5)
FERRITIN SERPL-MCNC: 100 NG/ML (ref 20–300)
GFR SERPL CREATININE-BSD FRML MDRD: 55 ML/MIN/1.73M*2
GLUCOSE SERPL-MCNC: 51 MG/DL (ref 74–99)
GLUCOSE UR STRIP.AUTO-MCNC: NEGATIVE MG/DL
HCT VFR BLD AUTO: 45.4 % (ref 41–52)
HGB BLD-MCNC: 14.5 G/DL (ref 13.5–17.5)
IRON SATN MFR SERPL: 27 % (ref 25–45)
IRON SERPL-MCNC: 86 UG/DL (ref 35–150)
KETONES UR STRIP.AUTO-MCNC: NEGATIVE MG/DL
LEUKOCYTE ESTERASE UR QL STRIP.AUTO: NEGATIVE
MCH RBC QN AUTO: 28.3 PG (ref 26–34)
MCHC RBC AUTO-ENTMCNC: 31.9 G/DL (ref 32–36)
MCV RBC AUTO: 89 FL (ref 80–100)
NITRITE UR QL STRIP.AUTO: NEGATIVE
NRBC BLD-RTO: 0 /100 WBCS (ref 0–0)
PH UR STRIP.AUTO: 6 [PH]
PHOSPHATE SERPL-MCNC: 3 MG/DL (ref 2.5–4.9)
PLATELET # BLD AUTO: 199 X10*3/UL (ref 150–450)
POTASSIUM SERPL-SCNC: 3.7 MMOL/L (ref 3.5–5.3)
PROT UR STRIP.AUTO-MCNC: NEGATIVE MG/DL
RBC # BLD AUTO: 5.13 X10*6/UL (ref 4.5–5.9)
RBC # UR STRIP.AUTO: NEGATIVE /UL
SODIUM SERPL-SCNC: 143 MMOL/L (ref 136–145)
SP GR UR STRIP.AUTO: 1.01
TIBC SERPL-MCNC: 320 UG/DL (ref 240–445)
UIBC SERPL-MCNC: 234 UG/DL (ref 110–370)
URATE SERPL-MCNC: 7.3 MG/DL (ref 4–7.5)
UROBILINOGEN UR STRIP.AUTO-MCNC: <2 MG/DL
WBC # BLD AUTO: 8.4 X10*3/UL (ref 4.4–11.3)

## 2023-12-14 PROCEDURE — 84155 ASSAY OF PROTEIN SERUM: CPT

## 2023-12-14 PROCEDURE — 1160F RVW MEDS BY RX/DR IN RCRD: CPT | Performed by: INTERNAL MEDICINE

## 2023-12-14 PROCEDURE — 81003 URINALYSIS AUTO W/O SCOPE: CPT

## 2023-12-14 PROCEDURE — 86320 SERUM IMMUNOELECTROPHORESIS: CPT | Performed by: INTERNAL MEDICINE

## 2023-12-14 PROCEDURE — 99205 OFFICE O/P NEW HI 60 MIN: CPT | Performed by: INTERNAL MEDICINE

## 2023-12-14 PROCEDURE — 83550 IRON BINDING TEST: CPT

## 2023-12-14 PROCEDURE — 3044F HG A1C LEVEL LT 7.0%: CPT | Performed by: INTERNAL MEDICINE

## 2023-12-14 PROCEDURE — 1036F TOBACCO NON-USER: CPT | Performed by: INTERNAL MEDICINE

## 2023-12-14 PROCEDURE — 3066F NEPHROPATHY DOC TX: CPT | Performed by: INTERNAL MEDICINE

## 2023-12-14 PROCEDURE — 3078F DIAST BP <80 MM HG: CPT | Performed by: INTERNAL MEDICINE

## 2023-12-14 PROCEDURE — 83970 ASSAY OF PARATHORMONE: CPT

## 2023-12-14 PROCEDURE — 1126F AMNT PAIN NOTED NONE PRSNT: CPT | Performed by: INTERNAL MEDICINE

## 2023-12-14 PROCEDURE — 80069 RENAL FUNCTION PANEL: CPT

## 2023-12-14 PROCEDURE — 82306 VITAMIN D 25 HYDROXY: CPT

## 2023-12-14 PROCEDURE — 82570 ASSAY OF URINE CREATININE: CPT

## 2023-12-14 PROCEDURE — 3077F SYST BP >= 140 MM HG: CPT | Performed by: INTERNAL MEDICINE

## 2023-12-14 PROCEDURE — 85027 COMPLETE CBC AUTOMATED: CPT

## 2023-12-14 PROCEDURE — 82728 ASSAY OF FERRITIN: CPT

## 2023-12-14 PROCEDURE — 84550 ASSAY OF BLOOD/URIC ACID: CPT

## 2023-12-14 PROCEDURE — 36415 COLL VENOUS BLD VENIPUNCTURE: CPT

## 2023-12-14 PROCEDURE — 1159F MED LIST DOCD IN RCRD: CPT | Performed by: INTERNAL MEDICINE

## 2023-12-14 PROCEDURE — 86334 IMMUNOFIX E-PHORESIS SERUM: CPT

## 2023-12-14 PROCEDURE — 83540 ASSAY OF IRON: CPT

## 2023-12-14 PROCEDURE — 84165 PROTEIN E-PHORESIS SERUM: CPT

## 2023-12-14 PROCEDURE — 82043 UR ALBUMIN QUANTITATIVE: CPT

## 2023-12-14 PROCEDURE — 84165 PROTEIN E-PHORESIS SERUM: CPT | Performed by: INTERNAL MEDICINE

## 2023-12-14 NOTE — PROGRESS NOTES
Panel Management Review      Patient has the following on her problem list: None      Composite cancer screening  Chart review shows that this patient is due/due soon for the following Pap Smear and Colonoscopy  Summary:    Patient is due/failing the following:   COLONOSCOPY and PAP    Action needed:   Patient needs office visit for pap smear.    Type of outreach:    Sent letter.    Questions for provider review:    None                                                                                                                                    Vu Herrera MA       Chart routed to none .           "Subjective       Patel (Jeet Goncalves is a 73 y.o. male who has past medical history of coronary artery disease status post CABG, hypertension, type 2 diabetes-well-controlled, anemia, gout who is coming to see me today initial consultation for CKD management    Tatyana came alone today.  He is not aware of history of chronic kidney disease.  He had CABG done in 2022 with good results.  No leg swelling or shortness of breath.  He enjoys motorcycling.  No chest pain.  He recently started switching to healthier diet.  He is non-smoker.  No significant NSAID use at home.  No lower urine tract symptoms.  No foam or bubbles in the urine.  He could not leave urine sample today      Objective   /79 (BP Location: Left arm, Patient Position: Standing, BP Cuff Size: Adult)   Pulse 63   Temp 36.7 °C (98 °F)   Resp 16   Ht 1.753 m (5' 9\")   Wt 90.1 kg (198 lb 9.6 oz)   SpO2 98%   BMI 29.33 kg/m²   Wt Readings from Last 3 Encounters:   12/14/23 90.1 kg (198 lb 9.6 oz)   11/17/23 92.5 kg (203 lb 14.8 oz)   10/30/23 85.7 kg (189 lb)       Physical Exam    General appearance: no distress awake and alert on room air, euvolemic on exam  Eyes: non-icteric  HEENT: atrumatic head, PEERLA, moist mucosa  Skin: no apparent rash  Heart: NSR, S1, S2 normal, no murmur or gallop  Lungs: Symmetrical expansion,CTA bilat no wheezing/crackles  Abdomen: soft, nt/nd, obese  Extremities: no edema bilat  Neuro: No FND,asterixis, no focal deficits noticed        Review of Systems     Constitutional: no fever, no chills, no recent weight gain and no recent weight loss.   Eyes: no blurred vision and no diplopia.   ENT: no hearing loss, no earache, no sore throat, no swollen glands in the neck and no nasal discharge.   Cardiovascular: no chest pain, no palpitations and no lower extremity edema.   Respiratory: no shortness of breath, no chronic cough and no shortness of breath during exertion.   Gastrointestinal: no abdominal pain, no " "constipation, no heartburn, no vomiting, no bloody stools and no change in bowel movements.   Genitourinary: no dysuria and no hematuria.   Musculoskeletal: no arthralgias and no myalgias.   Skin: no rashes and no skin lesions.   Neurological: no headaches and no dizziness.   Psychiatric: no confusion, no depression and no anxiety.   Endocrine: no heat intolerance, no cold intolerance, appetite not increased, no thyroid disorder, no increased urinary frequency and no dry skin.   Hematologic/Lymphatic: does not bleed easily and does not bruise easily.   All other systems have been reviewed and are negative for complaint.         Data Review                   Lab Results   Component Value Date    URICACID 8.1 (H) 07/18/2023           Lab Results   Component Value Date    HGBA1C 6.9 (A) 08/03/2023                 No lab exists for component: \"CR\", \"PHOSPHORUS\"        Albumin/Creatine Ratio   Date Value Ref Range Status   10/12/2022 243.7 (H) 0.0 - 30.0 ug/mg crt Final            RFP  Recent Labs     08/14/23  1004 08/04/23  1153 08/04/23  0637 08/03/23  2151 08/03/23  1549 08/03/23  1405 08/02/23  1449 11/30/22  1018 11/13/22  0557 11/12/22  0522    135* 140 136 138 134* 135*   < > 139 136   K 4.6 5.5* 6.0* 5.7* 6.1* 6.2* 6.3*   < > 3.5 4.2    108* 111* 110* 109* 108* 109*   < > 103 101   CO2 25 20* 22 18* 18* 17* 17*   < > 28 27   BUN 33* 48* 52* 60* 66* 70* 72*   < > 29* 35*   CREATININE 1.74* 1.42* 1.51* 1.58* 1.75* 1.89* 2.14*   < > 1.02 1.23   GLUCOSE 135* 325* 148* 259* 171* 283* 190*   < > 55* 116*   CALCIUM 9.7 9.7 9.6 9.7 10.6* 10.4* 10.7*   < > 8.1* 8.4*   PHOS  --  4.2 4.9 4.5 3.8  --  4.9  --  4.4 3.9   ANIONGAP 13 13 13 14 17 15 15   < > 12 12    < > = values in this interval not displayed.        Urineanalysis  Recent Labs     08/03/23  1602 10/19/22  1426 10/12/22  1410   COLORU YELLOW YELLOW YELLOW   APPEARANCEU HAZY CLEAR CLEAR   SPECGRAVU 1.011 1.011 1.015   CLARISSA 5.0 5.0 5.0   PROTUR " NEGATIVE NEGATIVE 30(1+)*   GLUCOSEU 150(2+)* >=500(3+)* >=500(3+)*   BLOODU NEGATIVE NEGATIVE SMALL(1+)*   KETONESU NEGATIVE NEGATIVE NEGATIVE   BILIRUBINU NEGATIVE NEGATIVE NEGATIVE   NITRITEU NEGATIVE NEGATIVE NEGATIVE   LEUKOCYTESU NEGATIVE NEGATIVE NEGATIVE       Urine Electrolytes  Recent Labs     08/03/23  1602 10/19/22  1426 10/12/22  1410   CREATU  --   --  73.4   PROTUR NEGATIVE NEGATIVE 30(1+)*   MICROALBCREA  --   --  243.7*        Urine Micro  Recent Labs     10/12/22  1410   WBCU 5   RBCU 1   SQUAMEPIU 1   MUCUSU FEW        Iron  Recent Labs     07/18/23  1229 11/30/22  1018   IRON 92 63   TIBC 298 329   IRONSAT 31 19*   FERRITIN 124 178            Current Outpatient Medications:     aspirin 81 mg chewable tablet, Chew once daily., Disp: , Rfl:     atorvastatin (Lipitor) 80 mg tablet, Take 1 tablet (80 mg) by mouth once daily at bedtime., Disp: 90 tablet, Rfl: 1    clopidogrel (Plavix) 75 mg tablet, Take 1 tablet (75 mg) by mouth once daily., Disp: , Rfl:     Ferrex 150 150 mg iron capsule, Take 1 capsule (150 mg) by mouth once daily., Disp: , Rfl:     FreeStyle Evy reader (FreeStyle Evy 2 [place]) misc, Use as instructed, Disp: 1 each, Rfl: 0    FreeStyle Evy sensor system (FreeStyle Evy 2 Sensor) kit, Replace sensor every 14 days, Disp: 2 each, Rfl: 0    furosemide (Lasix) 40 mg tablet, Take 1 tablet (40 mg) by mouth once daily., Disp: 90 tablet, Rfl: 0    insulin glargine (Lantus Solostar U-100 Insulin) 100 unit/mL (3 mL) pen, INJECT 35 UNITS SUBCUTANEOUSLY AT BEDTIME, Disp: 15 mL, Rfl: 2    insulin lispro (HumaLOG) 100 unit/mL injection, INJECT 8 UNITS SUBCUTANEOUSLY 3 TIMES DAILY PLUS SLIDING SCALE. 151-200, 2 UNITS. 201-250, 4 UNITS. 251-300, 6 UNITS. 301-350, 8 UNITS. 351-400, 10 UNITS. MAX MEAL DOSE 18 UNITS. MAX DAILY 54 UNITS, Disp: 4 mL, Rfl: 2    metoprolol succinate XL (Toprol-XL) 200 mg 24 hr tablet, Take 1 tablet (200 mg) by mouth once daily., Disp: , Rfl:     multivitamin tablet,  "Take 1 tablet by mouth once daily., Disp: , Rfl:     pen needle, diabetic 31 gauge x 5/16\" needle, Use with insulin for DM II 4 times daily, Disp: 400 each, Rfl: 2      Assessment and Plan     Sweeney (Lynn) JOSE Goncalves is a 73 y.o. male who has past medical history of coronary artery disease status post CABG, hypertension, type 2 diabetes-well-controlled, anemia, gout who is coming to see me today initial consultation for CKD management    Impression    # Chronic kidney disease -G3 B/A1-kidney function was all normal up until July 2023.  Baseline serum creatinine since then is stable 1.5-2 GFR 40-50 mill per minute troponin 23 m².  Prior spot ACR is negative.  CKD is most likely atherosclerotic cardiovascular disease.  Currently is not on RAAS inhibitors-we will consider with follow-up since he had issues with hyperkalemia in the past.  Within normal electrolytes.  No kidney image to review-will defer at this time        # BP -is accepted.  He does not check at home.  Current medication Lasix 40 mg, metoprolol 100 mg daily.  Will consider adding RAAS inhibitors plus or minus potassium binders based on repeat blood work with follow-up        #Anemia Hb 11-11.5.  Will check iron storage      #(CKD)-MBD  -Will check calcium, phosphorus, PTH and vitamin D    # CVS/coronary artery disease status post CABG/CHF-echo 40-45% EF.  Currently not on RAAS inhibitor/SGL 2 inhibitor/finerenone-will consider all with follow-up  -Continue atorvastatin 80 mg, baby aspirin and Plavix    #Diabetes-good control.  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  We will recheck.  Low threshold to start uric acid lowering therapy      #Others  - No NSAIDs, no contrast as possible. If to be done- we recommend holding ACEi/ARBS/diuretics 24 hrs prior to contrast exposure and ensure appropriate hydration   - Ensure well hydration  - Limit salt in diet  - No smoking    Patient received CKD education and counselling    Reviewed blood work " today-will call with results further recommendation    Follow-up in 3 months with another repeat blood work and urinalysis prior to visit

## 2023-12-14 NOTE — PATIENT INSTRUCTIONS
Was nice meeting you nephrology clinic today-discussed the following    # Chronic kidney disease stage III around 40%.  Possible atherosclerotic cardiovascular disease.  Repeat blood work today    # Hypertension-blood pressures accepted.  Continue Lasix 40 mg/metoprolol 200 mg    # Coronary artery disease status post CABG    Blood work today-I will call with results and further recommendation  Follow-up in 3 months with another repeat blood work and urinalysis prior to visit

## 2023-12-15 ENCOUNTER — TELEPHONE (OUTPATIENT)
Dept: NEPHROLOGY | Facility: CLINIC | Age: 73
End: 2023-12-15
Payer: MEDICARE

## 2023-12-15 LAB
25(OH)D3 SERPL-MCNC: 41 NG/ML (ref 30–100)
CREAT UR-MCNC: 29.3 MG/DL (ref 20–370)
MICROALBUMIN UR-MCNC: 98.3 MG/L
MICROALBUMIN/CREAT UR: 335.5 UG/MG CREAT
PROT SERPL-MCNC: 7 G/DL (ref 6.4–8.2)
PTH-INTACT SERPL-MCNC: 47 PG/ML (ref 18.5–88)

## 2023-12-15 NOTE — TELEPHONE ENCOUNTER
----- Message from Danika Baer MD sent at 12/15/2023  1:54 PM EST -----  Plz call   Kidney function seems to have improved up to 555. Theres is portien leak in the urine. No anemia. Will follow up as planned in March 2024  Dr Buffy BOYCE

## 2023-12-18 DIAGNOSIS — I25.810 CORONARY ARTERY DISEASE INVOLVING CORONARY BYPASS GRAFT OF NATIVE HEART, UNSPECIFIED WHETHER ANGINA PRESENT: ICD-10-CM

## 2023-12-18 DIAGNOSIS — E11.69 TYPE 2 DIABETES MELLITUS WITH OTHER SPECIFIED COMPLICATION, UNSPECIFIED WHETHER LONG TERM INSULIN USE (MULTI): ICD-10-CM

## 2023-12-18 LAB
ALBUMIN: 4.2 G/DL (ref 3.4–5)
ALPHA 1 GLOBULIN: 0.2 G/DL (ref 0.2–0.6)
ALPHA 2 GLOBULIN: 1 G/DL (ref 0.4–1.1)
BETA GLOBULIN: 0.9 G/DL (ref 0.5–1.2)
GAMMA GLOBULIN: 0.7 G/DL (ref 0.5–1.4)
IMMUNOFIXATION COMMENT: NORMAL
PATH REVIEW - SERUM IMMUNOFIXATION: NORMAL
PATH REVIEW-SERUM PROTEIN ELECTROPHORESIS: NORMAL
PROTEIN ELECTROPHORESIS COMMENT: NORMAL

## 2023-12-18 RX ORDER — CLOPIDOGREL BISULFATE 75 MG/1
75 TABLET ORAL DAILY
Qty: 90 TABLET | Refills: 0 | Status: SHIPPED | OUTPATIENT
Start: 2023-12-18 | End: 2024-03-18 | Stop reason: SDUPTHER

## 2023-12-18 RX ORDER — FLASH GLUCOSE SENSOR
KIT MISCELLANEOUS
Qty: 2 EACH | Refills: 2 | Status: SHIPPED | OUTPATIENT
Start: 2023-12-18 | End: 2024-02-27

## 2023-12-18 RX ORDER — METOPROLOL SUCCINATE 200 MG/1
200 TABLET, EXTENDED RELEASE ORAL DAILY
Qty: 90 TABLET | Refills: 0 | Status: SHIPPED | OUTPATIENT
Start: 2023-12-18 | End: 2024-03-26 | Stop reason: SDUPTHER

## 2024-02-12 DIAGNOSIS — E11.69 TYPE 2 DIABETES MELLITUS WITH OTHER SPECIFIED COMPLICATION, WITH LONG-TERM CURRENT USE OF INSULIN (MULTI): ICD-10-CM

## 2024-02-12 DIAGNOSIS — Z79.4 TYPE 2 DIABETES MELLITUS WITH OTHER SPECIFIED COMPLICATION, WITH LONG-TERM CURRENT USE OF INSULIN (MULTI): ICD-10-CM

## 2024-02-12 RX ORDER — INSULIN LISPRO 100 [IU]/ML
INJECTION, SOLUTION INTRAVENOUS; SUBCUTANEOUS
Qty: 4 ML | Refills: 2 | Status: SHIPPED | OUTPATIENT
Start: 2024-02-12 | End: 2024-02-20 | Stop reason: SDUPTHER

## 2024-02-20 DIAGNOSIS — Z79.4 TYPE 2 DIABETES MELLITUS WITH OTHER SPECIFIED COMPLICATION, WITH LONG-TERM CURRENT USE OF INSULIN (MULTI): ICD-10-CM

## 2024-02-20 DIAGNOSIS — E11.69 TYPE 2 DIABETES MELLITUS WITH OTHER SPECIFIED COMPLICATION, WITH LONG-TERM CURRENT USE OF INSULIN (MULTI): ICD-10-CM

## 2024-02-20 RX ORDER — INSULIN LISPRO 100 [IU]/ML
INJECTION, SOLUTION INTRAVENOUS; SUBCUTANEOUS
Qty: 10 ML | Refills: 2 | Status: SHIPPED | OUTPATIENT
Start: 2024-02-20

## 2024-02-27 ENCOUNTER — OFFICE VISIT (OUTPATIENT)
Dept: PRIMARY CARE | Facility: CLINIC | Age: 74
End: 2024-02-27
Payer: MEDICARE

## 2024-02-27 VITALS
WEIGHT: 198 LBS | HEIGHT: 69 IN | SYSTOLIC BLOOD PRESSURE: 160 MMHG | RESPIRATION RATE: 18 BRPM | BODY MASS INDEX: 29.33 KG/M2 | DIASTOLIC BLOOD PRESSURE: 86 MMHG | OXYGEN SATURATION: 97 % | HEART RATE: 55 BPM

## 2024-02-27 DIAGNOSIS — Z00.00 ENCOUNTER FOR ANNUAL WELLNESS VISIT (AWV) IN MEDICARE PATIENT: ICD-10-CM

## 2024-02-27 DIAGNOSIS — I10 ESSENTIAL HYPERTENSION: ICD-10-CM

## 2024-02-27 DIAGNOSIS — Z79.4 TYPE 2 DIABETES MELLITUS WITH OTHER SPECIFIED COMPLICATION, WITH LONG-TERM CURRENT USE OF INSULIN (MULTI): ICD-10-CM

## 2024-02-27 DIAGNOSIS — Z95.1 S/P CABG (CORONARY ARTERY BYPASS GRAFT): ICD-10-CM

## 2024-02-27 DIAGNOSIS — Z79.4 TYPE 2 DIABETES MELLITUS WITHOUT COMPLICATION, WITH LONG-TERM CURRENT USE OF INSULIN (MULTI): ICD-10-CM

## 2024-02-27 DIAGNOSIS — I25.119 CORONARY ARTERY DISEASE INVOLVING NATIVE CORONARY ARTERY OF NATIVE HEART WITH ANGINA PECTORIS (CMS-HCC): ICD-10-CM

## 2024-02-27 DIAGNOSIS — E11.9 TYPE 2 DIABETES MELLITUS WITHOUT COMPLICATION, WITH LONG-TERM CURRENT USE OF INSULIN (MULTI): ICD-10-CM

## 2024-02-27 DIAGNOSIS — E11.69 TYPE 2 DIABETES MELLITUS WITH OTHER SPECIFIED COMPLICATION, UNSPECIFIED WHETHER LONG TERM INSULIN USE (MULTI): ICD-10-CM

## 2024-02-27 DIAGNOSIS — N18.32 STAGE 3B CHRONIC KIDNEY DISEASE (MULTI): Primary | ICD-10-CM

## 2024-02-27 DIAGNOSIS — E11.69 TYPE 2 DIABETES MELLITUS WITH OTHER SPECIFIED COMPLICATION, WITH LONG-TERM CURRENT USE OF INSULIN (MULTI): ICD-10-CM

## 2024-02-27 DIAGNOSIS — I50.43 ACUTE ON CHRONIC COMBINED SYSTOLIC AND DIASTOLIC HEART FAILURE (MULTI): ICD-10-CM

## 2024-02-27 DIAGNOSIS — E78.49 OTHER HYPERLIPIDEMIA: ICD-10-CM

## 2024-02-27 PROBLEM — N17.9 AKI (ACUTE KIDNEY INJURY) (CMS-HCC): Status: RESOLVED | Noted: 2023-08-06 | Resolved: 2024-02-27

## 2024-02-27 PROBLEM — R79.89 ELEVATED SERUM CREATININE: Status: RESOLVED | Noted: 2023-07-13 | Resolved: 2024-02-27

## 2024-02-27 LAB — POC HEMOGLOBIN A1C: 8.8 % (ref 4.2–6.5)

## 2024-02-27 PROCEDURE — 1036F TOBACCO NON-USER: CPT | Performed by: INTERNAL MEDICINE

## 2024-02-27 PROCEDURE — G0439 PPPS, SUBSEQ VISIT: HCPCS | Performed by: INTERNAL MEDICINE

## 2024-02-27 PROCEDURE — 1159F MED LIST DOCD IN RCRD: CPT | Performed by: INTERNAL MEDICINE

## 2024-02-27 PROCEDURE — 83036 HEMOGLOBIN GLYCOSYLATED A1C: CPT | Performed by: INTERNAL MEDICINE

## 2024-02-27 PROCEDURE — 4010F ACE/ARB THERAPY RXD/TAKEN: CPT | Performed by: INTERNAL MEDICINE

## 2024-02-27 PROCEDURE — 1157F ADVNC CARE PLAN IN RCRD: CPT | Performed by: INTERNAL MEDICINE

## 2024-02-27 PROCEDURE — 99213 OFFICE O/P EST LOW 20 MIN: CPT | Performed by: INTERNAL MEDICINE

## 2024-02-27 PROCEDURE — 1170F FXNL STATUS ASSESSED: CPT | Performed by: INTERNAL MEDICINE

## 2024-02-27 PROCEDURE — 1126F AMNT PAIN NOTED NONE PRSNT: CPT | Performed by: INTERNAL MEDICINE

## 2024-02-27 PROCEDURE — 3079F DIAST BP 80-89 MM HG: CPT | Performed by: INTERNAL MEDICINE

## 2024-02-27 PROCEDURE — 3077F SYST BP >= 140 MM HG: CPT | Performed by: INTERNAL MEDICINE

## 2024-02-27 RX ORDER — DAPAGLIFLOZIN 5 MG/1
5 TABLET, FILM COATED ORAL DAILY
Qty: 90 TABLET | Refills: 1 | Status: CANCELLED | OUTPATIENT
Start: 2024-02-27

## 2024-02-27 RX ORDER — LISINOPRIL 40 MG/1
40 TABLET ORAL DAILY
COMMUNITY
Start: 2023-11-15

## 2024-02-27 RX ORDER — FLASH GLUCOSE SENSOR
KIT MISCELLANEOUS
Qty: 2 EACH | Refills: 0 | Status: SHIPPED | OUTPATIENT
Start: 2024-02-27 | End: 2024-03-13 | Stop reason: SDUPTHER

## 2024-02-27 ASSESSMENT — ACTIVITIES OF DAILY LIVING (ADL)
BATHING: INDEPENDENT
GROCERY_SHOPPING: INDEPENDENT
DRESSING: INDEPENDENT
DOING_HOUSEWORK: INDEPENDENT
MANAGING_FINANCES: INDEPENDENT
TAKING_MEDICATION: INDEPENDENT

## 2024-02-27 ASSESSMENT — PATIENT HEALTH QUESTIONNAIRE - PHQ9
2. FEELING DOWN, DEPRESSED OR HOPELESS: NOT AT ALL
1. LITTLE INTEREST OR PLEASURE IN DOING THINGS: NOT AT ALL
SUM OF ALL RESPONSES TO PHQ9 QUESTIONS 1 AND 2: 0

## 2024-02-27 ASSESSMENT — PAIN SCALES - GENERAL: PAINLEVEL: 0-NO PAIN

## 2024-02-27 NOTE — PROGRESS NOTES
"Patient ID:   Patel Goncalves \"Mckenna" is a 74 y.o. male with PMH remarkable for CAD s/p CABG x4 on 11/28/2022 with Dr Ewelina Trujillo, DM2, Gout, HTN, CKD who presents to the office today for Medicare Annual Wellness Visit Subsequent.    HEALTH MAINTENANCE: Annual Medicare Wellness Visit  Smoking: Never a Smoker  Labs: 12/14/2023  Lipid 7/18/2023  PSA: DUE on next lab draw  HgbA1c 8.8 today (was 6.9 on 8/2023)  Colonoscopy (45-75): DUE  ECHO 3/3/2023 showed LVSF 40-45%, septal motion consistent with postOp status, abnormal pattern of LVDF, low normal RVSF, mild AV stenosis. Mild MR, TR. PAP mildly elevated.   CT Lung done 10/19/2022 - moderate atherosclerotic calcifications, left kidney stone, borderline ascending aortic aneurysm measuring 4.0cm.     Felt winded when doing 4 flights of stairs recently. Walks a lot per day. Advised for his to let us know if this gets worse.     Had right ring finger trigger finger done 11/17/2023 with Dr Alejandra    Issue with LANTUS and not being due for refill per pharmacy  Reports that left hand is hand on and off.     Nephrology: Dr Buffy MD  Cardio: Dr Aleyda MD    SOCIAL HISTORY:  Social History     Tobacco Use    Smoking status: Never    Smokeless tobacco: Never   Vaping Use    Vaping Use: Never used   Substance Use Topics    Alcohol use: Not Currently     Comment: occ    Drug use: Not Currently     Social History     Tobacco Use    Smoking status: Never    Smokeless tobacco: Never   Substance Use Topics    Alcohol use: Not Currently     Comment: [facility]      Review of Systems   All other systems reviewed and are negative.    VITAL SIGNS:  Vitals:    02/27/24 1026   BP: 160/86   Pulse: 55   Resp: 18   SpO2: 97%     ALLERGIES:  Allergies   Allergen Reactions    Bee Venom Protein (Honey Bee) Hives     Hives from hornets    Ibuprofen Itching    Naproxen Sodium Itching      Physical Exam  Vitals reviewed.   Constitutional:       General: He is not in acute distress.     Appearance: " Normal appearance. He is not ill-appearing.   HENT:      Head: Normocephalic and atraumatic.      Right Ear: Tympanic membrane and external ear normal.      Left Ear: Tympanic membrane and external ear normal.      Nose: Nose normal.      Mouth/Throat:      Mouth: Mucous membranes are moist.      Pharynx: Oropharynx is clear.   Eyes:      Conjunctiva/sclera: Conjunctivae normal.      Pupils: Pupils are equal, round, and reactive to light.   Cardiovascular:      Rate and Rhythm: Normal rate and regular rhythm.      Heart sounds: Normal heart sounds. No murmur heard.  Pulmonary:      Effort: Pulmonary effort is normal. No respiratory distress.      Breath sounds: Normal breath sounds. No wheezing.   Abdominal:      General: There is no distension.      Palpations: Abdomen is soft. There is no mass.      Tenderness: There is no abdominal tenderness.   Musculoskeletal:         General: Normal range of motion.      Cervical back: Normal range of motion and neck supple.   Skin:     General: Skin is warm and dry.   Neurological:      General: No focal deficit present.      Mental Status: He is alert and oriented to person, place, and time.      Sensory: No sensory deficit.      Motor: No weakness.      Coordination: Coordination normal.      Gait: Gait normal.   Psychiatric:         Mood and Affect: Mood normal.         Behavior: Behavior normal.     MEDICATIONS:  Current Outpatient Medications   Medication Instructions    aspirin 81 mg chewable tablet oral, Daily    atorvastatin (LIPITOR) 80 mg, oral, Nightly    clopidogrel (PLAVIX) 75 mg, oral, Daily    Ferrex 150 150 mg iron capsule 1 capsule, oral, Daily    FreeStyle Evy 2 Sensor kit REPLACE SENSOR EVERY 14 DAYS    FreeStyle Evy reader (FreeStyle Evy 2 [place]) misc Use as instructed    furosemide (LASIX) 40 mg, oral, Daily    insulin glargine (Lantus Solostar U-100 Insulin) 100 unit/mL (3 mL) pen INJECT 35 UNITS SUBCUTANEOUSLY AT BEDTIME    insulin lispro  "(HumaLOG) 100 unit/mL injection INJECT 8 UNITS SUBCUTANEOUSLY 3 TIMES DAILY PLUS SLIDING SCALE. 151-200, 2 UNITS. 201-250, 4 UNITS. 251-300, 6 UNITS. 301-350, 8 UNITS. 351-400, 10 UNITS. MAX MEAL DOSE 18 UNITS. MAX DAILY 54 UNITS    lisinopril 40 mg, oral, Daily, for blood pressure    metoprolol succinate XL (TOPROL-XL) 200 mg, oral, Daily    multivitamin tablet 1 tablet, oral, Daily    pen needle, diabetic 31 gauge x 5/16\" needle Use with insulin for DM II 4 times daily      RECENT LABS:  Lab Results   Component Value Date    WBC 8.4 12/14/2023    HGB 14.5 12/14/2023    HCT 45.4 12/14/2023     12/14/2023    CHOL 86 07/18/2023    TRIG 155 (H) 07/18/2023    HDL 36.2 (A) 07/18/2023    ALT 32 08/14/2023    AST 24 08/14/2023     12/14/2023    K 3.7 12/14/2023     12/14/2023    CREATININE 1.35 (H) 12/14/2023    BUN 18 12/14/2023    CO2 28 12/14/2023    TSH 1.81 10/20/2022    INR 1.2 (H) 10/18/2022    HGBA1C 8.8 (A) 02/27/2024     ASSESSMENT AND PLAN:  Assessment/Plan   Patel was seen today for medicare annual wellness visit subsequent.  Diagnoses and all orders for this visit:  Encounter for annual wellness visit (AWV) in Medicare patient  Type 2 diabetes mellitus without complication, with long-term current use of insulin (CMS/[facility])  Comments:  - c/w current tx regimen  - having an issue with lantus with the pharmacy. will call and see what we can do  Orders:  -     POCT glycosylated hemoglobin (Hb A1C) manually resulted  S/P CABG (coronary artery bypass graft)  Comments:  - c/w plavix and baby ASA  Essential hypertension  Comments:  - c/w lisinopril, metoprolol XR  Other hyperlipidemia  Comments:  - c/w statin  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 pectoris (CMS/[facility])  Comments:  - c/w statin, baby ASA   --------------------  Written by Melody De Leon RN, acting as a scribe for Dr. Hidalgo. This note accurately reflects the work and decisions made by Dr." Rocky.     I, Dr. Hidalgo, attest all medical record entries made by the scribe were under my direction and were personally dictated by me. I have reviewed the chart and agree that the record accurately reflects my performance of the history, physical exam, and assessment and plan.

## 2024-02-28 PROBLEM — E87.5 HYPERKALEMIA: Status: RESOLVED | Noted: 2023-08-06 | Resolved: 2024-02-28

## 2024-03-01 PROBLEM — N18.32 STAGE 3B CHRONIC KIDNEY DISEASE (MULTI): Status: ACTIVE | Noted: 2024-03-01

## 2024-03-01 PROBLEM — E66.01 MORBID (SEVERE) OBESITY DUE TO EXCESS CALORIES (MULTI): Status: RESOLVED | Noted: 2023-07-27 | Resolved: 2024-03-01

## 2024-03-13 DIAGNOSIS — E11.69 TYPE 2 DIABETES MELLITUS WITH OTHER SPECIFIED COMPLICATION, UNSPECIFIED WHETHER LONG TERM INSULIN USE (MULTI): ICD-10-CM

## 2024-03-13 RX ORDER — FLASH GLUCOSE SENSOR
KIT MISCELLANEOUS
Qty: 2 EACH | Refills: 0 | Status: SHIPPED | OUTPATIENT
Start: 2024-03-13

## 2024-03-13 RX ORDER — FLASH GLUCOSE SENSOR
KIT MISCELLANEOUS
Qty: 2 EACH | Refills: 0 | Status: SHIPPED | OUTPATIENT
Start: 2024-03-13 | End: 2024-04-02 | Stop reason: SDUPTHER

## 2024-03-18 DIAGNOSIS — Z00.00 HEALTH CARE MAINTENANCE: ICD-10-CM

## 2024-03-18 DIAGNOSIS — I25.810 CORONARY ARTERY DISEASE INVOLVING CORONARY BYPASS GRAFT OF NATIVE HEART, UNSPECIFIED WHETHER ANGINA PRESENT: ICD-10-CM

## 2024-03-18 RX ORDER — FUROSEMIDE 40 MG/1
40 TABLET ORAL DAILY
Qty: 90 TABLET | Refills: 0 | Status: SHIPPED | OUTPATIENT
Start: 2024-03-18 | End: 2024-06-16

## 2024-03-18 RX ORDER — CLOPIDOGREL BISULFATE 75 MG/1
75 TABLET ORAL DAILY
Qty: 90 TABLET | Refills: 0 | Status: SHIPPED | OUTPATIENT
Start: 2024-03-18

## 2024-03-21 ENCOUNTER — LAB (OUTPATIENT)
Dept: LAB | Facility: LAB | Age: 74
End: 2024-03-21
Payer: MEDICARE

## 2024-03-21 ENCOUNTER — OFFICE VISIT (OUTPATIENT)
Dept: NEPHROLOGY | Facility: CLINIC | Age: 74
End: 2024-03-21
Payer: MEDICARE

## 2024-03-21 VITALS
SYSTOLIC BLOOD PRESSURE: 154 MMHG | RESPIRATION RATE: 16 BRPM | DIASTOLIC BLOOD PRESSURE: 86 MMHG | HEART RATE: 53 BPM | TEMPERATURE: 97.5 F | BODY MASS INDEX: 30.19 KG/M2 | OXYGEN SATURATION: 97 % | HEIGHT: 68 IN | WEIGHT: 199.2 LBS

## 2024-03-21 DIAGNOSIS — R79.89 ELEVATED SERUM CREATININE: ICD-10-CM

## 2024-03-21 DIAGNOSIS — N18.31 STAGE 3A CHRONIC KIDNEY DISEASE (MULTI): Primary | ICD-10-CM

## 2024-03-21 DIAGNOSIS — I10 ESSENTIAL HYPERTENSION: ICD-10-CM

## 2024-03-21 DIAGNOSIS — N18.31 STAGE 3A CHRONIC KIDNEY DISEASE (MULTI): ICD-10-CM

## 2024-03-21 DIAGNOSIS — N06.0 ISOLATED PROTEINURIA WITH MINOR GLOMERULAR ABNORMALITY: ICD-10-CM

## 2024-03-21 DIAGNOSIS — D50.8 OTHER IRON DEFICIENCY ANEMIA: ICD-10-CM

## 2024-03-21 DIAGNOSIS — E11.00 TYPE 2 DIABETES MELLITUS WITH HYPEROSMOLARITY WITHOUT COMA, WITHOUT LONG-TERM CURRENT USE OF INSULIN (MULTI): ICD-10-CM

## 2024-03-21 DIAGNOSIS — M10.9 GOUT, ARTHRITIS: ICD-10-CM

## 2024-03-21 DIAGNOSIS — N18.32 STAGE 3B CHRONIC KIDNEY DISEASE (MULTI): ICD-10-CM

## 2024-03-21 LAB
ALBUMIN SERPL BCP-MCNC: 4.5 G/DL (ref 3.4–5)
ANION GAP SERPL CALC-SCNC: 14 MMOL/L (ref 10–20)
BUN SERPL-MCNC: 24 MG/DL (ref 6–23)
CALCIUM SERPL-MCNC: 9.9 MG/DL (ref 8.6–10.3)
CHLORIDE SERPL-SCNC: 99 MMOL/L (ref 98–107)
CO2 SERPL-SCNC: 30 MMOL/L (ref 21–32)
CREAT SERPL-MCNC: 1.33 MG/DL (ref 0.5–1.3)
EGFRCR SERPLBLD CKD-EPI 2021: 56 ML/MIN/1.73M*2
ERYTHROCYTE [DISTWIDTH] IN BLOOD BY AUTOMATED COUNT: 13.2 % (ref 11.5–14.5)
GLUCOSE SERPL-MCNC: 194 MG/DL (ref 74–99)
HCT VFR BLD AUTO: 50.8 % (ref 41–52)
HGB BLD-MCNC: 16.5 G/DL (ref 13.5–17.5)
MCH RBC QN AUTO: 29.2 PG (ref 26–34)
MCHC RBC AUTO-ENTMCNC: 32.5 G/DL (ref 32–36)
MCV RBC AUTO: 90 FL (ref 80–100)
NRBC BLD-RTO: 0 /100 WBCS (ref 0–0)
PHOSPHATE SERPL-MCNC: 3.6 MG/DL (ref 2.5–4.9)
PLATELET # BLD AUTO: 211 X10*3/UL (ref 150–450)
POTASSIUM SERPL-SCNC: 4 MMOL/L (ref 3.5–5.3)
RBC # BLD AUTO: 5.66 X10*6/UL (ref 4.5–5.9)
SODIUM SERPL-SCNC: 139 MMOL/L (ref 136–145)
URATE SERPL-MCNC: 7.9 MG/DL (ref 4–7.5)
WBC # BLD AUTO: 7.8 X10*3/UL (ref 4.4–11.3)

## 2024-03-21 PROCEDURE — 84550 ASSAY OF BLOOD/URIC ACID: CPT

## 2024-03-21 PROCEDURE — 36415 COLL VENOUS BLD VENIPUNCTURE: CPT

## 2024-03-21 PROCEDURE — 82043 UR ALBUMIN QUANTITATIVE: CPT

## 2024-03-21 PROCEDURE — 99215 OFFICE O/P EST HI 40 MIN: CPT | Performed by: INTERNAL MEDICINE

## 2024-03-21 PROCEDURE — 82570 ASSAY OF URINE CREATININE: CPT

## 2024-03-21 PROCEDURE — 3079F DIAST BP 80-89 MM HG: CPT | Performed by: INTERNAL MEDICINE

## 2024-03-21 PROCEDURE — 1159F MED LIST DOCD IN RCRD: CPT | Performed by: INTERNAL MEDICINE

## 2024-03-21 PROCEDURE — 4010F ACE/ARB THERAPY RXD/TAKEN: CPT | Performed by: INTERNAL MEDICINE

## 2024-03-21 PROCEDURE — 85027 COMPLETE CBC AUTOMATED: CPT

## 2024-03-21 PROCEDURE — 1157F ADVNC CARE PLAN IN RCRD: CPT | Performed by: INTERNAL MEDICINE

## 2024-03-21 PROCEDURE — 82306 VITAMIN D 25 HYDROXY: CPT

## 2024-03-21 PROCEDURE — 80069 RENAL FUNCTION PANEL: CPT

## 2024-03-21 PROCEDURE — 3077F SYST BP >= 140 MM HG: CPT | Performed by: INTERNAL MEDICINE

## 2024-03-21 PROCEDURE — 1036F TOBACCO NON-USER: CPT | Performed by: INTERNAL MEDICINE

## 2024-03-21 NOTE — PATIENT INSTRUCTIONS
Was nice meeting you nephrology clinic today-discussed the following    # Chronic kidney disease stage III around 40%.  Possible atherosclerotic cardiovascular disease.  Most recent blood work done in December 2023 was up to 55%-repeat blood work today    # Hypertension-blood pressures elevated.  Continue Lasix 40 mg/metoprolol 200 mg/lisinopril 40 mg.  Please check medication list at home and confirm this is right    # Coronary artery disease status post CABG-continue follows cardiology    Blood work and urinalysis today-I will call with results and further recommendation  Follow-up in 12 months with another repeat blood work and urinalysis prior to visit  Danika Baer MD, MS, [facility]ANES   Clinical  - [employer]   Nephrologist - [facility] - [facility]

## 2024-03-21 NOTE — PROGRESS NOTES
"Subjective       Patel Goncalves \"Tatyana\" is a 74 y.o. male who has past medical history of coronary artery disease status post CABG, hypertension, type 2 diabetes-well-controlled, anemia, gout who is coming to see me today for qgfccf-fe-RUD management    Last office visit December 2023.  Tatyana came alone today.  No kidney disease complaints or concerns.  He is not quite sure about his medication list.  Blood pressure is running high-he follows with cardiology.  He does not check blood pressure at home.  No leg swelling or shortness of breath.  No recent blood work since December 2023-repeat today      Prior notes  Tatyana came alone today.  He is not aware of history of chronic kidney disease.  He had CABG done in 2022 with good results.  No leg swelling or shortness of breath.  He enjoys motorcycling.  No chest pain.  He recently started switching to healthier diet.  He is non-smoker.  No significant NSAID use at home.  No lower urine tract symptoms.  No foam or bubbles in the urine.  He could not leave urine sample today      Objective   /86 (BP Location: Left arm, Patient Position: Standing, BP Cuff Size: Adult)   Pulse 53   Temp 36.4 °C (97.5 °F)   Resp 16   Ht 1.727 m (5' 8\")   Wt 90.4 kg (199 lb 3.2 oz)   SpO2 97%   BMI 30.29 kg/m²   Wt Readings from Last 3 Encounters:   03/21/24 90.4 kg (199 lb 3.2 oz)   02/27/24 89.8 kg (198 lb)   12/14/23 90.1 kg (198 lb 9.6 oz)       Physical Exam    General appearance: no distress awake and alert on room air, euvolemic on exam  Eyes: non-icteric  HEENT: atrumatic head, PEERLA, moist mucosa  Skin: no apparent rash  Heart: NSR, S1, S2 normal, no murmur or gallop  Lungs: Symmetrical expansion,CTA bilat no wheezing/crackles  Abdomen: soft, nt/nd, obese  Extremities: no edema bilat  Neuro: No FND,asterixis, no focal deficits noticed        Review of Systems     Constitutional: no fever, no chills, no recent weight gain and no recent weight loss.   Eyes: no blurred " "vision and no diplopia.   ENT: no hearing loss, no earache, no sore throat, no swollen glands in the neck and no nasal discharge.   Cardiovascular: no chest pain, no palpitations and no lower extremity edema.   Respiratory: no shortness of breath, no chronic cough and no shortness of breath during exertion.   Gastrointestinal: no abdominal pain, no constipation, no heartburn, no vomiting, no bloody stools and no change in bowel movements.   Genitourinary: no dysuria and no hematuria.   Musculoskeletal: no arthralgias and no myalgias.   Skin: no rashes and no skin lesions.   Neurological: no headaches and no dizziness.   Psychiatric: no confusion, no depression and no anxiety.   Endocrine: no heat intolerance, no cold intolerance, appetite not increased, no thyroid disorder, no increased urinary frequency and no dry skin.   Hematologic/Lymphatic: does not bleed easily and does not bruise easily.   All other systems have been reviewed and are negative for complaint.         Data Review                   Lab Results   Component Value Date    URICACID 7.3 12/14/2023           Lab Results   Component Value Date    HGBA1C 8.8 (A) 02/27/2024                 No lab exists for component: \"CR\", \"PHOSPHORUS\"        Albumin/Creatine Ratio   Date Value Ref Range Status   12/14/2023 335.5 (H) <30.0 ug/mg Creat Final   10/12/2022 243.7 (H) 0.0 - 30.0 ug/mg crt Final            RFP  Recent Labs     12/14/23  1523 08/14/23  1004 08/04/23  1153 08/04/23  0637 08/03/23  2151 08/03/23  1549 08/03/23  1405 08/02/23  1449 11/30/22  1018 11/13/22  0557    138 135* 140 136 138 134* 135*   < > 139   K 3.7 4.6 5.5* 6.0* 5.7* 6.1* 6.2* 6.3*   < > 3.5    105 108* 111* 110* 109* 108* 109*   < > 103   CO2 28 25 20* 22 18* 18* 17* 17*   < > 28   BUN 18 33* 48* 52* 60* 66* 70* 72*   < > 29*   CREATININE 1.35* 1.74* 1.42* 1.51* 1.58* 1.75* 1.89* 2.14*   < > 1.02   GLUCOSE 51* 135* 325* 148* 259* 171* 283* 190*   < > 55*   CALCIUM 9.7 " 9.7 9.7 9.6 9.7 10.6* 10.4* 10.7*   < > 8.1*   PHOS 3.0  --  4.2 4.9 4.5 3.8  --  4.9  --  4.4   EGFR 55*  --   --   --   --   --   --   --   --   --    ANIONGAP 15 13 13 13 14 17 15 15   < > 12    < > = values in this interval not displayed.          Urineanalysis  Recent Labs     12/14/23  1523 08/03/23  1602 10/19/22  1426 10/12/22  1410   COLORU Straw YELLOW YELLOW YELLOW   APPEARANCEU Clear HAZY CLEAR CLEAR   SPECGRAVU 1.006 1.011 1.011 1.015   CLARISSA 6.0 5.0 5.0 5.0   PROTUR NEGATIVE NEGATIVE NEGATIVE 30(1+)*   GLUCOSEU NEGATIVE 150(2+)* >=500(3+)* >=500(3+)*   BLOODU NEGATIVE NEGATIVE NEGATIVE SMALL(1+)*   KETONESU NEGATIVE NEGATIVE NEGATIVE NEGATIVE   BILIRUBINU NEGATIVE NEGATIVE NEGATIVE NEGATIVE   NITRITEU NEGATIVE NEGATIVE NEGATIVE NEGATIVE   LEUKOCYTESU NEGATIVE NEGATIVE NEGATIVE NEGATIVE         Urine Electrolytes  Recent Labs     12/14/23  1523 08/03/23  1602 10/19/22  1426 10/12/22  1410   CREATU 29.3  --   --  73.4   PROTUR NEGATIVE NEGATIVE NEGATIVE 30(1+)*   ALBUMINUR 98.3  --   --   --    MICROALBCREA 335.5*  --   --  243.7*          Urine Micro  Recent Labs     10/12/22  1410   WBCU 5   RBCU 1   SQUAMEPIU 1   MUCUSU FEW          Iron  Recent Labs     12/14/23  1523 07/18/23  1229 11/30/22  1018   IRON 86 92 63   TIBC 320 298 329   IRONSAT 27 31 19*   FERRITIN 100 124 178              Current Outpatient Medications:     aspirin 81 mg chewable tablet, Chew once daily., Disp: , Rfl:     atorvastatin (Lipitor) 80 mg tablet, Take 1 tablet (80 mg) by mouth once daily at bedtime., Disp: 90 tablet, Rfl: 1    clopidogrel (Plavix) 75 mg tablet, Take 1 tablet (75 mg) by mouth once daily., Disp: 90 tablet, Rfl: 0    flash glucose sensor kit (FreeStyle Evy 2 Sensor) kit, Use as instructed, Disp: 2 each, Rfl: 0    FreeStyle Evy 2 Sensor kit, REPLACE SENSOR EVERY 14 DAYS, Disp: 2 each, Rfl: 0    FreeStyle Evy reader (FreeStyle Evy 2 [place]) misc, Use as instructed, Disp: 1 each, Rfl: 0    furosemide  "(Lasix) 40 mg tablet, Take 1 tablet (40 mg) by mouth once daily., Disp: 90 tablet, Rfl: 0    insulin glargine (Lantus Solostar U-100 Insulin) 100 unit/mL (3 mL) pen, INJECT 35 UNITS SUBCUTANEOUSLY AT BEDTIME, Disp: 15 mL, Rfl: 2    insulin lispro (HumaLOG) 100 unit/mL injection, INJECT 8 UNITS SUBCUTANEOUSLY 3 TIMES DAILY PLUS SLIDING SCALE. 151-200, 2 UNITS. 201-250, 4 UNITS. 251-300, 6 UNITS. 301-350, 8 UNITS. 351-400, 10 UNITS. MAX MEAL DOSE 18 UNITS. MAX DAILY 54 UNITS, Disp: 10 mL, Rfl: 2    lisinopril 40 mg tablet, Take 1 tablet (40 mg) by mouth once daily. for blood pressure, Disp: , Rfl:     metoprolol succinate XL (Toprol-XL) 200 mg 24 hr tablet, Take 1 tablet (200 mg) by mouth once daily., Disp: 90 tablet, Rfl: 0    multivitamin tablet, Take 1 tablet by mouth once daily., Disp: , Rfl:     pen needle, diabetic 31 gauge x 5/16\" needle, Use with insulin for DM II 4 times daily, Disp: 400 each, Rfl: 2    Ferrex 150 150 mg iron capsule, Take 1 capsule (150 mg) by mouth once daily., Disp: , Rfl:       Assessment and Plan     Sweeney (Lynn) JOSE Goncalves is a 73 y.o. male who has past medical history of coronary artery disease status post CABG, hypertension, type 2 diabetes-well-controlled, anemia, gout who is coming to see me today for CKD follow-up    Impression    # Chronic kidney disease -G3 B/A1-kidney function was all normal up until July 2023.  Baseline serum creatinine since then is stable 1.5-2 GFR 40-50 mill per minute troponin 23 m².  Prior spot ACR is negative.  CKD is most likely atherosclerotic cardiovascular disease.  Currently is on RAAS inhibitors-we will continue.  Within normal electrolytes and potassium-will monitor today.  No kidney image to review-will defer at this time      # BP -is elevated.  He does not check at home.  Current medication Lasix 40 mg, metoprolol 100 mg daily, lisinopril 40 mg.  Will consider increasing RAAS inhibitors plus or minus potassium binders based on repeat blood work " with follow-up        #Anemia Hb 11-11.5.  Will check iron storage      #(CKD)-MBD  -Will check calcium, phosphorus, PTH and vitamin D    # CVS/coronary artery disease status post CABG/CHF-echo 40-45% EF.  Currently on RAAS inhibitor.  Not on SGL 2 inhibitor/finerenone-will consider all with follow-up  -Continue atorvastatin 80 mg, baby aspirin and Plavix    #Diabetes-good control.  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  We will recheck.  Low threshold to start uric acid lowering therapy.  No history of gout      #Others  - No NSAIDs, no contrast as possible. If to be done- we recommend holding ACEi/ARBS/diuretics 24 hrs prior to contrast exposure and ensure appropriate hydration   - Ensure well hydration  - Limit salt in diet  - No smoking    Patient received CKD education and counselling    Reviewed blood work today-will call with results further recommendation    Follow-up in 12 months with another repeat blood work and urinalysis prior to visit      Danika Baer MD, MS, [facility]   Clinical  - [facility] [employer]   Nephrologist - [facility]

## 2024-03-22 LAB
25(OH)D3 SERPL-MCNC: 44 NG/ML (ref 30–100)
CREAT UR-MCNC: 28.1 MG/DL (ref 20–370)
MICROALBUMIN UR-MCNC: 125.5 MG/L
MICROALBUMIN/CREAT UR: 446.6 UG/MG CREAT

## 2024-03-26 ENCOUNTER — TELEPHONE (OUTPATIENT)
Dept: NEPHROLOGY | Facility: CLINIC | Age: 74
End: 2024-03-26
Payer: MEDICARE

## 2024-03-26 DIAGNOSIS — I25.810 CORONARY ARTERY DISEASE INVOLVING CORONARY BYPASS GRAFT OF NATIVE HEART, UNSPECIFIED WHETHER ANGINA PRESENT: ICD-10-CM

## 2024-03-26 RX ORDER — METOPROLOL SUCCINATE 200 MG/1
200 TABLET, EXTENDED RELEASE ORAL DAILY
Qty: 90 TABLET | Refills: 0 | Status: SHIPPED | OUTPATIENT
Start: 2024-03-26

## 2024-03-26 NOTE — TELEPHONE ENCOUNTER
----- Message from Danika Baer MD sent at 3/26/2024 11:11 AM EDT -----  Please call  Kidney function stable around 55%.  Protein leak in the urine stable.  No further recommendation  Follow-up as planned  Dr. Buffy BOYCE

## 2024-04-02 DIAGNOSIS — E11.69 TYPE 2 DIABETES MELLITUS WITH OTHER SPECIFIED COMPLICATION, UNSPECIFIED WHETHER LONG TERM INSULIN USE (MULTI): ICD-10-CM

## 2024-04-02 RX ORDER — FLASH GLUCOSE SENSOR
KIT MISCELLANEOUS
Qty: 6 EACH | Refills: 1 | Status: SHIPPED | OUTPATIENT
Start: 2024-04-02

## 2024-04-14 DIAGNOSIS — I25.810 CORONARY ARTERY DISEASE INVOLVING CORONARY BYPASS GRAFT OF NATIVE HEART, UNSPECIFIED WHETHER ANGINA PRESENT: ICD-10-CM

## 2024-04-15 RX ORDER — ATORVASTATIN CALCIUM 80 MG/1
80 TABLET, FILM COATED ORAL NIGHTLY
Qty: 90 TABLET | Refills: 0 | Status: SHIPPED | OUTPATIENT
Start: 2024-04-15

## 2024-05-24 ENCOUNTER — OFFICE VISIT (OUTPATIENT)
Dept: CARDIOLOGY | Facility: HOSPITAL | Age: 74
End: 2024-05-24
Payer: MEDICARE

## 2024-05-24 VITALS
HEART RATE: 67 BPM | WEIGHT: 200.4 LBS | BODY MASS INDEX: 30.47 KG/M2 | OXYGEN SATURATION: 95 % | SYSTOLIC BLOOD PRESSURE: 170 MMHG | DIASTOLIC BLOOD PRESSURE: 85 MMHG

## 2024-05-24 DIAGNOSIS — I10 ESSENTIAL HYPERTENSION: Primary | ICD-10-CM

## 2024-05-24 DIAGNOSIS — I73.9 PERIPHERAL ARTERIAL DISEASE (CMS-HCC): ICD-10-CM

## 2024-05-24 DIAGNOSIS — R20.0 NUMBNESS AND TINGLING OF LEFT UPPER EXTREMITY: ICD-10-CM

## 2024-05-24 DIAGNOSIS — R20.2 NUMBNESS AND TINGLING OF LEFT UPPER EXTREMITY: ICD-10-CM

## 2024-05-24 PROCEDURE — 3079F DIAST BP 80-89 MM HG: CPT | Performed by: INTERNAL MEDICINE

## 2024-05-24 PROCEDURE — 4010F ACE/ARB THERAPY RXD/TAKEN: CPT | Performed by: INTERNAL MEDICINE

## 2024-05-24 PROCEDURE — 3077F SYST BP >= 140 MM HG: CPT | Performed by: INTERNAL MEDICINE

## 2024-05-24 PROCEDURE — 1159F MED LIST DOCD IN RCRD: CPT | Performed by: INTERNAL MEDICINE

## 2024-05-24 PROCEDURE — 3060F POS MICROALBUMINURIA REV: CPT | Performed by: INTERNAL MEDICINE

## 2024-05-24 PROCEDURE — 99215 OFFICE O/P EST HI 40 MIN: CPT | Performed by: INTERNAL MEDICINE

## 2024-05-24 PROCEDURE — 1157F ADVNC CARE PLAN IN RCRD: CPT | Performed by: INTERNAL MEDICINE

## 2024-05-24 RX ORDER — AMLODIPINE BESYLATE 5 MG/1
5 TABLET ORAL DAILY
Qty: 90 TABLET | Refills: 3 | Status: SHIPPED | OUTPATIENT
Start: 2024-05-24 | End: 2025-05-24

## 2024-05-24 ASSESSMENT — ENCOUNTER SYMPTOMS: DEPRESSION: 0

## 2024-05-24 NOTE — PROGRESS NOTES
Primary Care Physician: Mike Moran MD   Date of Visit: 05/24/2024  9:00 AM EDT  Type of Visit: Follow-up    Chief Complaint:  Chief Complaint   Patient presents with    Follow-up     C/o unchanging numbness in L arm and L hand/finger.  Daughter states patient's BP readings at home have also been elevated.        HPI  Patel Goncalves 74 y.o. male who presents for follow-up.    Previous non-ST elevation myocardial infarction with multivessel disease requiring four-vessel coronary artery bypass graft surgery (LIMA-LAD, VG-OM1-OM2, VG-PLB).    He denies any angina, shortness of breath, paroxysmal nocturnal dyspnea, orthopnea, peripheral edema, near-syncope, syncope, or palpitations.  Does report left hand numbness that has been present since his operation.    Additionally, reports that blood pressures at home have been elevated.    Review of Systems   12 point review of systems was obtained in detail and is negative other than that noted above or below.     Past Medical/Surgical History:  Patel has a past medical history of Bitten or stung by nonvenomous insect and other nonvenomous arthropods, initial encounter (10/21/2019), Cutaneous abscess of left upper limb (06/03/2015), Disorder of the skin and subcutaneous tissue, unspecified (07/30/2020), Essential hypertension (05/19/2010), Personal history of other diseases of urinary system (01/28/2019), Personal history of other specified conditions (04/16/2019), Prepatellar bursitis, unspecified knee (10/30/2018), and Unspecified renal colic (01/28/2019).    Patel has a past surgical history that includes Cardiac surgery.    Social/Family History:  Patel reports that he has never smoked. He has never used smokeless tobacco. He reports current alcohol use. He reports that he does not currently use drugs.    Patel's family history includes Aortic aneurysm in his brother; Diabetes in his maternal grandmother.    Allergies:  Allergies   Allergen Reactions    Bee  "Venom Protein (Honey Bee) Hives     Hives from hornets    Naproxen Sodium Itching       Medications:  Current Outpatient Medications on File Prior to Visit   Medication Sig    aspirin 81 mg chewable tablet Chew once daily.    atorvastatin (Lipitor) 80 mg tablet Take 1 tablet (80 mg) by mouth once daily at bedtime.    clopidogrel (Plavix) 75 mg tablet Take 1 tablet (75 mg) by mouth once daily.    Ferrex 150 150 mg iron capsule Take 1 capsule (150 mg) by mouth once daily.    flash glucose sensor kit (FreeStyle Evy 2 Sensor) kit Use as instructed    FreeStyle Evy 2 Sensor kit REPLACE SENSOR EVERY 14 DAYS    FreeStyle Evy reader (FreeStyle Evy 2 [place]) misc Use as instructed    furosemide (Lasix) 40 mg tablet Take 1 tablet (40 mg) by mouth once daily.    insulin glargine (Lantus Solostar U-100 Insulin) 100 unit/mL (3 mL) pen INJECT 35 UNITS SUBCUTANEOUSLY AT BEDTIME    insulin lispro (HumaLOG) 100 unit/mL injection INJECT 8 UNITS SUBCUTANEOUSLY 3 TIMES DAILY PLUS SLIDING SCALE. 151-200, 2 UNITS. 201-250, 4 UNITS. 251-300, 6 UNITS. 301-350, 8 UNITS. 351-400, 10 UNITS. MAX MEAL DOSE 18 UNITS. MAX DAILY 54 UNITS    lisinopril 40 mg tablet Take 1 tablet (40 mg) by mouth once daily. for blood pressure    metoprolol succinate XL (Toprol-XL) 200 mg 24 hr tablet Take 1 tablet (200 mg) by mouth once daily.    multivitamin tablet Take 1 tablet by mouth once daily.    pen needle, diabetic 31 gauge x 5/16\" needle Use with insulin for DM II 4 times daily     No current facility-administered medications on file prior to visit.       Objective:   Vitals:    05/24/24 0905   BP: 170/85   Pulse: 67   SpO2: 95%       S1, S2 normal, regular rate and rhythm, no murmurs, rubs, or gallops  Clear to auscultation bilaterally    Labs and Imaging:      Latest Ref Rng & Units 8/3/2023     3:49 PM 8/3/2023     9:51 PM 8/4/2023     6:37 AM 8/4/2023    11:53 AM 8/14/2023    10:04 AM 12/14/2023     3:23 PM 3/21/2024     3:01 PM   Electrolytes "   Na 136 - 145 mmol/L 138  136  140  135  138  143  139    K 3.5 - 5.3 mmol/L 6.1  5.7  6.0  5.5  4.6  3.7  4.0    Cl 98 - 107 mmol/L 109  110  111  108  105  104  99    CO2 21 - 32 mmol/L 18  18  22  20  25  28  30    Cr 0.50 - 1.30 mg/dL 1.75  1.58  1.51  1.42  1.74  1.35  1.33    Ca 8.6 - 10.3 mg/dL 10.6  9.7  9.6  9.7  9.7  9.7  9.9    Phos 2.5 - 4.9 mg/dL 3.8  4.5  4.9  4.2   3.0  3.6          Latest Ref Rng & Units 11/13/2022     5:57 AM 11/30/2022    10:18 AM 7/18/2023    12:29 PM 8/3/2023     2:05 PM 8/4/2023     6:37 AM 12/14/2023     3:23 PM 3/21/2024     3:01 PM   CBC   Hb 13.5 - 17.5 g/dL 9.4  12.6  12.0  12.8  11.5  14.5  16.5    Hct 41.0 - 52.0 % 29.5  41.3  38.6  38.5  35.9  45.4  50.8    MCV 80 - 100 fL 89  91  97  92  93  89  90    RDW 11.5 - 14.5 % 13.6  13.7  13.5  12.3  12.5  13.3  13.2          Latest Ref Rng & Units 10/18/2022    12:30 AM 10/19/2022     6:15 AM 11/30/2022    10:18 AM 7/18/2023    12:29 PM 8/2/2023     2:49 PM 8/3/2023     2:05 PM 8/14/2023    10:04 AM   Lipids   Chol 0 - 199 mg/dL 117    86       HDL mg/dL 33.7    36.2       LDL 0 - 99 mg/dL 40    19       Trig 0 - 149 mg/dL 215    155       ALT 10 - 52 U/L  13  18  25  25  23  32    AST 9 - 39 U/L  14  13  18  20  14  24          Latest Ref Rng & Units 10/20/2022     5:30 AM   Thyroid   TSH 0.44 - 3.98 mIU/L 1.81           No data to display                 Lab Results   Component Value Date    HGBA1C 8.8 (A) 02/27/2024    HGBA1C 6.9 (A) 08/03/2023    HGBA1C 6.8 (A) 07/18/2023        The ASCVD Risk score (Maryan GUILLEN, et al., 2019) failed to calculate for the following reasons:    The patient has a prior MI or stroke diagnosis     Echocardiogram: No results found for this or any previous visit.     Echocardiogram:   PHYSICIAN INTERPRETATION:  Left Ventricle: The left ventricular systolic function is mildly to moderately decreased, with an estimated ejection fraction of 40-45%. There are no regional wall motion abnormalities. The  left ventricular cavity size is normal. Abnormal (paradoxical) septal motion consistent with post-operative status. Spectral Doppler shows an abnormal pattern of left ventricular diastolic filling.  Left Atrium: The left atrium is normal in size.  Right Ventricle: The right ventricle is normal in size. There is low normal right ventricular systolic function.  Right Atrium: The right atrium is normal in size.  Aortic Valve: The aortic valve appears structurally normal. There is evidence of mild aortic valve stenosis.  There is no evidence of aortic valve regurgitation. The peak instantaneous gradient of the aortic valve is 12.4 mmHg. The mean gradient of the aortic valve is 7.0 mmHg.  Mitral Valve: The mitral valve is normal in structure. There is mild mitral valve regurgitation.  Tricuspid Valve: The tricuspid valve is structurally normal. There is mild tricuspid regurgitation.  Pulmonic Valve: The pulmonic valve is not well visualized. The pulmonic valve regurgitation was not well visualized.  Pericardium: There is no pericardial effusion noted.  Aorta: The aortic root is normal.  Pulmonary Artery: The tricuspid regurgitant velocity is 2.57 m/s, and with an estimated right atrial pressure of 10 mmHg, the estimated pulmonary artery pressure is mildly elevated with the RVSP at 36.4 mmHg.  Systemic Veins: The inferior vena cava appears to be of normal size.      CONCLUSIONS:  1. Left ventricular systolic function is mildly to moderately decreased with a 40-45% estimated ejection fraction.  2. Abnormal septal motion consistent with post-operative status.  3. Spectral Doppler shows an abnormal pattern of left ventricular diastolic filling.  4. There is low normal right ventricular systolic function.  5. Mild aortic valve stenosis.  6. There is mild mitral and tricuspid regurgitation.  7. The estimated pulmonary artery pressure is mildly elevated with the RVSP at 36.4 mmHg.    Stress Testing: No results found for this or  any previous visit from the past 1825 days.    Cardiac Catheterization:     Stress test performed: No. CTA performed: No. [facility] accessed: No. LVEF Assessed: Yes. LVEF = 40-45%.    Coronary Angiography:  The coronary circulation is right dominant.    Left Main Coronary Artery:  The left main coronary artery is a normal caliber vessel. The left main arises normally from the left coronary sinus of Valsalva and bifurcates into the LAD and circumflex coronary arteries. The left main coronary artery showed severe obstruction. The entire left main coronary artery showed 50% stenosis.    Left Anterior Descending Coronary Artery Distribution:  The left anterior descending coronary artery is a normal caliber vessel. The LAD arises normally from the left main coronary artery. The LAD demonstrated severe proximal obstruction and total occlusion originating at the mid segment. The proximal left anterior descending coronary artery showed 70% stenosis. This lesion was eccentric. The 1st diagonal branch is a normal caliber vessel. The 1st diagonal branch showed severe ostial obstruction. The ostial 1st diagonal branch revealed 90% stenosis.    Circumflex Coronary Artery Distribution:  The circumflex coronary artery is a normal caliber vessel. The circumflex arises normally from the left main coronary artery and terminates in the AV groove. The circumflex revealed severe proximal obstruction and chronic occlusion originating at the mid segment. The proximal circumflex coronary artery showed 70% stenosis. This lesion was eccentric. The 1st obtuse marginal branch is a normal caliber vessel. The 1st obtuse marginal branch showed severe ostial to proximal obstruction.    Right Coronary Artery Distribution:    The right coronary artery is a normal caliber vessel. The RCA arises normally from the right sinus of Valsalva. The RCA showed no significant disease or stenosis greater than 30%.  The right posterolateral branch is a normal  caliber vessel. The right posterior descending artery showed severe ostial to proximal obstruction. This lesion was eccentric and tubular.    Coronary Interventions:    Coronary Lesion Summary:  Vessel         Stenosis   Vessel Segment  Left Main    50% stenosis     entire  LAD          70% stenosis    proximal  1st Diagonal 90% stenosis     ostial  Circumflex   70% stenosis    proximal  RPDA         80% stenosis    ____________________________________________________________________________________  CONCLUSIONS:  1. Multivessel disease as noted above.  2. Findings conveyed to Eugenio Zazueta and Dari Vizcaino.  3. GDMT for ACS and HF.    ____________________________________________________________________________________  Cardiac Scoring: No results found for this or any previous visit from the past 1825 days.    AAA : No results found for this or any previous visit from the past 1825 days.    OTHER: No results found for this or any previous visit from the past 1825 days.        Assessment/Plan:   1. Essential hypertension  amLODIPine (Norvasc) 5 mg tablet      2. Numbness and tingling of left upper extremity  Vascular US upper extremity arterial duplex left      3. Peripheral arterial disease (CMS-HCC)  Vascular US upper extremity arterial duplex left           Patel Goncalves 74 y.o. male who presents for follow-up:    1.  Uncontrolled hypertension  2.  Left upper extremity numbness and tingling  3.  Chronic ischemic heart disease  4.  Status post four-vessel coronary artery bypass graft surgery on 11/28/2022 with Dr. Ewelnia Trujillo (MILLER-LAD, VG-OM1-OM2, VG-PLB)  5.  Mild left ventricular systolic dysfunction (LVEF 45%)  6.  Ischemic cardiomyopathy    Start amlodipine 5 mg daily.  Check left upper extremity arterial duplex.    Guideline directed medical therapy for heart failure: Metoprolol succinate 20 mg daily, lisinopril 40 mg daily, he has stopped spironolactone.  Additionally, takes furosemide as needed; has not  had to take any recently.    Continue guideline directed medical therapy for stable ischemic heart disease: Aspirin 81 mg daily, clopidogrel 75 mg daily, atorvastatin 80 mg nightly.  Continue metoprolol succinate 200 mg daily.    Will have virtual visit in 1 month to see how his blood pressures are doing.     ____________________________________________________________  Jose Antonio Maynard MD

## 2024-06-11 ENCOUNTER — HOSPITAL ENCOUNTER (OUTPATIENT)
Dept: RADIOLOGY | Facility: HOSPITAL | Age: 74
Discharge: HOME | End: 2024-06-11
Payer: MEDICARE

## 2024-06-11 DIAGNOSIS — R20.0 NUMBNESS AND TINGLING OF LEFT UPPER EXTREMITY: ICD-10-CM

## 2024-06-11 DIAGNOSIS — R20.2 NUMBNESS AND TINGLING OF LEFT UPPER EXTREMITY: ICD-10-CM

## 2024-06-11 DIAGNOSIS — I73.9 PERIPHERAL ARTERIAL DISEASE (CMS-HCC): ICD-10-CM

## 2024-06-11 PROCEDURE — 93930 UPPER EXTREMITY STUDY: CPT

## 2024-06-13 PROCEDURE — 93930 UPPER EXTREMITY STUDY: CPT | Performed by: SURGERY

## 2024-06-13 PROCEDURE — 93922 UPR/L XTREMITY ART 2 LEVELS: CPT | Performed by: SURGERY

## 2024-06-17 DIAGNOSIS — Z79.4 TYPE 2 DIABETES MELLITUS WITH OTHER SPECIFIED COMPLICATION, WITH LONG-TERM CURRENT USE OF INSULIN (MULTI): ICD-10-CM

## 2024-06-17 DIAGNOSIS — E11.69 TYPE 2 DIABETES MELLITUS WITH OTHER SPECIFIED COMPLICATION, WITH LONG-TERM CURRENT USE OF INSULIN (MULTI): ICD-10-CM

## 2024-06-17 RX ORDER — PEN NEEDLE, DIABETIC 30 GX3/16"
NEEDLE, DISPOSABLE MISCELLANEOUS
Qty: 400 EACH | Refills: 0 | Status: SHIPPED | OUTPATIENT
Start: 2024-06-17

## 2024-06-19 DIAGNOSIS — I25.810 CORONARY ARTERY DISEASE INVOLVING CORONARY BYPASS GRAFT OF NATIVE HEART, UNSPECIFIED WHETHER ANGINA PRESENT: ICD-10-CM

## 2024-06-19 RX ORDER — METOPROLOL SUCCINATE 200 MG/1
200 TABLET, EXTENDED RELEASE ORAL DAILY
Qty: 90 TABLET | Refills: 0 | Status: SHIPPED | OUTPATIENT
Start: 2024-06-19

## 2024-06-20 DIAGNOSIS — Z00.00 HEALTH CARE MAINTENANCE: ICD-10-CM

## 2024-06-20 DIAGNOSIS — I25.810 CORONARY ARTERY DISEASE INVOLVING CORONARY BYPASS GRAFT OF NATIVE HEART, UNSPECIFIED WHETHER ANGINA PRESENT: ICD-10-CM

## 2024-06-20 RX ORDER — FUROSEMIDE 40 MG/1
40 TABLET ORAL DAILY
Qty: 90 TABLET | Refills: 0 | Status: SHIPPED | OUTPATIENT
Start: 2024-06-20 | End: 2024-09-18

## 2024-06-20 RX ORDER — CLOPIDOGREL BISULFATE 75 MG/1
75 TABLET ORAL DAILY
Qty: 90 TABLET | Refills: 0 | Status: SHIPPED | OUTPATIENT
Start: 2024-06-20

## 2024-06-27 ENCOUNTER — APPOINTMENT (OUTPATIENT)
Dept: PRIMARY CARE | Facility: CLINIC | Age: 74
End: 2024-06-27
Payer: MEDICARE

## 2024-06-27 VITALS
BODY MASS INDEX: 30.86 KG/M2 | HEIGHT: 68 IN | OXYGEN SATURATION: 95 % | HEART RATE: 55 BPM | WEIGHT: 203.6 LBS | DIASTOLIC BLOOD PRESSURE: 77 MMHG | SYSTOLIC BLOOD PRESSURE: 148 MMHG | RESPIRATION RATE: 18 BRPM

## 2024-06-27 DIAGNOSIS — I48.91 ATRIAL FIBRILLATION, UNSPECIFIED TYPE (MULTI): ICD-10-CM

## 2024-06-27 DIAGNOSIS — E11.69 TYPE 2 DIABETES MELLITUS WITH OTHER SPECIFIED COMPLICATION, WITH LONG-TERM CURRENT USE OF INSULIN (MULTI): ICD-10-CM

## 2024-06-27 DIAGNOSIS — I25.119 CORONARY ARTERY DISEASE INVOLVING NATIVE CORONARY ARTERY OF NATIVE HEART WITH ANGINA PECTORIS (CMS-HCC): ICD-10-CM

## 2024-06-27 DIAGNOSIS — N18.9 CHRONIC KIDNEY DISEASE, UNSPECIFIED CKD STAGE: ICD-10-CM

## 2024-06-27 DIAGNOSIS — E11.9 TYPE 2 DIABETES MELLITUS WITHOUT COMPLICATION, WITH LONG-TERM CURRENT USE OF INSULIN (MULTI): ICD-10-CM

## 2024-06-27 DIAGNOSIS — I10 UNCONTROLLED HYPERTENSION: ICD-10-CM

## 2024-06-27 DIAGNOSIS — Z79.4 TYPE 2 DIABETES MELLITUS WITH OTHER SPECIFIED COMPLICATION, WITH LONG-TERM CURRENT USE OF INSULIN (MULTI): ICD-10-CM

## 2024-06-27 DIAGNOSIS — Z79.4 TYPE 2 DIABETES MELLITUS WITHOUT COMPLICATION, WITH LONG-TERM CURRENT USE OF INSULIN (MULTI): ICD-10-CM

## 2024-06-27 PROBLEM — M10.9 ARTICULAR GOUT: Status: ACTIVE | Noted: 2023-04-13

## 2024-06-27 PROBLEM — N35.919 URETHRAL STRICTURE: Status: RESOLVED | Noted: 2024-06-27 | Resolved: 2024-06-27

## 2024-06-27 PROBLEM — N05.9: Status: RESOLVED | Noted: 2023-07-13 | Resolved: 2024-06-27

## 2024-06-27 PROBLEM — R53.83 FATIGUE: Status: RESOLVED | Noted: 2024-06-27 | Resolved: 2024-06-27

## 2024-06-27 LAB — POC HEMOGLOBIN A1C: 9.2 % (ref 4.2–6.5)

## 2024-06-27 PROCEDURE — 99214 OFFICE O/P EST MOD 30 MIN: CPT | Performed by: INTERNAL MEDICINE

## 2024-06-27 PROCEDURE — 1160F RVW MEDS BY RX/DR IN RCRD: CPT | Performed by: INTERNAL MEDICINE

## 2024-06-27 PROCEDURE — 1159F MED LIST DOCD IN RCRD: CPT | Performed by: INTERNAL MEDICINE

## 2024-06-27 PROCEDURE — 4010F ACE/ARB THERAPY RXD/TAKEN: CPT | Performed by: INTERNAL MEDICINE

## 2024-06-27 PROCEDURE — 3078F DIAST BP <80 MM HG: CPT | Performed by: INTERNAL MEDICINE

## 2024-06-27 PROCEDURE — 1036F TOBACCO NON-USER: CPT | Performed by: INTERNAL MEDICINE

## 2024-06-27 PROCEDURE — 1157F ADVNC CARE PLAN IN RCRD: CPT | Performed by: INTERNAL MEDICINE

## 2024-06-27 PROCEDURE — 3077F SYST BP >= 140 MM HG: CPT | Performed by: INTERNAL MEDICINE

## 2024-06-27 PROCEDURE — 83036 HEMOGLOBIN GLYCOSYLATED A1C: CPT | Performed by: INTERNAL MEDICINE

## 2024-06-27 PROCEDURE — 1126F AMNT PAIN NOTED NONE PRSNT: CPT | Performed by: INTERNAL MEDICINE

## 2024-06-27 PROCEDURE — 3060F POS MICROALBUMINURIA REV: CPT | Performed by: INTERNAL MEDICINE

## 2024-06-27 RX ORDER — AMLODIPINE BESYLATE 2.5 MG/1
2.5 TABLET ORAL DAILY
Qty: 30 TABLET | Refills: 5 | Status: SHIPPED | OUTPATIENT
Start: 2024-06-27 | End: 2024-06-28 | Stop reason: ALTCHOICE

## 2024-06-27 RX ORDER — DAPAGLIFLOZIN 10 MG/1
10 TABLET, FILM COATED ORAL DAILY
Qty: 30 TABLET | Refills: 11 | Status: SHIPPED | OUTPATIENT
Start: 2024-06-27 | End: 2025-06-27

## 2024-06-27 ASSESSMENT — PATIENT HEALTH QUESTIONNAIRE - PHQ9
SUM OF ALL RESPONSES TO PHQ9 QUESTIONS 1 AND 2: 0
2. FEELING DOWN, DEPRESSED OR HOPELESS: NOT AT ALL
1. LITTLE INTEREST OR PLEASURE IN DOING THINGS: NOT AT ALL

## 2024-06-27 ASSESSMENT — ENCOUNTER SYMPTOMS
RESPIRATORY NEGATIVE: 1
NUMBNESS: 1
MUSCULOSKELETAL NEGATIVE: 1
CONSTITUTIONAL NEGATIVE: 1
PSYCHIATRIC NEGATIVE: 1
CARDIOVASCULAR NEGATIVE: 1
GASTROINTESTINAL NEGATIVE: 1

## 2024-06-27 ASSESSMENT — PAIN SCALES - GENERAL: PAINLEVEL: 0-NO PAIN

## 2024-06-27 NOTE — ASSESSMENT & PLAN NOTE
BP has been consistently elevated, per most recent cardiology notes   BP is 148/77 today  Discussed adding medication with patient   We will increase Amlodipine to 7.5mg daily, prescription sent in for 2.5mg tablets  He has follow up with cardiology tomorrow, advised patient to inform of increase  We will also start patient on Farxiga 10mg daily , prescription sent in   Advised patient to call office if Farxiga is not affordable for him

## 2024-06-27 NOTE — ASSESSMENT & PLAN NOTE
A1c is worse today, 9.2, was 8.8 in February  Will increase Lantus insulin to 40units at bedtime  Add Farxiga 10mg daily, script sent in

## 2024-06-27 NOTE — PATIENT INSTRUCTIONS
It was great to see you in the office today! Here is what we discussed at your visit today:  Please continue to take your current medications   Increase Lantus to 40 units daily at night  Increase Amlodipine to 7.5mg  daily, will send in prescription for 2.5mg tablets  We will add Farxiga 10mg daily, prescription sent in  Follow up in four months

## 2024-06-27 NOTE — PROGRESS NOTES
"Patient ID: He states that his left hand is numb, his BP remains elevated, has follow up with Dr. Maynard tomorrow. He states his whole left hand is numb. He states his weight has been stable, usually within a couple of pounds every day. He states he got stung by a wasp about one month ago, and he had a lot of pain in right arm, thinks he had a slight gout flare up in right elbow as well. He states that he has been taking Lantus insulin 35 units every night.     HEALTH MAINTENANCE: Follow Up  Last Office Visit: 2/27/24  Last Labs: 3/21/24, hgba1c on 2/27/24 was 8.8  PSA Screening (starting at age 55 till 69):  Colonoscopy (45-75 or age 40 with 1st degree relative dx colon ca): DUE  DEXA (65+, q 2 years women and 70+ men):   ECHO 3/3/2023 showed LVSF 40-45%, septal motion consistent with postOp status, abnormal pattern of LVDF, low normal RVSF, mild AV stenosis. Mild MR, TR. PAP mildly elevated.   CT Lung done 10/19/2022 - moderate atherosclerotic calcifications, left kidney stone, borderline ascending aortic aneurysm measuring 4.0cm.   - last saw Dr. Baer on 3/21/24, no changes made  - was seen by Cardiology(Dr. Bhakta) on 05/24/24, BP was elevated, per his note: \"Start amlodipine 5 mg daily.  Check left upper extremity arterial duplex. Guideline directed medical therapy for heart failure: Metoprolol succinate 20 mg daily, lisinopril 40 mg daily, he has stopped spironolactone.  Additionally, takes furosemide as needed; has not had to take any recently. Continue guideline directed medical therapy for stable ischemic heart disease: Aspirin 81 mg daily, clopidogrel 75 mg daily, atorvastatin 80 mg nightly.  Continue metoprolol succinate 200 mg daily. Will have virtual visit in 1 month to see how his blood pressures are doing.\"    HPI Patel Goncalves \"Tatyana\" is a 74 y.o. male with PMH remarkable for CAD s/p CABG x4 on 11/28/2022 with Dr Ewelina Trujillo, DM2, Gout, HTN, CKD  who presents to the office today for follow up. " "    Social History     Tobacco Use    Smoking status: Never    Smokeless tobacco: Never   Vaping Use    Vaping status: Never Used   Substance Use Topics    Alcohol use: Yes     Comment: Occasional    Drug use: Not Currently     Review of Systems   Constitutional: Negative.    HENT: Negative.     Respiratory: Negative.     Cardiovascular: Negative.    Gastrointestinal: Negative.    Genitourinary: Negative.    Musculoskeletal: Negative.    Skin: Negative.    Neurological:  Positive for numbness.   Psychiatric/Behavioral: Negative.         Visit Vitals  Vitals:    06/27/24 0922   BP: 148/77   Pulse: 55   Resp: 18   SpO2: 95%   Weight: 92.4 kg (203 lb 9.6 oz)   Height: 1.727 m (5' 8\")      Smoking Status Never     Physical Exam  Vitals reviewed.   Constitutional:       Appearance: Normal appearance.   HENT:      Head: Normocephalic and atraumatic.   Cardiovascular:      Rate and Rhythm: Normal rate and regular rhythm.      Pulses: Normal pulses.      Heart sounds: Normal heart sounds.   Pulmonary:      Effort: Pulmonary effort is normal.      Breath sounds: Normal breath sounds.   Abdominal:      General: Bowel sounds are normal.      Palpations: Abdomen is soft.   Musculoskeletal:         General: Normal range of motion.   Skin:     General: Skin is warm and dry.   Neurological:      General: No focal deficit present.      Mental Status: He is alert and oriented to person, place, and time.   Psychiatric:         Mood and Affect: Mood normal.         Behavior: Behavior normal.       Current Outpatient Medications   Medication Instructions    amLODIPine (NORVASC) 5 mg, oral, Daily    aspirin 81 mg chewable tablet oral, Daily    atorvastatin (LIPITOR) 80 mg, oral, Nightly    clopidogrel (PLAVIX) 75 mg, oral, Daily    Ferrex 150 150 mg iron capsule 1 capsule, oral, Daily    flash glucose sensor kit (FreeStyle Evy 2 Sensor) kit Use as instructed    FreeStyle Evy 2 Sensor kit REPLACE SENSOR EVERY 14 DAYS    FreeStyle " "Evy reader (FreeStyle Evy 2 [place]) misc Use as instructed    furosemide (LASIX) 40 mg, oral, Daily    insulin glargine (Lantus Solostar U-100 Insulin) 100 unit/mL (3 mL) pen INJECT 35 UNITS SUBCUTANEOUSLY AT BEDTIME    insulin lispro (HumaLOG) 100 unit/mL injection INJECT 8 UNITS SUBCUTANEOUSLY 3 TIMES DAILY PLUS SLIDING SCALE. 151-200, 2 UNITS. 201-250, 4 UNITS. 251-300, 6 UNITS. 301-350, 8 UNITS. 351-400, 10 UNITS. MAX MEAL DOSE 18 UNITS. MAX DAILY 54 UNITS    lisinopril 40 mg, oral, Daily, for blood pressure    metoprolol succinate XL (TOPROL-XL) 200 mg, oral, Daily    multivitamin tablet 1 tablet, oral, Daily    pen needle, diabetic 31 gauge x 5/16\" needle USE 1 NEW PEN NEEDLE 4 TIMES DAILY        Lab Results   Component Value Date    WBC 7.8 03/21/2024    HGB 16.5 03/21/2024    HCT 50.8 03/21/2024     03/21/2024    CHOL 86 07/18/2023    TRIG 155 (H) 07/18/2023    HDL 36.2 (A) 07/18/2023    ALT 32 08/14/2023    AST 24 08/14/2023     03/21/2024    K 4.0 03/21/2024    CL 99 03/21/2024    CREATININE 1.33 (H) 03/21/2024    BUN 24 (H) 03/21/2024    CO2 30 03/21/2024    TSH 1.81 10/20/2022    INR 1.2 (H) 10/18/2022    HGBA1C 8.8 (A) 02/27/2024     Problem List Items Addressed This Visit             ICD-10-CM    Diabetes mellitus, type II (Multi) E11.9     A1c is worse today, 9.2, was 8.8 in February  Will increase Lantus insulin to 40units at bedtime  Add Farxiga 10mg daily, script sent in         Relevant Medications    dapagliflozin propanediol (Farxiga) 10 mg    Other Relevant Orders    POCT glycosylated hemoglobin (Hb A1C) manually resulted (Completed)    Uncontrolled hypertension I10     BP has been consistently elevated, per most recent cardiology notes   BP is 148/77 today  Discussed adding medication with patient   We will increase Amlodipine to 7.5mg daily, prescription sent in for 2.5mg tablets  He has follow up with cardiology tomorrow, advised patient to inform of increase  We will also " start patient on Farxiga 10mg daily , prescription sent in   Advised patient to call office if Farxiga is not affordable for him         Relevant Medications    amLODIPine (Norvasc) 2.5 mg tablet    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 pectoris (CMS-HCC) I25.119     Stable   Continue with Plavix, ASA  Increase Amlodipine to 7.5mg daily for better BP control         Relevant Medications    amLODIPine (Norvasc) 2.5 mg tablet    CKD (chronic kidney disease)  - continue to monitor kidney function  - continue to follow up with Dr. Baer, had apt with her on 3/21/24, no changes made N18.9   ------------------  Written by Maxine De Leon LPN, acting as a scribe for Dr. Hidalgo. This note accurately reflects the work and decisions made by Dr. Hidalgo.     I, Dr. Hidalgo, attest all medical record entries made by the scribe were under my direction and were personally dictated by me. I have reviewed the chart and agree that the record accurately reflects my performance of the history, physical exam, and assessment and plan.

## 2024-06-28 ENCOUNTER — TELEMEDICINE (OUTPATIENT)
Dept: CARDIOLOGY | Facility: HOSPITAL | Age: 74
End: 2024-06-28
Payer: MEDICARE

## 2024-06-28 DIAGNOSIS — I10 ESSENTIAL HYPERTENSION: ICD-10-CM

## 2024-06-28 PROCEDURE — 4010F ACE/ARB THERAPY RXD/TAKEN: CPT | Performed by: INTERNAL MEDICINE

## 2024-06-28 PROCEDURE — 99213 OFFICE O/P EST LOW 20 MIN: CPT | Performed by: INTERNAL MEDICINE

## 2024-06-28 PROCEDURE — 1157F ADVNC CARE PLAN IN RCRD: CPT | Performed by: INTERNAL MEDICINE

## 2024-06-28 PROCEDURE — 1159F MED LIST DOCD IN RCRD: CPT | Performed by: INTERNAL MEDICINE

## 2024-06-28 PROCEDURE — 3060F POS MICROALBUMINURIA REV: CPT | Performed by: INTERNAL MEDICINE

## 2024-06-28 PROCEDURE — 1036F TOBACCO NON-USER: CPT | Performed by: INTERNAL MEDICINE

## 2024-06-28 RX ORDER — AMLODIPINE BESYLATE 10 MG/1
10 TABLET ORAL DAILY
Qty: 90 TABLET | Refills: 3 | Status: SHIPPED | OUTPATIENT
Start: 2024-06-28 | End: 2025-06-28

## 2024-06-28 NOTE — PROGRESS NOTES
Primary Care Physician: Mike Moran MD   Date of Visit: 06/28/2024  8:30 AM EDT  Type of Visit: Follow-up    Chief Complaint:  No chief complaint on file.       HPI  Patel Goncalves 74 y.o. male who presents for follow-up.    Previous non-ST elevation myocardial infarction with multivessel disease requiring four-vessel coronary artery bypass graft surgery (LIMA-LAD, VG-OM1-OM2, VG-PLB).    He denies any angina, shortness of breath, paroxysmal nocturnal dyspnea, orthopnea, peripheral edema, near-syncope, syncope, or palpitations.    Blood pressures have been elevated on his blood pressure diary.    Review of Systems   12 point review of systems was obtained in detail and is negative other than that noted above or below.     Past Medical/Surgical History:  Patel has a past medical history of Bitten or stung by nonvenomous insect and other nonvenomous arthropods, initial encounter (10/21/2019), Cutaneous abscess of left upper limb (06/03/2015), Disorder of the skin and subcutaneous tissue, unspecified (07/30/2020), Essential hypertension (05/19/2010), Fatigue (06/27/2024), Personal history of other diseases of urinary system (01/28/2019), Personal history of other specified conditions (04/16/2019), Prepatellar bursitis, unspecified knee (10/30/2018), Renal glomerular disease (07/13/2023), Unspecified renal colic (01/28/2019), and Urethral stricture (06/27/2024).    Patel has a past surgical history that includes Cardiac surgery.    Social/Family History:  Patel reports that he has never smoked. He has never used smokeless tobacco. He reports current alcohol use. He reports that he does not currently use drugs.    Patel's family history includes Aortic aneurysm in his brother; Diabetes in his maternal grandmother.    Allergies:  Allergies   Allergen Reactions    Bee Venom Protein (Honey Bee) Hives     Hives from hornets    Naproxen Sodium Itching       Medications:  Current Outpatient Medications on File  "Prior to Visit   Medication Sig    aspirin 81 mg chewable tablet Chew once daily.    atorvastatin (Lipitor) 80 mg tablet Take 1 tablet (80 mg) by mouth once daily at bedtime.    clopidogrel (Plavix) 75 mg tablet Take 1 tablet (75 mg) by mouth once daily.    dapagliflozin propanediol (Farxiga) 10 mg Take 1 tablet (10 mg) by mouth once daily.    flash glucose sensor kit (FreeStyle Evy 2 Sensor) kit Use as instructed    FreeStyle Evy 2 Sensor kit REPLACE SENSOR EVERY 14 DAYS    FreeStyle Evy reader (FreeStyle Evy 2 Steele) misc Use as instructed    furosemide (Lasix) 40 mg tablet Take 1 tablet (40 mg) by mouth once daily.    insulin glargine (Lantus Solostar U-100 Insulin) 100 unit/mL (3 mL) pen INJECT 35 UNITS SUBCUTANEOUSLY AT BEDTIME    insulin lispro (HumaLOG) 100 unit/mL injection INJECT 8 UNITS SUBCUTANEOUSLY 3 TIMES DAILY PLUS SLIDING SCALE. 151-200, 2 UNITS. 201-250, 4 UNITS. 251-300, 6 UNITS. 301-350, 8 UNITS. 351-400, 10 UNITS. MAX MEAL DOSE 18 UNITS. MAX DAILY 54 UNITS    lisinopril 40 mg tablet Take 1 tablet (40 mg) by mouth once daily. for blood pressure    metoprolol succinate XL (Toprol-XL) 200 mg 24 hr tablet Take 1 tablet by mouth once daily    multivitamin tablet Take 1 tablet by mouth once daily.    pen needle, diabetic 31 gauge x 5/16\" needle USE 1 NEW PEN NEEDLE 4 TIMES DAILY    [DISCONTINUED] amLODIPine (Norvasc) 2.5 mg tablet Take 1 tablet (2.5 mg) by mouth once daily.    [DISCONTINUED] amLODIPine (Norvasc) 5 mg tablet Take 1 tablet (5 mg) by mouth once daily.    Ferrex 150 150 mg iron capsule Take 1 capsule (150 mg) by mouth once daily.     No current facility-administered medications on file prior to visit.       Objective:   There were no vitals filed for this visit.    Virtual visit    Labs and Imaging:      Latest Ref Rng & Units 8/3/2023     3:49 PM 8/3/2023     9:51 PM 8/4/2023     6:37 AM 8/4/2023    11:53 AM 8/14/2023    10:04 AM 12/14/2023     3:23 PM 3/21/2024     3:01 PM "   Electrolytes   Na 136 - 145 mmol/L 138  136  140  135  138  143  139    K 3.5 - 5.3 mmol/L 6.1  5.7  6.0  5.5  4.6  3.7  4.0    Cl 98 - 107 mmol/L 109  110  111  108  105  104  99    CO2 21 - 32 mmol/L 18  18  22  20  25  28  30    Cr 0.50 - 1.30 mg/dL 1.75  1.58  1.51  1.42  1.74  1.35  1.33    Ca 8.6 - 10.3 mg/dL 10.6  9.7  9.6  9.7  9.7  9.7  9.9    Phos 2.5 - 4.9 mg/dL 3.8  4.5  4.9  4.2   3.0  3.6          Latest Ref Rng & Units 11/13/2022     5:57 AM 11/30/2022    10:18 AM 7/18/2023    12:29 PM 8/3/2023     2:05 PM 8/4/2023     6:37 AM 12/14/2023     3:23 PM 3/21/2024     3:01 PM   CBC   Hb 13.5 - 17.5 g/dL 9.4  12.6  12.0  12.8  11.5  14.5  16.5    Hct 41.0 - 52.0 % 29.5  41.3  38.6  38.5  35.9  45.4  50.8    MCV 80 - 100 fL 89  91  97  92  93  89  90    RDW 11.5 - 14.5 % 13.6  13.7  13.5  12.3  12.5  13.3  13.2          Latest Ref Rng & Units 10/18/2022    12:30 AM 10/19/2022     6:15 AM 11/30/2022    10:18 AM 7/18/2023    12:29 PM 8/2/2023     2:49 PM 8/3/2023     2:05 PM 8/14/2023    10:04 AM   Lipids   Chol 0 - 199 mg/dL 117    86       HDL mg/dL 33.7    36.2       LDL 0 - 99 mg/dL 40    19       Trig 0 - 149 mg/dL 215    155       ALT 10 - 52 U/L  13  18  25  25  23  32    AST 9 - 39 U/L  14  13  18  20  14  24          Latest Ref Rng & Units 10/20/2022     5:30 AM   Thyroid   TSH 0.44 - 3.98 mIU/L 1.81           No data to display                 Lab Results   Component Value Date    HGBA1C 9.2 (A) 06/27/2024    HGBA1C 8.8 (A) 02/27/2024    HGBA1C 6.9 (A) 08/03/2023        The ASCVD Risk score (Maryan GUILLEN, et al., 2019) failed to calculate for the following reasons:    The patient has a prior MI or stroke diagnosis     Echocardiogram: No results found for this or any previous visit.     Echocardiogram:   PHYSICIAN INTERPRETATION:  Left Ventricle: The left ventricular systolic function is mildly to moderately decreased, with an estimated ejection fraction of 40-45%. There are no regional wall motion  abnormalities. The left ventricular cavity size is normal. Abnormal (paradoxical) septal motion consistent with post-operative status. Spectral Doppler shows an abnormal pattern of left ventricular diastolic filling.  Left Atrium: The left atrium is normal in size.  Right Ventricle: The right ventricle is normal in size. There is low normal right ventricular systolic function.  Right Atrium: The right atrium is normal in size.  Aortic Valve: The aortic valve appears structurally normal. There is evidence of mild aortic valve stenosis.  There is no evidence of aortic valve regurgitation. The peak instantaneous gradient of the aortic valve is 12.4 mmHg. The mean gradient of the aortic valve is 7.0 mmHg.  Mitral Valve: The mitral valve is normal in structure. There is mild mitral valve regurgitation.  Tricuspid Valve: The tricuspid valve is structurally normal. There is mild tricuspid regurgitation.  Pulmonic Valve: The pulmonic valve is not well visualized. The pulmonic valve regurgitation was not well visualized.  Pericardium: There is no pericardial effusion noted.  Aorta: The aortic root is normal.  Pulmonary Artery: The tricuspid regurgitant velocity is 2.57 m/s, and with an estimated right atrial pressure of 10 mmHg, the estimated pulmonary artery pressure is mildly elevated with the RVSP at 36.4 mmHg.  Systemic Veins: The inferior vena cava appears to be of normal size.      CONCLUSIONS:  1. Left ventricular systolic function is mildly to moderately decreased with a 40-45% estimated ejection fraction.  2. Abnormal septal motion consistent with post-operative status.  3. Spectral Doppler shows an abnormal pattern of left ventricular diastolic filling.  4. There is low normal right ventricular systolic function.  5. Mild aortic valve stenosis.  6. There is mild mitral and tricuspid regurgitation.  7. The estimated pulmonary artery pressure is mildly elevated with the RVSP at 36.4 mmHg.    Stress Testing: No  results found for this or any previous visit from the past 1825 days.    Cardiac Catheterization:     Stress test performed: No. CTA performed: No. [place] accessed: No. LVEF Assessed: Yes. LVEF = 40-45%.    Coronary Angiography:  The coronary circulation is right dominant.    Left Main Coronary Artery:  The left main coronary artery is a normal caliber vessel. The left main arises normally from the left coronary sinus of Valsalva and bifurcates into the LAD and circumflex coronary arteries. The left main coronary artery showed severe obstruction. The entire left main coronary artery showed 50% stenosis.    Left Anterior Descending Coronary Artery Distribution:  The left anterior descending coronary artery is a normal caliber vessel. The LAD arises normally from the left main coronary artery. The LAD demonstrated severe proximal obstruction and total occlusion originating at the mid segment. The proximal left anterior descending coronary artery showed 70% stenosis. This lesion was eccentric. The 1st diagonal branch is a normal caliber vessel. The 1st diagonal branch showed severe ostial obstruction. The ostial 1st diagonal branch revealed 90% stenosis.    Circumflex Coronary Artery Distribution:  The circumflex coronary artery is a normal caliber vessel. The circumflex arises normally from the left main coronary artery and terminates in the AV groove. The circumflex revealed severe proximal obstruction and chronic occlusion originating at the mid segment. The proximal circumflex coronary artery showed 70% stenosis. This lesion was eccentric. The 1st obtuse marginal branch is a normal caliber vessel. The 1st obtuse marginal branch showed severe ostial to proximal obstruction.    Right Coronary Artery Distribution:    The right coronary artery is a normal caliber vessel. The RCA arises normally from the right sinus of Valsalva. The RCA showed no significant disease or stenosis greater than 30%.  The right  posterolateral branch is a normal caliber vessel. The right posterior descending artery showed severe ostial to proximal obstruction. This lesion was eccentric and tubular.    Coronary Interventions:    Coronary Lesion Summary:  Vessel         Stenosis   Vessel Segment  Left Main    50% stenosis     entire  LAD          70% stenosis    proximal  1st Diagonal 90% stenosis     ostial  Circumflex   70% stenosis    proximal  RPDA         80% stenosis    ____________________________________________________________________________________  CONCLUSIONS:  1. Multivessel disease as noted above.  2. Findings conveyed to Eugenio Zazueta and Dari Vizcaino.  3. GDMT for ACS and HF.    ____________________________________________________________________________________  Cardiac Scoring: No results found for this or any previous visit from the past 1825 days.    AAA : No results found for this or any previous visit from the past 1825 days.    OTHER: No results found for this or any previous visit from the past 1825 days.        Assessment/Plan:   1. Essential hypertension  amLODIPine (Norvasc) 10 mg tablet           Paetl Goncalves 74 y.o. male who presents for follow-up:    1.  Uncontrolled hypertension  2.  Left upper extremity numbness and tingling  3.  Chronic ischemic heart disease  4.  Status post four-vessel coronary artery bypass graft surgery on 11/28/2022 with Dr. Ewelina Trujillo (MILLER-LAD, VG-OM1-OM2, VG-PLB)  5.  Mild left ventricular systolic dysfunction (LVEF 45%)  6.  Ischemic cardiomyopathy    Increase amlodipine to 10 mg daily.  Will add spironolactone if his blood pressures remain uncontrolled.    Guideline directed medical therapy for heart failure: Metoprolol succinate 200 mg daily, lisinopril 40 mg daily, he has stopped spironolactone.  Additionally, takes furosemide as needed; has not had to take any recently.    Continue guideline directed medical therapy for stable ischemic heart disease: Aspirin 81 mg daily,  clopidogrel 75 mg daily, atorvastatin 80 mg nightly.  Continue metoprolol succinate 200 mg daily.    Will have virtual visit in 1 month to see how his blood pressures are doing.     ____________________________________________________________  Jose Antonio Maynard MD

## 2024-07-01 RX ORDER — INSULIN GLARGINE 100 [IU]/ML
INJECTION, SOLUTION SUBCUTANEOUS
Start: 2024-07-01

## 2024-07-13 DIAGNOSIS — I25.810 CORONARY ARTERY DISEASE INVOLVING CORONARY BYPASS GRAFT OF NATIVE HEART, UNSPECIFIED WHETHER ANGINA PRESENT: ICD-10-CM

## 2024-07-15 RX ORDER — ATORVASTATIN CALCIUM 80 MG/1
80 TABLET, FILM COATED ORAL NIGHTLY
Qty: 90 TABLET | Refills: 0 | Status: SHIPPED | OUTPATIENT
Start: 2024-07-15

## 2024-07-22 ENCOUNTER — TELEPHONE (OUTPATIENT)
Dept: PRIMARY CARE | Facility: CLINIC | Age: 74
End: 2024-07-22
Payer: MEDICARE

## 2024-07-22 DIAGNOSIS — M79.603 PAIN OF UPPER EXTREMITY, UNSPECIFIED LATERALITY: Primary | ICD-10-CM

## 2024-07-23 NOTE — TELEPHONE ENCOUNTER
07/22/2024 Patient would like a referral to ortho for arm/hand pain   07/23/24 Patient advised referral placed

## 2024-08-16 ENCOUNTER — TELEMEDICINE (OUTPATIENT)
Dept: CARDIOLOGY | Facility: HOSPITAL | Age: 74
End: 2024-08-16
Payer: MEDICARE

## 2024-08-16 DIAGNOSIS — Z00.00 HEALTH CARE MAINTENANCE: ICD-10-CM

## 2024-08-16 DIAGNOSIS — I10 UNCONTROLLED HYPERTENSION: Primary | ICD-10-CM

## 2024-08-16 PROCEDURE — 4010F ACE/ARB THERAPY RXD/TAKEN: CPT | Performed by: INTERNAL MEDICINE

## 2024-08-16 PROCEDURE — 1157F ADVNC CARE PLAN IN RCRD: CPT | Performed by: INTERNAL MEDICINE

## 2024-08-16 PROCEDURE — 99214 OFFICE O/P EST MOD 30 MIN: CPT | Performed by: INTERNAL MEDICINE

## 2024-08-16 PROCEDURE — 1159F MED LIST DOCD IN RCRD: CPT | Performed by: INTERNAL MEDICINE

## 2024-08-16 PROCEDURE — 3060F POS MICROALBUMINURIA REV: CPT | Performed by: INTERNAL MEDICINE

## 2024-08-16 RX ORDER — SPIRONOLACTONE 25 MG/1
25 TABLET ORAL DAILY
Qty: 90 TABLET | Refills: 3 | Status: SHIPPED | OUTPATIENT
Start: 2024-08-16 | End: 2025-08-16

## 2024-08-16 RX ORDER — FUROSEMIDE 40 MG/1
40 TABLET ORAL DAILY
Qty: 90 TABLET | Refills: 3 | Status: SHIPPED | OUTPATIENT
Start: 2024-08-16 | End: 2025-08-16

## 2024-08-16 NOTE — PROGRESS NOTES
"Primary Care Physician: Mike Moran MD   Date of Visit: 08/16/2024  8:15 AM EDT  Type of Visit: Follow-up    Chief Complaint:  Chief Complaint   Patient presents with    Follow-up     HTN.  States he does not have a lisinopril prescription and his BP has been averaging \"140's/80's\".        HPI  Patel Goncalves 74 y.o. male who presents for follow-up.    Previous non-ST elevation myocardial infarction with multivessel disease requiring four-vessel coronary artery bypass graft surgery (LIMA-LAD, VG-OM1-OM2, VG-PLB).    He denies any angina, shortness of breath, paroxysmal nocturnal dyspnea, orthopnea, peripheral edema, near-syncope, syncope, or palpitations.    Blood pressures remains slightly elevated on his home reads, usually ~140 mmHg but some greater than 150 mmHg..    Review of Systems   12 point review of systems was obtained in detail and is negative other than that noted above or below.     Past Medical/Surgical History:  Patel has a past medical history of Bitten or stung by nonvenomous insect and other nonvenomous arthropods, initial encounter (10/21/2019), Cutaneous abscess of left upper limb (06/03/2015), Disorder of the skin and subcutaneous tissue, unspecified (07/30/2020), Essential hypertension (05/19/2010), Fatigue (06/27/2024), Personal history of other diseases of urinary system (01/28/2019), Personal history of other specified conditions (04/16/2019), Prepatellar bursitis, unspecified knee (10/30/2018), Renal glomerular disease (07/13/2023), Unspecified renal colic (01/28/2019), and Urethral stricture (06/27/2024).    Patel has a past surgical history that includes Cardiac surgery.    Social/Family History:  Patel reports that he has never smoked. He has never used smokeless tobacco. He reports current alcohol use. He reports that he does not currently use drugs.    Patel's family history includes Aortic aneurysm in his brother; Diabetes in his maternal " "grandmother.    Allergies:  Allergies   Allergen Reactions    Bee Venom Protein (Honey Bee) Hives and Itching     Hives from Hornets    Naproxen Sodium Itching       Medications:  Current Outpatient Medications   Medication Sig Dispense Refill    amLODIPine (Norvasc) 10 mg tablet Take 1 tablet (10 mg) by mouth once daily. 90 tablet 3    aspirin 81 mg chewable tablet Chew once daily.      atorvastatin (Lipitor) 80 mg tablet TAKE 1 TABLET BY MOUTH ONCE DAILY AT BEDTIME 90 tablet 0    clopidogrel (Plavix) 75 mg tablet Take 1 tablet (75 mg) by mouth once daily. 90 tablet 0    dapagliflozin propanediol (Farxiga) 10 mg Take 1 tablet (10 mg) by mouth once daily. 30 tablet 11    flash glucose sensor kit (FreeStyle Evy 2 Sensor) kit Use as instructed 6 each 1    FreeStyle Evy 2 Sensor kit REPLACE SENSOR EVERY 14 DAYS 2 each 0    FreeStyle Evy reader (FreeStyle Evy 2 Brooks) misc Use as instructed 1 each 0    insulin glargine (Lantus Solostar U-100 Insulin) 100 unit/mL (3 mL) pen INJECT 40 UNITS SUBCUTANEOUSLY AT BEDTIME      insulin lispro (HumaLOG) 100 unit/mL injection INJECT 8 UNITS SUBCUTANEOUSLY 3 TIMES DAILY PLUS SLIDING SCALE. 151-200, 2 UNITS. 201-250, 4 UNITS. 251-300, 6 UNITS. 301-350, 8 UNITS. 351-400, 10 UNITS. MAX MEAL DOSE 18 UNITS. MAX DAILY 54 UNITS 10 mL 2    metoprolol succinate XL (Toprol-XL) 200 mg 24 hr tablet Take 1 tablet by mouth once daily 90 tablet 0    multivitamin tablet Take 1 tablet by mouth once daily.      pen needle, diabetic 31 gauge x 5/16\" needle USE 1 NEW PEN NEEDLE 4 TIMES DAILY 400 each 0    Ferrex 150 150 mg iron capsule Take 1 capsule (150 mg) by mouth once daily.      furosemide (Lasix) 40 mg tablet Take 1 tablet (40 mg) by mouth once daily. 90 tablet 3    lisinopril 40 mg tablet Take 1 tablet (40 mg) by mouth once daily. for blood pressure      spironolactone (Aldactone) 25 mg tablet Take 1 tablet (25 mg) by mouth once daily. 90 tablet 3     No current facility-administered " medications for this visit.       Objective:   There were no vitals filed for this visit.    Virtual visit    Labs and Imaging:      Latest Ref Rng & Units 8/3/2023     3:49 PM 8/3/2023     9:51 PM 8/4/2023     6:37 AM 8/4/2023    11:53 AM 8/14/2023    10:04 AM 12/14/2023     3:23 PM 3/21/2024     3:01 PM   Electrolytes   Na 136 - 145 mmol/L 138  136  140  135  138  143  139    K 3.5 - 5.3 mmol/L 6.1  5.7  6.0  5.5  4.6  3.7  4.0    Cl 98 - 107 mmol/L 109  110  111  108  105  104  99    CO2 21 - 32 mmol/L 18  18  22  20  25  28  30    Cr 0.50 - 1.30 mg/dL 1.75  1.58  1.51  1.42  1.74  1.35  1.33    Ca 8.6 - 10.3 mg/dL 10.6  9.7  9.6  9.7  9.7  9.7  9.9    Phos 2.5 - 4.9 mg/dL 3.8  4.5  4.9  4.2   3.0  3.6          Latest Ref Rng & Units 11/13/2022     5:57 AM 11/30/2022    10:18 AM 7/18/2023    12:29 PM 8/3/2023     2:05 PM 8/4/2023     6:37 AM 12/14/2023     3:23 PM 3/21/2024     3:01 PM   CBC   Hb 13.5 - 17.5 g/dL 9.4  12.6  12.0  12.8  11.5  14.5  16.5    Hct 41.0 - 52.0 % 29.5  41.3  38.6  38.5  35.9  45.4  50.8    MCV 80 - 100 fL 89  91  97  92  93  89  90    RDW 11.5 - 14.5 % 13.6  13.7  13.5  12.3  12.5  13.3  13.2          Latest Ref Rng & Units 10/18/2022    12:30 AM 10/19/2022     6:15 AM 11/30/2022    10:18 AM 7/18/2023    12:29 PM 8/2/2023     2:49 PM 8/3/2023     2:05 PM 8/14/2023    10:04 AM   Lipids   Chol 0 - 199 mg/dL 117    86       HDL mg/dL 33.7    36.2       LDL 0 - 99 mg/dL 40    19       Trig 0 - 149 mg/dL 215    155       ALT 10 - 52 U/L  13  18  25  25  23  32    AST 9 - 39 U/L  14  13  18  20  14  24          Latest Ref Rng & Units 10/20/2022     5:30 AM   Thyroid   TSH 0.44 - 3.98 mIU/L 1.81           No data to display                 Lab Results   Component Value Date    HGBA1C 9.2 (A) 06/27/2024    HGBA1C 8.8 (A) 02/27/2024    HGBA1C 6.9 (A) 08/03/2023        The ASCVD Risk score (Maryan DK, et al., 2019) failed to calculate for the following reasons:    The patient has a prior MI or  stroke diagnosis     Echocardiogram: No results found for this or any previous visit.     Echocardiogram:   PHYSICIAN INTERPRETATION:  Left Ventricle: The left ventricular systolic function is mildly to moderately decreased, with an estimated ejection fraction of 40-45%. There are no regional wall motion abnormalities. The left ventricular cavity size is normal. Abnormal (paradoxical) septal motion consistent with post-operative status. Spectral Doppler shows an abnormal pattern of left ventricular diastolic filling.  Left Atrium: The left atrium is normal in size.  Right Ventricle: The right ventricle is normal in size. There is low normal right ventricular systolic function.  Right Atrium: The right atrium is normal in size.  Aortic Valve: The aortic valve appears structurally normal. There is evidence of mild aortic valve stenosis.  There is no evidence of aortic valve regurgitation. The peak instantaneous gradient of the aortic valve is 12.4 mmHg. The mean gradient of the aortic valve is 7.0 mmHg.  Mitral Valve: The mitral valve is normal in structure. There is mild mitral valve regurgitation.  Tricuspid Valve: The tricuspid valve is structurally normal. There is mild tricuspid regurgitation.  Pulmonic Valve: The pulmonic valve is not well visualized. The pulmonic valve regurgitation was not well visualized.  Pericardium: There is no pericardial effusion noted.  Aorta: The aortic root is normal.  Pulmonary Artery: The tricuspid regurgitant velocity is 2.57 m/s, and with an estimated right atrial pressure of 10 mmHg, the estimated pulmonary artery pressure is mildly elevated with the RVSP at 36.4 mmHg.  Systemic Veins: The inferior vena cava appears to be of normal size.      CONCLUSIONS:  1. Left ventricular systolic function is mildly to moderately decreased with a 40-45% estimated ejection fraction.  2. Abnormal septal motion consistent with post-operative status.  3. Spectral Doppler shows an abnormal pattern  of left ventricular diastolic filling.  4. There is low normal right ventricular systolic function.  5. Mild aortic valve stenosis.  6. There is mild mitral and tricuspid regurgitation.  7. The estimated pulmonary artery pressure is mildly elevated with the RVSP at 36.4 mmHg.    Stress Testing: No results found for this or any previous visit from the past 1825 days.    Cardiac Catheterization:     Stress test performed: No. CTA performed: No. [facility] accessed: No. LVEF Assessed: Yes. LVEF = 40-45%.    Coronary Angiography:  The coronary circulation is right dominant.    Left Main Coronary Artery:  The left main coronary artery is a normal caliber vessel. The left main arises normally from the left coronary sinus of Valsalva and bifurcates into the LAD and circumflex coronary arteries. The left main coronary artery showed severe obstruction. The entire left main coronary artery showed 50% stenosis.    Left Anterior Descending Coronary Artery Distribution:  The left anterior descending coronary artery is a normal caliber vessel. The LAD arises normally from the left main coronary artery. The LAD demonstrated severe proximal obstruction and total occlusion originating at the mid segment. The proximal left anterior descending coronary artery showed 70% stenosis. This lesion was eccentric. The 1st diagonal branch is a normal caliber vessel. The 1st diagonal branch showed severe ostial obstruction. The ostial 1st diagonal branch revealed 90% stenosis.    Circumflex Coronary Artery Distribution:  The circumflex coronary artery is a normal caliber vessel. The circumflex arises normally from the left main coronary artery and terminates in the AV groove. The circumflex revealed severe proximal obstruction and chronic occlusion originating at the mid segment. The proximal circumflex coronary artery showed 70% stenosis. This lesion was eccentric. The 1st obtuse marginal branch is a normal caliber vessel. The 1st obtuse  marginal branch showed severe ostial to proximal obstruction.    Right Coronary Artery Distribution:    The right coronary artery is a normal caliber vessel. The RCA arises normally from the right sinus of Valsalva. The RCA showed no significant disease or stenosis greater than 30%.  The right posterolateral branch is a normal caliber vessel. The right posterior descending artery showed severe ostial to proximal obstruction. This lesion was eccentric and tubular.    Coronary Interventions:    Coronary Lesion Summary:  Vessel         Stenosis   Vessel Segment  Left Main    50% stenosis     entire  LAD          70% stenosis    proximal  1st Diagonal 90% stenosis     ostial  Circumflex   70% stenosis    proximal  RPDA         80% stenosis    ____________________________________________________________________________________  CONCLUSIONS:  1. Multivessel disease as noted above.  2. Findings conveyed to Eugenio Zazueta and Dari Vizcaino.  3. GDMT for ACS and HF.    ____________________________________________________________________________________  Cardiac Scoring: No results found for this or any previous visit from the past 1825 days.    AAA : No results found for this or any previous visit from the past 1825 days.    OTHER: No results found for this or any previous visit from the past 1825 days.        Assessment/Plan:   1. Uncontrolled hypertension  spironolactone (Aldactone) 25 mg tablet    Basic Metabolic Panel      2. Health care maintenance  furosemide (Lasix) 40 mg tablet           Patel Goncalves 74 y.o. male who presents for follow-up:    1.  Uncontrolled hypertension  2.  Left upper extremity numbness and tingling  3.  Chronic ischemic heart disease  4.  Status post four-vessel coronary artery bypass graft surgery on 11/28/2022 with Dr. Ewelina Trujillo (MILLER-LAD, VG-OM1-OM2, VG-PLB)  5.  Mild left ventricular systolic dysfunction (LVEF 45%)  6.  Ischemic cardiomyopathy    Still SBP > 140 mmHg, start spironolactone  25 mg daily.    Guideline directed medical therapy for heart failure: Metoprolol succinate 200 mg daily, lisinopril 40 mg daily, resume spironolactone.  Is taking furosemide daily, will refill.    Continue guideline directed medical therapy for stable ischemic heart disease: Aspirin 81 mg daily, clopidogrel 75 mg daily, atorvastatin 80 mg nightly.  Continue metoprolol succinate 200 mg daily.    Check basic metabolic panel in 2 weeks.    RTC in one year.     ____________________________________________________________  Jose Antonio Maynard MD

## 2024-08-21 DIAGNOSIS — Z79.4 TYPE 2 DIABETES MELLITUS WITH OTHER SPECIFIED COMPLICATION, WITH LONG-TERM CURRENT USE OF INSULIN (MULTI): ICD-10-CM

## 2024-08-21 DIAGNOSIS — E11.69 TYPE 2 DIABETES MELLITUS WITH OTHER SPECIFIED COMPLICATION, WITH LONG-TERM CURRENT USE OF INSULIN (MULTI): ICD-10-CM

## 2024-08-21 RX ORDER — INSULIN GLARGINE 100 [IU]/ML
INJECTION, SOLUTION SUBCUTANEOUS
Qty: 30 ML | Refills: 0 | Status: SHIPPED | OUTPATIENT
Start: 2024-08-21

## 2024-08-24 ENCOUNTER — HOSPITAL ENCOUNTER (EMERGENCY)
Facility: HOSPITAL | Age: 74
Discharge: HOME | End: 2024-08-24
Payer: MEDICARE

## 2024-08-24 ENCOUNTER — APPOINTMENT (OUTPATIENT)
Dept: RADIOLOGY | Facility: HOSPITAL | Age: 74
End: 2024-08-24
Payer: MEDICARE

## 2024-08-24 VITALS
DIASTOLIC BLOOD PRESSURE: 64 MMHG | HEIGHT: 69 IN | WEIGHT: 195 LBS | OXYGEN SATURATION: 97 % | RESPIRATION RATE: 16 BRPM | TEMPERATURE: 97.7 F | SYSTOLIC BLOOD PRESSURE: 138 MMHG | BODY MASS INDEX: 28.88 KG/M2 | HEART RATE: 62 BPM

## 2024-08-24 DIAGNOSIS — T56.0X1A LEAD-INDUCED ACUTE GOUT OF RIGHT ANKLE, INITIAL ENCOUNTER: Primary | ICD-10-CM

## 2024-08-24 DIAGNOSIS — M10.171 LEAD-INDUCED ACUTE GOUT OF RIGHT ANKLE, INITIAL ENCOUNTER: Primary | ICD-10-CM

## 2024-08-24 PROCEDURE — 73610 X-RAY EXAM OF ANKLE: CPT | Mod: RT

## 2024-08-24 PROCEDURE — 99284 EMERGENCY DEPT VISIT MOD MDM: CPT

## 2024-08-24 PROCEDURE — 73630 X-RAY EXAM OF FOOT: CPT | Mod: RT

## 2024-08-24 PROCEDURE — 73630 X-RAY EXAM OF FOOT: CPT | Mod: RIGHT SIDE | Performed by: RADIOLOGY

## 2024-08-24 PROCEDURE — 2500000001 HC RX 250 WO HCPCS SELF ADMINISTERED DRUGS (ALT 637 FOR MEDICARE OP): Performed by: NURSE PRACTITIONER

## 2024-08-24 PROCEDURE — 73610 X-RAY EXAM OF ANKLE: CPT | Mod: RIGHT SIDE | Performed by: RADIOLOGY

## 2024-08-24 RX ORDER — COLCHICINE 0.6 MG/1
1.2 TABLET ORAL ONCE
Status: COMPLETED | OUTPATIENT
Start: 2024-08-24 | End: 2024-08-24

## 2024-08-24 RX ORDER — COLCHICINE 0.6 MG/1
0.6 TABLET ORAL 2 TIMES DAILY PRN
Qty: 6 TABLET | Refills: 0 | Status: SHIPPED | OUTPATIENT
Start: 2024-08-24 | End: 2024-08-27

## 2024-08-24 RX ADMIN — COLCHICINE 1.2 MG: 0.6 TABLET, FILM COATED ORAL at 13:03

## 2024-08-24 ASSESSMENT — LIFESTYLE VARIABLES
TOTAL SCORE: 0
EVER FELT BAD OR GUILTY ABOUT YOUR DRINKING: NO
HAVE YOU EVER FELT YOU SHOULD CUT DOWN ON YOUR DRINKING: NO
EVER HAD A DRINK FIRST THING IN THE MORNING TO STEADY YOUR NERVES TO GET RID OF A HANGOVER: NO
HAVE PEOPLE ANNOYED YOU BY CRITICIZING YOUR DRINKING: NO

## 2024-08-24 ASSESSMENT — COLUMBIA-SUICIDE SEVERITY RATING SCALE - C-SSRS
1. IN THE PAST MONTH, HAVE YOU WISHED YOU WERE DEAD OR WISHED YOU COULD GO TO SLEEP AND NOT WAKE UP?: NO
6. HAVE YOU EVER DONE ANYTHING, STARTED TO DO ANYTHING, OR PREPARED TO DO ANYTHING TO END YOUR LIFE?: NO
2. HAVE YOU ACTUALLY HAD ANY THOUGHTS OF KILLING YOURSELF?: NO

## 2024-08-24 ASSESSMENT — PAIN SCALES - GENERAL
PAINLEVEL_OUTOF10: 0 - NO PAIN
PAINLEVEL_OUTOF10: 0 - NO PAIN

## 2024-08-24 ASSESSMENT — PAIN - FUNCTIONAL ASSESSMENT
PAIN_FUNCTIONAL_ASSESSMENT: 0-10
PAIN_FUNCTIONAL_ASSESSMENT: 0-10
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
## 2024-09-09 ENCOUNTER — HOSPITAL ENCOUNTER (OUTPATIENT)
Dept: RADIOLOGY | Facility: CLINIC | Age: 74
Discharge: HOME | End: 2024-09-09
Payer: MEDICARE

## 2024-09-09 DIAGNOSIS — M25.512 LEFT SHOULDER PAIN, UNSPECIFIED CHRONICITY: ICD-10-CM

## 2024-09-09 PROCEDURE — 73030 X-RAY EXAM OF SHOULDER: CPT | Mod: LEFT SIDE | Performed by: RADIOLOGY

## 2024-09-09 PROCEDURE — 73030 X-RAY EXAM OF SHOULDER: CPT | Mod: LT

## 2024-09-10 ENCOUNTER — OFFICE VISIT (OUTPATIENT)
Dept: ORTHOPEDIC SURGERY | Facility: CLINIC | Age: 74
End: 2024-09-10
Payer: MEDICARE

## 2024-09-10 DIAGNOSIS — G56.02 CARPAL TUNNEL SYNDROME ON LEFT: ICD-10-CM

## 2024-09-10 DIAGNOSIS — G56.22 CUBITAL TUNNEL SYNDROME ON LEFT: ICD-10-CM

## 2024-09-10 DIAGNOSIS — G56.03 BILATERAL CARPAL TUNNEL SYNDROME: ICD-10-CM

## 2024-09-10 DIAGNOSIS — M54.12 CERVICAL RADICULOPATHY: ICD-10-CM

## 2024-09-10 DIAGNOSIS — M25.512 LEFT SHOULDER PAIN, UNSPECIFIED CHRONICITY: ICD-10-CM

## 2024-09-10 DIAGNOSIS — M72.0 DUPUYTREN'S CONTRACTURE OF BOTH HANDS: Primary | ICD-10-CM

## 2024-09-10 DIAGNOSIS — M79.603 PAIN OF UPPER EXTREMITY, UNSPECIFIED LATERALITY: ICD-10-CM

## 2024-09-10 PROCEDURE — 3060F POS MICROALBUMINURIA REV: CPT | Performed by: ORTHOPAEDIC SURGERY

## 2024-09-10 PROCEDURE — 1157F ADVNC CARE PLAN IN RCRD: CPT | Performed by: ORTHOPAEDIC SURGERY

## 2024-09-10 PROCEDURE — 1036F TOBACCO NON-USER: CPT | Performed by: ORTHOPAEDIC SURGERY

## 2024-09-10 PROCEDURE — 1159F MED LIST DOCD IN RCRD: CPT | Performed by: ORTHOPAEDIC SURGERY

## 2024-09-10 PROCEDURE — 99204 OFFICE O/P NEW MOD 45 MIN: CPT | Performed by: ORTHOPAEDIC SURGERY

## 2024-09-10 PROCEDURE — 99214 OFFICE O/P EST MOD 30 MIN: CPT | Performed by: ORTHOPAEDIC SURGERY

## 2024-09-10 PROCEDURE — 4010F ACE/ARB THERAPY RXD/TAKEN: CPT | Performed by: ORTHOPAEDIC SURGERY

## 2024-09-10 PROCEDURE — 1160F RVW MEDS BY RX/DR IN RCRD: CPT | Performed by: ORTHOPAEDIC SURGERY

## 2024-09-10 ASSESSMENT — PAIN SCALES - GENERAL: PAINLEVEL_OUTOF10: 0 - NO PAIN

## 2024-09-10 ASSESSMENT — ENCOUNTER SYMPTOMS
NECK PAIN: 0
NUMBNESS: 1
NECK STIFFNESS: 0
JOINT SWELLING: 1
DIZZINESS: 0
HEMATOLOGIC/LYMPHATIC NEGATIVE: 1
BACK PAIN: 1
ARTHRALGIAS: 1
MYALGIAS: 1

## 2024-09-10 ASSESSMENT — PAIN - FUNCTIONAL ASSESSMENT: PAIN_FUNCTIONAL_ASSESSMENT: 0-10

## 2024-09-10 NOTE — PROGRESS NOTES
Reason for Appointment  Chief Complaint   Patient presents with    Left Shoulder - Pain     History of Present Illness  New patient is a 74 y.o. male here today for evaluation of left upper extremity pain and numbness, pleasant gentleman with complex medical history who had a previous left shoulder injury but also had previous coronary artery bypass with sternal wires seen he does have calcific tendinitis and x-rays of the shoulder show moderate AC joint arthritis.  He developed significant stiffness and pain in the hands with flexion contractures in the hands and has some Dupuytren's disease in the hands and the ring fingers but nothing severe in terms of contractures.  More of the contractures are due to arthritic change in the hands.  He is on Plavix.  He does have a significant history of hypertension coronary artery disease diabetic high blood pressure.  Full chart review performed.  Significant medical history and comorbidities.  Has classic numbness and carpal tunnel symptoms in the left hand worse with gripping better with rest.  No triggering has tried numerous creams and gels in the hands without significant relief..     Past Medical History:   Diagnosis Date    Bitten or stung by nonvenomous insect and other nonvenomous arthropods, initial encounter 10/21/2019    Tick bite    Cutaneous abscess of left upper limb 06/03/2015    Abscess of arm, left    Disorder of the skin and subcutaneous tissue, unspecified 07/30/2020    Changing skin lesion    Essential hypertension 05/19/2010    Fatigue 06/27/2024    Personal history of other diseases of urinary system 01/28/2019    History of hematuria    Personal history of other specified conditions 04/16/2019    History of chronic fatigue    Prepatellar bursitis, unspecified knee 10/30/2018    Prepatellar bursitis    Renal glomerular disease 07/13/2023    Unspecified renal colic 01/28/2019    Kidney pain    Urethral stricture 06/27/2024       Past Surgical History:    Procedure Laterality Date    CARDIAC SURGERY      bypass       Medication Documentation Review Audit       Reviewed by Regi Hurt MA (Medical Assistant) on 09/10/24 at 0811      Medication Order Taking? Sig Documenting Provider Last Dose Status   amLODIPine (Norvasc) 10 mg tablet [id number] Yes Take 1 tablet (10 mg) by mouth once daily. Jose Antonio Maynard MD Taking Active   aspirin 81 mg chewable tablet [id number] Yes Chew once daily. Historical Provider, MD Taking Active   atorvastatin (Lipitor) 80 mg tablet [id number] Yes TAKE 1 TABLET BY MOUTH ONCE DAILY AT BEDTIME Mike Moran MD Taking Active   clopidogrel (Plavix) 75 mg tablet [id number] Yes Take 1 tablet (75 mg) by mouth once daily. Mike Moran MD Taking Active   dapagliflozin propanediol (Farxiga) 10 mg [id number] Yes Take 1 tablet (10 mg) by mouth once daily. Mike Moran MD Taking Active   Ferrex 150 150 mg iron capsule [id number] Yes Take 1 capsule (150 mg) by mouth once daily. Historical Provider, MD Taking Active   flash glucose sensor kit (FreeStyle Evy 2 Sensor) kit [id number] Yes Use as instructed Mike Moran MD Taking Active   FreeStyle Evy 2 Sensor kit [id number] Yes REPLACE SENSOR EVERY 14 DAYS Mike Moran MD Taking Active   FreeStyle Evy reader (FreeStyle Evy 2 [place]) misc [id number] Yes Use as instructed Ghanshyam Burton MD Taking Active   furosemide (Lasix) 40 mg tablet [id number] Yes Take 1 tablet (40 mg) by mouth once daily. Jose Antonio Maynard MD Taking Active   insulin glargine (Lantus Solostar U-100 Insulin) 100 unit/mL (3 mL) pen [id number] Yes INJECT 35 UNITS SUBCUTANEOUSLY AT BEDTIME Mike Moran MD Taking Active   insulin lispro (HumaLOG) 100 unit/mL injection [id number] Yes INJECT 8 UNITS SUBCUTANEOUSLY 3 TIMES DAILY PLUS SLIDING SCALE. 151-200, 2 UNITS. 201-250, 4 UNITS. 251-300, 6 UNITS. 301-350, 8 UNITS. 351-400, 10 UNITS. MAX MEAL DOSE 18 UNITS. MAX DAILY 54 UNITS Mike Moran MD Taking Active  "  lisinopril 40 mg tablet [id number] Yes Take 1 tablet (40 mg) by mouth once daily. for blood pressure Historical Provider, MD Taking Active   metoprolol succinate XL (Toprol-XL) 200 mg 24 hr tablet [id number] Yes Take 1 tablet by mouth once daily Mike Moran MD Taking Active   multivitamin tablet [id number] Yes Take 1 tablet by mouth once daily. Historical Provider, MD Taking Active   pen needle, diabetic 31 gauge x 5/16\" needle [id number] Yes USE 1 NEW PEN NEEDLE 4 TIMES DAILY Mike Moran MD Taking Active   spironolactone (Aldactone) 25 mg tablet [id number] Yes Take 1 tablet (25 mg) by mouth once daily. Jose Antonioallyssa Maynard MD Taking Active                    Allergies   Allergen Reactions    Bee Venom Protein (Honey Bee) Hives and Itching     Hives from Hornets    Naproxen Sodium Itching       Review of Systems   Musculoskeletal:  Positive for arthralgias, back pain, joint swelling and myalgias. Negative for gait problem, neck pain and neck stiffness.   Skin: Negative.    Neurological:  Positive for numbness. Negative for dizziness.   Hematological: Negative.        Exam   On examination patient is alert awake no acute distress oriented person place and time mood is good neck shows some stiffness and some tenderness but nothing focal no scapular winging symmetric good shoulder range of motion no cuff weakness good deltoid biceps triceps wrist flexion extension strength no hyperreflexia good pulses good baseline sensation except left hand does show markedly positive Tinel's and median nerve compression test minimal symptoms on the right no significant atrophy no wrist instability classic arthritic changes in the hands with some Dupuytren's in the ring fingers both hands with some prevention of hyperextension but no significant contracture maybe only 10 degrees.  Mild PIP flexion contractures due to arthritic change no skin changes negative Mendy sign  Assessment   Encounter Diagnoses   Name Primary?    Left " shoulder pain, unspecified chronicity     Pain of upper extremity, unspecified laterality     Bilateral carpal tunnel syndrome     Cervical radiculopathy     Carpal tunnel syndrome on left     Cubital tunnel syndrome on left        Plan     At this juncture we had a long discussion about his various problems.  We can talk about injections in the future in both hands in the future but he is doing well at present.  Major problem is the numbness that he is having and like to get an EMG and nerve conduction study we talked about possible nerve entrapment with possible carpal tunnel release in the future and what that would entail.  If his Dupuytren's worsens we also talked about Xiaflex versus surgical intervention in that area no other intervention is necessary at this point we like to avoid surgery unless necessary

## 2024-09-15 DIAGNOSIS — E11.69 TYPE 2 DIABETES MELLITUS WITH OTHER SPECIFIED COMPLICATION, UNSPECIFIED WHETHER LONG TERM INSULIN USE (MULTI): ICD-10-CM

## 2024-09-16 RX ORDER — FLASH GLUCOSE SENSOR
KIT MISCELLANEOUS
Qty: 6 EACH | Refills: 0 | Status: SHIPPED | OUTPATIENT
Start: 2024-09-16

## 2024-09-23 DIAGNOSIS — I25.810 CORONARY ARTERY DISEASE INVOLVING CORONARY BYPASS GRAFT OF NATIVE HEART, UNSPECIFIED WHETHER ANGINA PRESENT: ICD-10-CM

## 2024-09-23 RX ORDER — CLOPIDOGREL BISULFATE 75 MG/1
75 TABLET ORAL DAILY
Qty: 90 TABLET | Refills: 0 | Status: SHIPPED | OUTPATIENT
Start: 2024-09-23

## 2024-09-30 DIAGNOSIS — I25.810 CORONARY ARTERY DISEASE INVOLVING CORONARY BYPASS GRAFT OF NATIVE HEART, UNSPECIFIED WHETHER ANGINA PRESENT: ICD-10-CM

## 2024-09-30 RX ORDER — METOPROLOL SUCCINATE 200 MG/1
200 TABLET, EXTENDED RELEASE ORAL DAILY
Qty: 90 TABLET | Refills: 0 | Status: SHIPPED | OUTPATIENT
Start: 2024-09-30

## 2024-10-08 DIAGNOSIS — E11.9 TYPE 2 DIABETES MELLITUS WITHOUT COMPLICATION, WITH LONG-TERM CURRENT USE OF INSULIN (MULTI): ICD-10-CM

## 2024-10-08 DIAGNOSIS — Z79.4 TYPE 2 DIABETES MELLITUS WITHOUT COMPLICATION, WITH LONG-TERM CURRENT USE OF INSULIN (MULTI): ICD-10-CM

## 2024-10-08 RX ORDER — PEN NEEDLE, DIABETIC 30 GX3/16"
NEEDLE, DISPOSABLE MISCELLANEOUS
Qty: 400 EACH | Refills: 0 | Status: SHIPPED | OUTPATIENT
Start: 2024-10-08

## 2024-10-10 DIAGNOSIS — I25.810 CORONARY ARTERY DISEASE INVOLVING CORONARY BYPASS GRAFT OF NATIVE HEART, UNSPECIFIED WHETHER ANGINA PRESENT: ICD-10-CM

## 2024-10-11 RX ORDER — ATORVASTATIN CALCIUM 80 MG/1
80 TABLET, FILM COATED ORAL NIGHTLY
Qty: 90 TABLET | Refills: 0 | Status: SHIPPED | OUTPATIENT
Start: 2024-10-11

## 2024-10-29 ENCOUNTER — APPOINTMENT (OUTPATIENT)
Dept: PRIMARY CARE | Facility: CLINIC | Age: 74
End: 2024-10-29
Payer: MEDICARE

## 2024-11-06 ENCOUNTER — APPOINTMENT (OUTPATIENT)
Dept: PRIMARY CARE | Facility: CLINIC | Age: 74
End: 2024-11-06
Payer: MEDICARE

## 2024-11-06 VITALS
BODY MASS INDEX: 29.33 KG/M2 | RESPIRATION RATE: 18 BRPM | OXYGEN SATURATION: 93 % | WEIGHT: 198 LBS | DIASTOLIC BLOOD PRESSURE: 81 MMHG | HEIGHT: 69 IN | SYSTOLIC BLOOD PRESSURE: 144 MMHG | HEART RATE: 63 BPM

## 2024-11-06 DIAGNOSIS — I10 UNCONTROLLED HYPERTENSION: ICD-10-CM

## 2024-11-06 DIAGNOSIS — I25.119 CORONARY ARTERY DISEASE INVOLVING NATIVE CORONARY ARTERY OF NATIVE HEART WITH ANGINA PECTORIS: ICD-10-CM

## 2024-11-06 DIAGNOSIS — Z95.1 S/P CABG (CORONARY ARTERY BYPASS GRAFT): ICD-10-CM

## 2024-11-06 DIAGNOSIS — I48.91 ATRIAL FIBRILLATION, UNSPECIFIED TYPE (MULTI): ICD-10-CM

## 2024-11-06 DIAGNOSIS — R25.2 LEG CRAMPS: ICD-10-CM

## 2024-11-06 DIAGNOSIS — N18.9 CHRONIC KIDNEY DISEASE, UNSPECIFIED CKD STAGE: ICD-10-CM

## 2024-11-06 DIAGNOSIS — E78.49 OTHER HYPERLIPIDEMIA: ICD-10-CM

## 2024-11-06 DIAGNOSIS — Z79.4 TYPE 2 DIABETES MELLITUS WITHOUT COMPLICATION, WITH LONG-TERM CURRENT USE OF INSULIN (MULTI): ICD-10-CM

## 2024-11-06 DIAGNOSIS — N52.9 ERECTILE DYSFUNCTION, UNSPECIFIED ERECTILE DYSFUNCTION TYPE: ICD-10-CM

## 2024-11-06 DIAGNOSIS — E11.9 TYPE 2 DIABETES MELLITUS WITHOUT COMPLICATION, WITH LONG-TERM CURRENT USE OF INSULIN (MULTI): ICD-10-CM

## 2024-11-06 LAB — POC HEMOGLOBIN A1C: 8.4 % (ref 4.2–6.5)

## 2024-11-06 PROCEDURE — 3079F DIAST BP 80-89 MM HG: CPT | Performed by: INTERNAL MEDICINE

## 2024-11-06 PROCEDURE — 3077F SYST BP >= 140 MM HG: CPT | Performed by: INTERNAL MEDICINE

## 2024-11-06 PROCEDURE — 1157F ADVNC CARE PLAN IN RCRD: CPT | Performed by: INTERNAL MEDICINE

## 2024-11-06 PROCEDURE — 99213 OFFICE O/P EST LOW 20 MIN: CPT | Performed by: INTERNAL MEDICINE

## 2024-11-06 PROCEDURE — 83036 HEMOGLOBIN GLYCOSYLATED A1C: CPT | Performed by: INTERNAL MEDICINE

## 2024-11-06 PROCEDURE — 1160F RVW MEDS BY RX/DR IN RCRD: CPT | Performed by: INTERNAL MEDICINE

## 2024-11-06 PROCEDURE — 3060F POS MICROALBUMINURIA REV: CPT | Performed by: INTERNAL MEDICINE

## 2024-11-06 PROCEDURE — 4010F ACE/ARB THERAPY RXD/TAKEN: CPT | Performed by: INTERNAL MEDICINE

## 2024-11-06 PROCEDURE — 1159F MED LIST DOCD IN RCRD: CPT | Performed by: INTERNAL MEDICINE

## 2024-11-06 PROCEDURE — 1126F AMNT PAIN NOTED NONE PRSNT: CPT | Performed by: INTERNAL MEDICINE

## 2024-11-06 PROCEDURE — 1036F TOBACCO NON-USER: CPT | Performed by: INTERNAL MEDICINE

## 2024-11-06 RX ORDER — TADALAFIL 5 MG/1
5 TABLET ORAL DAILY
Qty: 10 TABLET | Refills: 0 | Status: SHIPPED | OUTPATIENT
Start: 2024-11-06 | End: 2024-11-14 | Stop reason: SDUPTHER

## 2024-11-06 ASSESSMENT — PATIENT HEALTH QUESTIONNAIRE - PHQ9
10. IF YOU CHECKED OFF ANY PROBLEMS, HOW DIFFICULT HAVE THESE PROBLEMS MADE IT FOR YOU TO DO YOUR WORK, TAKE CARE OF THINGS AT HOME, OR GET ALONG WITH OTHER PEOPLE: NOT DIFFICULT AT ALL
SUM OF ALL RESPONSES TO PHQ9 QUESTIONS 1 AND 2: 1
1. LITTLE INTEREST OR PLEASURE IN DOING THINGS: NOT AT ALL
2. FEELING DOWN, DEPRESSED OR HOPELESS: SEVERAL DAYS

## 2024-11-06 ASSESSMENT — PAIN SCALES - GENERAL: PAINLEVEL_OUTOF10: 0-NO PAIN

## 2024-11-06 NOTE — PROGRESS NOTES
"Patient ID: He states he has been feeling good. Denies any SOB, CP or cough. He denies any issues with urination. He denies any constipation or diarrhea. He states he has been taking his medications as ordered. He states that his BP is usually lower than what it is today, checks it at home. Advised to check 3X per week and call office after 3 weeks to report results. Advised we need to get SBP < 140. We need to check bloodwork as well to monitor K+ and Bun/creatnine. Advised if K+ elevated, will have to stop Aldactone. He states he has been having a lot of leg cramps at night. Advised he can try OTC Magnesium. He has never tried before. He also has issues with ED and it is affecting with quality of life. He has never tried any medications for this. Advised to avoid nitroglycerin if he develops chest pain while taking Cialis.     HEALTH MAINTENANCE: Follow Up  Last Office Visit: 6/27/24  Last Labs: 3/21/24  PSA Screening (starting at age 55 till 69):   Colonoscopy (45-75 or age 40 with 1st degree relative dx colon ca): states he had one 5-6 years ago  ECHO 3/3/2023 showed LVSF 40-45%, septal motion consistent with postOp status, abnormal pattern of LVDF, low normal RVSF, mild AV stenosis. Mild MR, TR. PAP mildly elevated.   CT Lung done 10/19/2022 - moderate atherosclerotic calcifications, left kidney stone, borderline ascending aortic aneurysm measuring 4.0cm.   - last saw Dr. Baer on 3/21/24, no changes made  - vascular US upper on 06/11/24 revealed: Right Upper PVR: Baseline indices > 0.80 and waveforms appear normal.  Left Upper PVR: Baseline indices > 0.80 and waveforms appear normal.  - last saw cardiology, Dr Bhakta on 8/16/24, BP elevated, Spironolactone was resumed for Goal SBP < 140    HPI Patel Goncalves \"Tatyana\" is a 74 y.o. male with PMH remarkable for CAD s/p CABG x4 on 11/28/2022 with Dr Ewelina Trujillo, DM2, Gout, HTN, CKD who presents to the office today for Follow-up (4 month).    Social History " "    Tobacco Use    Smoking status: Never    Smokeless tobacco: Never   Vaping Use    Vaping status: Never Used   Substance Use Topics    Alcohol use: Yes     Comment: Occasional    Drug use: Not Currently     Review of Systems   Constitutional: Negative.    HENT: Negative.     Respiratory: Negative.     Cardiovascular: Negative.    Gastrointestinal: Negative.    Genitourinary: Negative.    Musculoskeletal:         + leg cramps   Skin: Negative.    Neurological: Negative.    Psychiatric/Behavioral: Negative.       Visit Vitals  /81 (BP Location: Left arm, Patient Position: Sitting)   Pulse 63   Resp 18   Ht 1.753 m (5' 9\")   Wt 89.8 kg (198 lb)   SpO2 93%   BMI 29.24 kg/m²   Smoking Status Never   BSA 2.09 m²     Physical Exam  Vitals reviewed.   Constitutional:       Appearance: Normal appearance.   HENT:      Head: Normocephalic and atraumatic.   Cardiovascular:      Rate and Rhythm: Normal rate and regular rhythm.      Pulses: Normal pulses.      Heart sounds: Normal heart sounds.   Pulmonary:      Effort: Pulmonary effort is normal.      Breath sounds: Normal breath sounds.   Abdominal:      General: Bowel sounds are normal.      Palpations: Abdomen is soft.   Musculoskeletal:         General: Normal range of motion.   Skin:     General: Skin is warm and dry.   Neurological:      General: No focal deficit present.      Mental Status: He is alert and oriented to person, place, and time.   Psychiatric:         Mood and Affect: Mood normal.         Behavior: Behavior normal.         Current Outpatient Medications   Medication Instructions    amLODIPine (NORVASC) 10 mg, oral, Daily    aspirin 81 mg chewable tablet Daily    atorvastatin (LIPITOR) 80 mg, oral, Nightly    clopidogrel (PLAVIX) 75 mg, oral, Daily    dapagliflozin propanediol (FARXIGA) 10 mg, oral, Daily    flash glucose sensor kit (FreeStyle Evy 2 Sensor) kit USE AS DIRECTED    FreeStyle Evy 2 Sensor kit REPLACE SENSOR EVERY 14 DAYS    FreeStyle " "Evy reader (FreeStyle Evy 2 [place]) misc Use as instructed    furosemide (LASIX) 40 mg, oral, Daily    insulin glargine (Lantus Solostar U-100 Insulin) 100 unit/mL (3 mL) pen INJECT 35 UNITS SUBCUTANEOUSLY AT BEDTIME    insulin lispro (HumaLOG) 100 unit/mL injection INJECT 8 UNITS SUBCUTANEOUSLY 3 TIMES DAILY PLUS SLIDING SCALE. 151-200, 2 UNITS. 201-250, 4 UNITS. 251-300, 6 UNITS. 301-350, 8 UNITS. 351-400, 10 UNITS. MAX MEAL DOSE 18 UNITS. MAX DAILY 54 UNITS    lisinopril 40 mg, oral, Daily, for blood pressure    metoprolol succinate XL (TOPROL-XL) 200 mg, oral, Daily    multivitamin tablet 1 tablet, Daily    pen needle, diabetic 31 gauge x 5/16\" needle USE 1 NEW PEN NEEDLE 4 TIMES DAILY    spironolactone (ALDACTONE) 25 mg, oral, Daily        Lab Results   Component Value Date    WBC 7.8 03/21/2024    HGB 16.5 03/21/2024    HCT 50.8 03/21/2024     03/21/2024    CHOL 86 07/18/2023    TRIG 155 (H) 07/18/2023    HDL 36.2 (A) 07/18/2023    ALT 32 08/14/2023    AST 24 08/14/2023     03/21/2024    K 4.0 03/21/2024    CL 99 03/21/2024    CREATININE 1.33 (H) 03/21/2024    BUN 24 (H) 03/21/2024    CO2 30 03/21/2024    TSH 1.81 10/20/2022    INR 1.2 (H) 10/18/2022    HGBA1C 9.2 (A) 06/27/2024     Problem List Items Addressed This Visit             ICD-10-CM    Diabetes mellitus, type II (Multi) E11.9    Relevant Orders    POCT glycosylated hemoglobin (Hb A1C) manually resulted (Completed)    Uncontrolled hypertension I10    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 pectoris I25.119    Relevant Medications    tadalafil (Cialis) 5 mg tablet    S/P CABG (coronary artery bypass graft) Z95.1    Other hyperlipidemia E78.49    CKD (chronic kidney disease) N18.9    Relevant Orders    Basic metabolic panel    Atrial fibrillation (Multi) I48.91    Relevant Medications    tadalafil (Cialis) 5 mg tablet    Leg cramps R25.2     Advised to try OTC magnesium          Other Visit " Diagnoses         Codes    Erectile dysfunction, unspecified erectile dysfunction type     N52.9    Relevant Medications    tadalafil (Cialis) 5 mg tablet          A1c improved today, 8.4, no medication changes  Spironolactone was resumed per cardiology in addition to his other BP medications for uncontrolled BP, we will get BMP to check potassium, kidney function, order inputted  Advised to Monitor BP at home 2-3 times per week and call us with results after 3 weeks, goal SBP < 140  Add cialis for ED, potential side effects discussed with patient  Advised OTC magnesium for leg cramping  Follow up in four months      --------------------  Written by Maxine De Leon LPN, acting as a scribe for Dr. Hidalgo. This note accurately reflects the work and decisions made by Dr. Hidalgo.     I, Dr. Hidalgo, attest all medical record entries made by the scribe were under my direction and were personally dictated by me. I have reviewed the chart and agree that the record accurately reflects my performance of the history, physical exam, and assessment and plan.

## 2024-11-08 PROBLEM — R25.2 LEG CRAMPS: Status: ACTIVE | Noted: 2024-11-08

## 2024-11-08 ASSESSMENT — ENCOUNTER SYMPTOMS
RESPIRATORY NEGATIVE: 1
NEUROLOGICAL NEGATIVE: 1
CARDIOVASCULAR NEGATIVE: 1
PSYCHIATRIC NEGATIVE: 1
GASTROINTESTINAL NEGATIVE: 1
CONSTITUTIONAL NEGATIVE: 1

## 2024-11-08 NOTE — PATIENT INSTRUCTIONS
It was great to see you in the office today! Here is what we discussed at your visit today:  Please continue to take your current medications   Please get bloodwork drawn as soon as you are able. We will call you with results.  Monitor your BP at home 2-3 times per week and call us with results after 3 weeks  A prescription was sent for Cialis as dicussed, call office if any issues with this medication  Follow up in four months

## 2024-11-10 DIAGNOSIS — E11.9 TYPE 2 DIABETES MELLITUS WITHOUT COMPLICATION, WITH LONG-TERM CURRENT USE OF INSULIN (MULTI): Primary | ICD-10-CM

## 2024-11-10 DIAGNOSIS — N52.9 ERECTILE DYSFUNCTION, UNSPECIFIED ERECTILE DYSFUNCTION TYPE: ICD-10-CM

## 2024-11-10 DIAGNOSIS — Z79.4 TYPE 2 DIABETES MELLITUS WITHOUT COMPLICATION, WITH LONG-TERM CURRENT USE OF INSULIN (MULTI): Primary | ICD-10-CM

## 2024-11-11 ENCOUNTER — LAB (OUTPATIENT)
Dept: LAB | Facility: LAB | Age: 74
End: 2024-11-11
Payer: MEDICARE

## 2024-11-11 DIAGNOSIS — N18.9 CHRONIC KIDNEY DISEASE, UNSPECIFIED CKD STAGE: ICD-10-CM

## 2024-11-11 LAB
ANION GAP SERPL CALC-SCNC: 11 MMOL/L (ref 10–20)
BUN SERPL-MCNC: 29 MG/DL (ref 6–23)
CALCIUM SERPL-MCNC: 9.5 MG/DL (ref 8.6–10.3)
CHLORIDE SERPL-SCNC: 103 MMOL/L (ref 98–107)
CO2 SERPL-SCNC: 28 MMOL/L (ref 21–32)
CREAT SERPL-MCNC: 1.59 MG/DL (ref 0.5–1.3)
EGFRCR SERPLBLD CKD-EPI 2021: 45 ML/MIN/1.73M*2
GLUCOSE SERPL-MCNC: 296 MG/DL (ref 74–99)
POTASSIUM SERPL-SCNC: 4.4 MMOL/L (ref 3.5–5.3)
SODIUM SERPL-SCNC: 138 MMOL/L (ref 136–145)

## 2024-11-11 PROCEDURE — 36415 COLL VENOUS BLD VENIPUNCTURE: CPT

## 2024-11-11 RX ORDER — INSULIN GLARGINE-YFGN 100 [IU]/ML
INJECTION, SOLUTION SUBCUTANEOUS
Qty: 30 ML | Refills: 0 | Status: SHIPPED | OUTPATIENT
Start: 2024-11-11

## 2024-11-12 DIAGNOSIS — R79.9 ELEVATED BUN: Primary | ICD-10-CM

## 2024-11-14 RX ORDER — TADALAFIL 5 MG/1
5 TABLET ORAL DAILY
Qty: 10 TABLET | Refills: 0 | Status: SHIPPED | OUTPATIENT
Start: 2024-11-14 | End: 2024-12-14

## 2024-11-25 DIAGNOSIS — N52.9 ERECTILE DYSFUNCTION, UNSPECIFIED ERECTILE DYSFUNCTION TYPE: ICD-10-CM

## 2024-11-25 RX ORDER — TADALAFIL 5 MG/1
5 TABLET ORAL DAILY
Qty: 30 TABLET | Refills: 0 | Status: SHIPPED | OUTPATIENT
Start: 2024-11-25 | End: 2024-12-25

## 2024-12-07 DIAGNOSIS — E11.69 TYPE 2 DIABETES MELLITUS WITH OTHER SPECIFIED COMPLICATION, UNSPECIFIED WHETHER LONG TERM INSULIN USE (MULTI): ICD-10-CM

## 2024-12-09 RX ORDER — FLASH GLUCOSE SENSOR
KIT MISCELLANEOUS
Qty: 2 EACH | Refills: 0 | Status: SHIPPED | OUTPATIENT
Start: 2024-12-09

## 2024-12-17 DIAGNOSIS — I25.810 CORONARY ARTERY DISEASE INVOLVING CORONARY BYPASS GRAFT OF NATIVE HEART, UNSPECIFIED WHETHER ANGINA PRESENT: ICD-10-CM

## 2024-12-17 RX ORDER — CLOPIDOGREL BISULFATE 75 MG/1
75 TABLET ORAL DAILY
Qty: 90 TABLET | Refills: 0 | Status: SHIPPED | OUTPATIENT
Start: 2024-12-17

## 2024-12-19 DIAGNOSIS — N52.9 ERECTILE DYSFUNCTION, UNSPECIFIED ERECTILE DYSFUNCTION TYPE: ICD-10-CM

## 2024-12-19 RX ORDER — TADALAFIL 5 MG/1
5 TABLET ORAL DAILY
Qty: 30 TABLET | Refills: 0 | Status: SHIPPED | OUTPATIENT
Start: 2024-12-19 | End: 2025-01-18

## 2024-12-30 DIAGNOSIS — I25.810 CORONARY ARTERY DISEASE INVOLVING CORONARY BYPASS GRAFT OF NATIVE HEART, UNSPECIFIED WHETHER ANGINA PRESENT: ICD-10-CM

## 2024-12-30 RX ORDER — METOPROLOL SUCCINATE 200 MG/1
200 TABLET, EXTENDED RELEASE ORAL DAILY
Qty: 90 TABLET | Refills: 0 | Status: SHIPPED | OUTPATIENT
Start: 2024-12-30

## 2025-01-02 DIAGNOSIS — E11.69 TYPE 2 DIABETES MELLITUS WITH OTHER SPECIFIED COMPLICATION, UNSPECIFIED WHETHER LONG TERM INSULIN USE (MULTI): ICD-10-CM

## 2025-01-02 RX ORDER — FLASH GLUCOSE SENSOR
KIT MISCELLANEOUS
Qty: 2 EACH | Refills: 0 | Status: SHIPPED | OUTPATIENT
Start: 2025-01-02

## 2025-01-05 DIAGNOSIS — I25.810 CORONARY ARTERY DISEASE INVOLVING CORONARY BYPASS GRAFT OF NATIVE HEART, UNSPECIFIED WHETHER ANGINA PRESENT: ICD-10-CM

## 2025-01-06 RX ORDER — ATORVASTATIN CALCIUM 80 MG/1
80 TABLET, FILM COATED ORAL NIGHTLY
Qty: 90 TABLET | Refills: 0 | Status: SHIPPED | OUTPATIENT
Start: 2025-01-06

## 2025-01-09 ENCOUNTER — APPOINTMENT (OUTPATIENT)
Dept: NEUROLOGY | Facility: CLINIC | Age: 75
End: 2025-01-09
Payer: MEDICARE

## 2025-01-09 DIAGNOSIS — G56.22 CUBITAL TUNNEL SYNDROME ON LEFT: ICD-10-CM

## 2025-01-09 DIAGNOSIS — G56.03 BILATERAL CARPAL TUNNEL SYNDROME: ICD-10-CM

## 2025-01-09 DIAGNOSIS — G56.02 CARPAL TUNNEL SYNDROME ON LEFT: ICD-10-CM

## 2025-01-09 DIAGNOSIS — M54.12 CERVICAL RADICULOPATHY: ICD-10-CM

## 2025-01-09 PROCEDURE — 95910 NRV CNDJ TEST 7-8 STUDIES: CPT | Performed by: STUDENT IN AN ORGANIZED HEALTH CARE EDUCATION/TRAINING PROGRAM

## 2025-01-09 PROCEDURE — 95886 MUSC TEST DONE W/N TEST COMP: CPT | Performed by: STUDENT IN AN ORGANIZED HEALTH CARE EDUCATION/TRAINING PROGRAM

## 2025-01-14 ENCOUNTER — OFFICE VISIT (OUTPATIENT)
Dept: ORTHOPEDIC SURGERY | Facility: CLINIC | Age: 75
End: 2025-01-14
Payer: MEDICARE

## 2025-01-14 DIAGNOSIS — G56.02 CARPAL TUNNEL SYNDROME ON LEFT: Primary | ICD-10-CM

## 2025-01-14 PROCEDURE — 99213 OFFICE O/P EST LOW 20 MIN: CPT | Performed by: ORTHOPAEDIC SURGERY

## 2025-01-14 PROCEDURE — 1160F RVW MEDS BY RX/DR IN RCRD: CPT | Performed by: ORTHOPAEDIC SURGERY

## 2025-01-14 PROCEDURE — 4010F ACE/ARB THERAPY RXD/TAKEN: CPT | Performed by: ORTHOPAEDIC SURGERY

## 2025-01-14 PROCEDURE — 1036F TOBACCO NON-USER: CPT | Performed by: ORTHOPAEDIC SURGERY

## 2025-01-14 PROCEDURE — 1157F ADVNC CARE PLAN IN RCRD: CPT | Performed by: ORTHOPAEDIC SURGERY

## 2025-01-14 PROCEDURE — 1159F MED LIST DOCD IN RCRD: CPT | Performed by: ORTHOPAEDIC SURGERY

## 2025-01-14 RX ORDER — CEFAZOLIN SODIUM 2 G/100ML
2 INJECTION, SOLUTION INTRAVENOUS ONCE
OUTPATIENT
Start: 2025-01-14 | End: 2025-01-14

## 2025-01-14 ASSESSMENT — ENCOUNTER SYMPTOMS
ARTHRALGIAS: 1
CHILLS: 0
SHORTNESS OF BREATH: 0
FATIGUE: 0
SINUS PAIN: 0
FEVER: 0
SORE THROAT: 0
WOUND: 0
WHEEZING: 0

## 2025-01-14 ASSESSMENT — PAIN - FUNCTIONAL ASSESSMENT: PAIN_FUNCTIONAL_ASSESSMENT: 0-10

## 2025-01-14 ASSESSMENT — PAIN SCALES - GENERAL: PAINLEVEL_OUTOF10: 0 - NO PAIN

## 2025-01-14 NOTE — PROGRESS NOTES
Reason for Appointment  Chief Complaint   Patient presents with    Left Shoulder - Results     History of Present Illness  Patient is a 74 y.o. male here today for follow-up evaluation of continued left hand numbness.  Recent EMG is reviewed with the report and shows moderate to severe left median neuropathy with possible left ulnar neuropathy.  He has significant arthritis in the hands with flexion contractures.  He states he does have numbness in all the fingers but no weakness with  strength.  He would like to discuss operative treatment today.  No other changes in his past medical history, allergies, or medications.    Past Medical History:   Diagnosis Date    Bitten or stung by nonvenomous insect and other nonvenomous arthropods, initial encounter 10/21/2019    Tick bite    Cutaneous abscess of left upper limb 06/03/2015    Abscess of arm, left    Disorder of the skin and subcutaneous tissue, unspecified 07/30/2020    Changing skin lesion    Essential hypertension 05/19/2010    Fatigue 06/27/2024    Personal history of other diseases of urinary system 01/28/2019    History of hematuria    Personal history of other specified conditions 04/16/2019    History of chronic fatigue    Prepatellar bursitis, unspecified knee 10/30/2018    Prepatellar bursitis    Renal glomerular disease 07/13/2023    Unspecified renal colic 01/28/2019    Kidney pain    Urethral stricture 06/27/2024       Past Surgical History:   Procedure Laterality Date    CARDIAC SURGERY      bypass       Medication Documentation Review Audit       Reviewed by Regi Hurt MA (Medical Assistant) on 01/14/25 at 1007      Medication Order Taking? Sig Documenting Provider Last Dose Status   amLODIPine (Norvasc) 10 mg tablet [id number] Yes Take 1 tablet (10 mg) by mouth once daily. Jose Antonio Maynard MD  Active   aspirin 81 mg chewable tablet [id number] Yes Chew once daily. Historical Provider, MD  Active   atorvastatin (Lipitor) 80 mg tablet [id number]  "Yes TAKE 1 TABLET BY MOUTH ONCE DAILY AT BEDTIME Mike Moran MD  Active   clopidogrel (Plavix) 75 mg tablet [id number] Yes Take 1 tablet by mouth once daily Mike Moran MD  Active   dapagliflozin propanediol (Farxiga) 10 mg [id number] Yes Take 1 tablet (10 mg) by mouth once daily. Mike Moran MD  Active   flash glucose sensor kit (FreeStyle Evy 2 Sensor) kit [id number] Yes USE AS DIRECTED Mike Moran MD  Active   FreeStyle Evy 2 Sensor kit [id number] Yes REPLACE SENSOR EVERY 14 DAYS Mike Moran MD  Active   FreeStyle Evy 2 Sensor kit [id number] Yes REPLACE SENSOR EVERY 14 DAYS Mike Moran MD  Active   FreeStyle Evy 2 Sensor kit [id number] Yes USE TO CHECK GLUCOSE AS NEEDED Mike Moran MD  Active   FreeStyle Evy reader (FreeStyle Evy 2 [place]) misc [id number] Yes Use as instructed Ghanshyam Burotn MD  Active   furosemide (Lasix) 40 mg tablet [id number] Yes Take 1 tablet (40 mg) by mouth once daily. Jose Antonio Maynard MD  Active   insulin lispro (HumaLOG) 100 unit/mL injection [id number] Yes INJECT 8 UNITS SUBCUTANEOUSLY 3 TIMES DAILY PLUS SLIDING SCALE. 151-200, 2 UNITS. 201-250, 4 UNITS. 251-300, 6 UNITS. 301-350, 8 UNITS. 351-400, 10 UNITS. MAX MEAL DOSE 18 UNITS. MAX DAILY 54 UNITS Mike Moran MD  Active   lisinopril 40 mg tablet [id number] Yes Take 1 tablet (40 mg) by mouth once daily. for blood pressure Historical Provider, MD  Active   metoprolol succinate XL (Toprol-XL) 200 mg 24 hr tablet [id number] Yes Take 1 tablet by mouth once daily Mike Moran MD  Active   multivitamin tablet [id number] Yes Take 1 tablet by mouth once daily. Historical Provider, MD  Active   pen needle, diabetic 31 gauge x 5/16\" needle [id number] Yes USE 1 NEW PEN NEEDLE 4 TIMES DAILY Mike Moran MD  Active   Semglee,insulin glarg-yfgn,Pen 100 unit/mL (3 mL) Pen [id number] Yes INJECT 35 UNITS SUBCUTANEOUSLY AT BEDTIME Mike Moran MD  Active   spironolactone (Aldactone) 25 mg tablet " [id number] Yes Take 1 tablet (25 mg) by mouth once daily. Jose Antonio Maynard MD  Active   tadalafil (Cialis) 5 mg tablet [id number] Yes Take 1 tablet (5 mg) by mouth once daily. Take one tablet and if not effective, ok to increase to 10mg prn Mike Moran MD  Active                    Allergies   Allergen Reactions    Bee Venom Protein (Honey Bee) Hives and Itching     Hives from Hornets    Naproxen Sodium Itching       Review of Systems   Constitutional:  Negative for chills, fatigue and fever.   HENT:  Negative for nosebleeds, sinus pain and sore throat.    Respiratory:  Negative for shortness of breath and wheezing.    Cardiovascular:  Negative for chest pain and leg swelling.   Musculoskeletal:  Positive for arthralgias.   Skin:  Negative for pallor and wound.     Exam   On exam the left hand shows significant DJD but no severe atrophy.  He does have a mildly positive Tinel's over the left median nerve, negative Tinel's over the left ulnar nerve.  Baseline flexion contractures in the long and ring fingers.  Good pulses and decree sensation to light touch in mostly the median nerve distribution.    Assessment   Encounter Diagnosis   Name Primary?    Carpal tunnel syndrome on left Yes       Plan   He is not having any significant ulnar nerve symptoms and at his age, most conservative treatment option would be a simple carpal tunnel release.  He understands the risks of surgery including nerve, artery, and tendon damage, infection, continued pain, and need for future surgery and that he can see slow improvement in terms of nerve recovery for months postoperatively.  He will call and schedule a left carpal tunnel release.    Naty NAVARRO PA-C, am acting as a scribe and attest that this documentation has been prepared under the direction and in the presence of Esteban Palumbo MD.    By signing below, Esteban NAVARRO MD, personally performed the services described in this documentation. All medical record entries  made by the scribe were at my direction and in my presence. I have reviewed the chart and agree that the record reflects my personal performance and is accurate and complete.

## 2025-01-17 PROBLEM — G56.02 CARPAL TUNNEL SYNDROME ON LEFT: Status: ACTIVE | Noted: 2025-01-14

## 2025-01-21 ENCOUNTER — PREP FOR PROCEDURE (OUTPATIENT)
Dept: ORTHOPEDIC SURGERY | Facility: CLINIC | Age: 75
End: 2025-01-21
Payer: MEDICARE

## 2025-01-21 DIAGNOSIS — G56.02 CARPAL TUNNEL SYNDROME ON LEFT: Primary | ICD-10-CM

## 2025-01-25 DIAGNOSIS — E11.69 TYPE 2 DIABETES MELLITUS WITH OTHER SPECIFIED COMPLICATION, UNSPECIFIED WHETHER LONG TERM INSULIN USE (MULTI): ICD-10-CM

## 2025-01-27 ENCOUNTER — OFFICE VISIT (OUTPATIENT)
Dept: CARDIOLOGY | Facility: HOSPITAL | Age: 75
End: 2025-01-27
Payer: MEDICARE

## 2025-01-27 VITALS
HEART RATE: 68 BPM | DIASTOLIC BLOOD PRESSURE: 83 MMHG | WEIGHT: 200.18 LBS | SYSTOLIC BLOOD PRESSURE: 139 MMHG | BODY MASS INDEX: 29.56 KG/M2 | OXYGEN SATURATION: 94 %

## 2025-01-27 DIAGNOSIS — I10 UNCONTROLLED HYPERTENSION: Primary | ICD-10-CM

## 2025-01-27 DIAGNOSIS — I48.91 ATRIAL FIBRILLATION, UNSPECIFIED TYPE (MULTI): ICD-10-CM

## 2025-01-27 DIAGNOSIS — Z01.818 PRE-OP EVALUATION: ICD-10-CM

## 2025-01-27 DIAGNOSIS — Z95.1 S/P CABG (CORONARY ARTERY BYPASS GRAFT): ICD-10-CM

## 2025-01-27 DIAGNOSIS — I25.119 CORONARY ARTERY DISEASE INVOLVING NATIVE CORONARY ARTERY OF NATIVE HEART WITH ANGINA PECTORIS: ICD-10-CM

## 2025-01-27 DIAGNOSIS — E78.49 OTHER HYPERLIPIDEMIA: ICD-10-CM

## 2025-01-27 LAB
ATRIAL RATE: 64 BPM
P AXIS: 58 DEGREES
P OFFSET: 145 MS
P ONSET: 117 MS
PR INTERVAL: 192 MS
Q ONSET: 213 MS
QRS COUNT: 11 BEATS
QRS DURATION: 156 MS
QT INTERVAL: 456 MS
QTC CALCULATION(BAZETT): 470 MS
QTC FREDERICIA: 465 MS
R AXIS: -20 DEGREES
T AXIS: 121 DEGREES
T OFFSET: 441 MS
VENTRICULAR RATE: 64 BPM

## 2025-01-27 PROCEDURE — 1159F MED LIST DOCD IN RCRD: CPT | Performed by: PHYSICIAN ASSISTANT

## 2025-01-27 PROCEDURE — 3075F SYST BP GE 130 - 139MM HG: CPT | Performed by: PHYSICIAN ASSISTANT

## 2025-01-27 PROCEDURE — 4010F ACE/ARB THERAPY RXD/TAKEN: CPT | Performed by: PHYSICIAN ASSISTANT

## 2025-01-27 PROCEDURE — 3079F DIAST BP 80-89 MM HG: CPT | Performed by: PHYSICIAN ASSISTANT

## 2025-01-27 PROCEDURE — 99214 OFFICE O/P EST MOD 30 MIN: CPT | Performed by: PHYSICIAN ASSISTANT

## 2025-01-27 PROCEDURE — 1036F TOBACCO NON-USER: CPT | Performed by: PHYSICIAN ASSISTANT

## 2025-01-27 PROCEDURE — 1157F ADVNC CARE PLAN IN RCRD: CPT | Performed by: PHYSICIAN ASSISTANT

## 2025-01-27 PROCEDURE — 93005 ELECTROCARDIOGRAM TRACING: CPT | Performed by: PHYSICIAN ASSISTANT

## 2025-01-27 RX ORDER — FLASH GLUCOSE SENSOR
KIT MISCELLANEOUS
Qty: 2 EACH | Refills: 0 | Status: SHIPPED | OUTPATIENT
Start: 2025-01-27 | End: 2025-01-28 | Stop reason: SDUPTHER

## 2025-01-27 NOTE — PROGRESS NOTES
"Patient ID:   Patel Goncalves \"Tatyana\" is a 74 y.o. male with PMH remarkable for CAD s/p CABG x4 on 11/28/2022 with Dr Ewelina Trujillo, DM2, Gout, HTN, CKD who presents for Pre-op Exam and Earache (Left ).    PreOp Clearance for upcoming procedure:  Date of surgery: 2/7/2025  Type of surgery: left medial nerve decompression  Surgeon performing: Dr Palumbo  Labs/tests: HgbA1c 8.4  Previous stress test and/or Echo: ECHO 3/3/2023 showed LVSF 40-45%, septal motion consistent with postOp status, abnormal pattern of LVDF, low normal RVSF, mild AV stenosis. Mild MR, TR. PAP mildly elevated.   Smoking status: never  History of DVT/PE: no  Prior anesthesia: yes  Blood thinners: plavix    Past Medical History:   Diagnosis Date    Bitten or stung by nonvenomous insect and other nonvenomous arthropods, initial encounter 10/21/2019    Tick bite    Cutaneous abscess of left upper limb 06/03/2015    Abscess of arm, left    Disorder of the skin and subcutaneous tissue, unspecified 07/30/2020    Changing skin lesion    Essential hypertension 05/19/2010    Fatigue 06/27/2024    Personal history of other diseases of urinary system 01/28/2019    History of hematuria    Personal history of other specified conditions 04/16/2019    History of chronic fatigue    Prepatellar bursitis, unspecified knee 10/30/2018    Prepatellar bursitis    Renal glomerular disease 07/13/2023    Unspecified renal colic 01/28/2019    Kidney pain    Urethral stricture 06/27/2024      Past Surgical History:   Procedure Laterality Date    CARDIAC SURGERY      bypass    TRIGGER FINGER RELEASE Left 2024      Social History     Tobacco Use    Smoking status: Never    Smokeless tobacco: Never   Vaping Use    Vaping status: Never Used   Substance Use Topics    Alcohol use: Yes     Comment: Occasional    Drug use: Not Currently     Family History   Problem Relation Name Age of Onset    Aortic aneurysm Brother      Diabetes Maternal Grandmother        Immunization History " "  Administered Date(s) Administered    Moderna SARS-CoV-2 Vaccination 03/29/2021, 04/29/2021     Review of Systems   Constitutional: Negative.    HENT:  Positive for ear pain.    Eyes: Negative.    Respiratory: Negative.     Cardiovascular: Negative.    Gastrointestinal: Negative.    Endocrine: Negative.    Genitourinary: Negative.    Musculoskeletal: Negative.    Skin: Negative.    Neurological: Negative.    Psychiatric/Behavioral: Negative.     All other systems reviewed and are negative.    Allergies   Allergen Reactions    Bee Venom Protein (Honey Bee) Hives and Itching     Hives from Hornets    Naproxen Sodium Itching     Visit Vitals  /80   Pulse 63   Resp 18   Ht 1.753 m (5' 9\")   Wt 90.9 kg (200 lb 6.4 oz)   SpO2 94%   BMI 29.59 kg/m²   Smoking Status Never   BSA 2.1 m²     Physical Exam  Vitals reviewed. Exam conducted with a chaperone present.   Constitutional:       Appearance: Normal appearance. He is well-developed.   HENT:      Head: Normocephalic.      Right Ear: External ear normal.      Left Ear: External ear normal. Swelling and tenderness present. A middle ear effusion is present.      Nose: Nose normal.      Mouth/Throat:      Lips: Pink.      Mouth: Mucous membranes are moist.   Eyes:      General: Lids are normal.      Pupils: Pupils are equal, round, and reactive to light.   Neck:      Trachea: Trachea normal.   Cardiovascular:      Rate and Rhythm: Normal rate and regular rhythm.      Heart sounds: Normal heart sounds.   Pulmonary:      Effort: Pulmonary effort is normal.      Breath sounds: Normal breath sounds.   Abdominal:      General: Bowel sounds are normal.      Palpations: Abdomen is soft.   Musculoskeletal:      Cervical back: Full passive range of motion without pain.   Skin:     General: Skin is warm and moist.   Neurological:      General: No focal deficit present.      Mental Status: He is alert and oriented to person, place, and time. Mental status is at baseline. " "  Psychiatric:         Attention and Perception: Attention normal.         Mood and Affect: Mood normal.         Speech: Speech normal.         Behavior: Behavior is cooperative.         Thought Content: Thought content normal.         Cognition and Memory: Cognition normal.         Judgment: Judgment normal.       Current Outpatient Medications   Medication Instructions    amLODIPine (NORVASC) 10 mg, oral, Daily    aspirin 81 mg chewable tablet Daily    atorvastatin (LIPITOR) 80 mg, oral, Nightly    ciprofloxacin-dexamethasone (CiproDEX) otic suspension 4 drops, Left Ear, 2 times daily    clopidogrel (PLAVIX) 75 mg, oral, Daily    dapagliflozin propanediol (FARXIGA) 10 mg, oral, Daily    flash glucose sensor kit (FreeStyle Evy 2 Sensor) kit USE AS DIRECTED    furosemide (LASIX) 40 mg, oral, Daily    insulin lispro (HumaLOG) 100 unit/mL injection INJECT 8 UNITS SUBCUTANEOUSLY 3 TIMES DAILY PLUS SLIDING SCALE. 151-200, 2 UNITS. 201-250, 4 UNITS. 251-300, 6 UNITS. 301-350, 8 UNITS. 351-400, 10 UNITS. MAX MEAL DOSE 18 UNITS. MAX DAILY 54 UNITS    lisinopril 40 mg, Daily    metoprolol succinate XL (TOPROL-XL) 200 mg, oral, Daily    multivitamin tablet 1 tablet, Daily    pen needle, diabetic 31 gauge x 5/16\" needle USE 1 NEW PEN NEEDLE 4 TIMES DAILY    Semglee,insulin glarg-yfgn,Pen 100 unit/mL (3 mL) Pen INJECT 35 UNITS SUBCUTANEOUSLY AT BEDTIME    spironolactone (ALDACTONE) 25 mg, oral, Daily    tadalafil (CIALIS) 5 mg, oral, Daily, Take one tablet and if not effective, ok to increase to 10mg prn     Lab Results   Component Value Date    WBC 7.8 03/21/2024    HGB 16.5 03/21/2024    HCT 50.8 03/21/2024     03/21/2024    CHOL 86 07/18/2023    TRIG 155 (H) 07/18/2023    HDL 36.2 (A) 07/18/2023    ALT 32 08/14/2023    AST 24 08/14/2023     11/11/2024    K 4.4 11/11/2024     11/11/2024    CREATININE 1.59 (H) 11/11/2024    BUN 29 (H) 11/11/2024    CO2 28 11/11/2024    TSH 1.81 10/20/2022    INR 1.2 (H) " 10/18/2022    HGBA1C 8.4 (A) 11/06/2024       Problem List Items Addressed This Visit             ICD-10-CM    Diabetes mellitus, type II (Multi) E11.9     - c/w humalog 8u TID + SSI  - c/w lantus 35u at bedtime  - last HgbA1c was 8.4 on 11/6/2024  - Patient educated about hypoglycemia and hyperglycemia  - advised 1800 ADA diet  - Education and counseling was done  - Annual Diabetic retinopathy exam  - Annual Podiatry foot exam and self check every day.  - Regular exercise is encouraged  - Regular home monitoring of the blood sugar is advised  - Advised to take the medication as advised and compliance as needed.         S/P CABG (coronary artery bypass graft) Z95.1     - c/w statin and plavix, baby ASA  - c/w amlodipine, lasix, metoprolol, lisinopril, aldactone  Visit Vitals  /80   Pulse 63   Resp 18    - All strategies to keep the blood pressure down is explained to the patient  - Regular exercise and blood pressure monitoring is encouraged         Preop exam for internal medicine Z01.818     Date of surgery: 2/7/2025  Type of surgery: left medial nerve decompression  Surgeon performing: Dr Palumbo  Labs/tests: HgbA1c 8.4  Previous stress test and/or Echo: ECHO 3/3/2023 showed LVSF 40-45%, septal motion consistent with postOp status, abnormal pattern of LVDF, low normal RVSF, mild AV stenosis. Mild MR, TR. PAP mildly elevated.   Smoking status: never  History of DVT/PE: no  Prior anesthesia: yes  Blood thinners: plavix         Erectile dysfunction N52.9     - ok to refill cialis PRN         Relevant Medications    tadalafil (Cialis) 5 mg tablet     Other Visit Diagnoses         Codes    Ear infection     H66.90    Relevant Medications    ciprofloxacin-dexamethasone (CiproDEX) otic suspension            Medical Clearance has been granted. Paperwork will be sent to the surgeon's office.    ---------------  Written by Melody De Leon RN, acting as a scribe for Dr. Hidalgo. This note accurately reflects the work  and decisions made by Dr. Hidalgo.     I, Dr. Hidalgo, attest all medical record entries made by the scribe were under my direction and were personally dictated by me. I have reviewed the chart and agree that the record accurately reflects my performance of the history, physical exam, and assessment and plan.

## 2025-01-27 NOTE — PROGRESS NOTES
Chief Complaint:   No chief complaint on file.     History Of Present Illness:    Tatyana Goncalves is a 74 y.o. male presenting with for preoperative cardiovascular evaluation prior to left carpal tunnel surgery.  Patient reports that he is doing well.  He is active at baseline, walks his dog is able to complete yard work and all household chores without any issue.  Is able to walk up and down stairs without any chest pain, chest pressure, or shortness of breath.  Reports that he is able to plow his driveway with a snowblower, shovel the walkway.  Walks more than 2 miles with his dog through the snow without any limitations or symptoms.     Last Recorded Vitals:  Vitals:    01/27/25 0936   BP: 139/83   Pulse: 68   SpO2: 94%   Weight: 90.8 kg (200 lb 2.8 oz)       Past Medical History:  He has a past medical history of Bitten or stung by nonvenomous insect and other nonvenomous arthropods, initial encounter (10/21/2019), Cutaneous abscess of left upper limb (06/03/2015), Disorder of the skin and subcutaneous tissue, unspecified (07/30/2020), Essential hypertension (05/19/2010), Fatigue (06/27/2024), Personal history of other diseases of urinary system (01/28/2019), Personal history of other specified conditions (04/16/2019), Prepatellar bursitis, unspecified knee (10/30/2018), Renal glomerular disease (07/13/2023), Unspecified renal colic (01/28/2019), and Urethral stricture (06/27/2024).    Past Surgical History:  He has a past surgical history that includes Cardiac surgery.      Social History:  He reports that he has never smoked. He has never used smokeless tobacco. He reports current alcohol use. He reports that he does not currently use drugs.    Family History:  Family History   Problem Relation Name Age of Onset    Aortic aneurysm Brother      Diabetes Maternal Grandmother          Allergies:  Bee venom protein (honey bee) and Naproxen sodium    Outpatient Medications:  Current Outpatient Medications  "  Medication Instructions    amLODIPine (NORVASC) 10 mg, oral, Daily    aspirin 81 mg chewable tablet Daily    atorvastatin (LIPITOR) 80 mg, oral, Nightly    clopidogrel (PLAVIX) 75 mg, oral, Daily    dapagliflozin propanediol (FARXIGA) 10 mg, oral, Daily    flash glucose sensor kit (FreeStyle Evy 2 Sensor) kit USE AS DIRECTED    flash glucose sensor kit (FreeStyle Evy 2 Sensor) kit USE AS DIRECTED    FreeStyle Evy 2 Sensor kit REPLACE SENSOR EVERY 14 DAYS    FreeStyle Evy 2 Sensor kit REPLACE SENSOR EVERY 14 DAYS    FreeStyle Evy 2 Sensor kit USE TO CHECK GLUCOSE AS NEEDED    FreeStyle Evy reader (FreeStyle Evy 2 [place]) misc Use as instructed    furosemide (LASIX) 40 mg, oral, Daily    insulin lispro (HumaLOG) 100 unit/mL injection INJECT 8 UNITS SUBCUTANEOUSLY 3 TIMES DAILY PLUS SLIDING SCALE. 151-200, 2 UNITS. 201-250, 4 UNITS. 251-300, 6 UNITS. 301-350, 8 UNITS. 351-400, 10 UNITS. MAX MEAL DOSE 18 UNITS. MAX DAILY 54 UNITS    lisinopril 40 mg, Daily    metoprolol succinate XL (TOPROL-XL) 200 mg, oral, Daily    multivitamin tablet 1 tablet, Daily    pen needle, diabetic 31 gauge x 5/16\" needle USE 1 NEW PEN NEEDLE 4 TIMES DAILY    Semglee,insulin glarg-yfgn,Pen 100 unit/mL (3 mL) Pen INJECT 35 UNITS SUBCUTANEOUSLY AT BEDTIME    spironolactone (ALDACTONE) 25 mg, oral, Daily    tadalafil (CIALIS) 5 mg, oral, Daily, Take one tablet and if not effective, ok to increase to 10mg prn       Physical Exam:  Physical Exam  Constitutional:       General: He is not in acute distress.  HENT:      Head: Normocephalic.      Nose: Nose normal.      Mouth/Throat:      Mouth: Mucous membranes are moist.   Eyes:      Conjunctiva/sclera: Conjunctivae normal.   Neck:      Vascular: No carotid bruit.      Comments: No JVD  Cardiovascular:      Rate and Rhythm: Normal rate and regular rhythm.      Pulses: Normal pulses.      Heart sounds: Normal heart sounds. No murmur heard.  Pulmonary:      Effort: Pulmonary effort is " normal. No respiratory distress.      Breath sounds: Normal breath sounds.   Abdominal:      General: Abdomen is flat.      Palpations: Abdomen is soft.   Musculoskeletal:         General: No swelling.      Cervical back: Neck supple.      Right lower leg: No edema.      Left lower leg: No edema.   Skin:     General: Skin is warm and dry.   Neurological:      General: No focal deficit present.      Mental Status: He is alert. Mental status is at baseline.   Psychiatric:         Mood and Affect: Mood normal.         Behavior: Behavior normal.            Last Labs:  CBC -  Lab Results   Component Value Date    WBC 7.8 03/21/2024    HGB 16.5 03/21/2024    HCT 50.8 03/21/2024    MCV 90 03/21/2024     03/21/2024       CMP -  Lab Results   Component Value Date    CALCIUM 9.5 11/11/2024    PHOS 3.6 03/21/2024    PROT 7.0 12/14/2023    ALBUMIN 4.5 03/21/2024    AST 24 08/14/2023    ALT 32 08/14/2023    ALKPHOS 58 08/14/2023    BILITOT 0.4 08/14/2023       LIPID PANEL -   Lab Results   Component Value Date    CHOL 86 07/18/2023    TRIG 155 (H) 07/18/2023    HDL 36.2 (A) 07/18/2023    CHHDL 2.4 07/18/2023    LDLF 19 07/18/2023    VLDL 31 07/18/2023    NHDL 83 10/18/2022       RENAL FUNCTION PANEL -   Lab Results   Component Value Date    GLUCOSE 296 (H) 11/11/2024     11/11/2024    K 4.4 11/11/2024     11/11/2024    CO2 28 11/11/2024    ANIONGAP 11 11/11/2024    BUN 29 (H) 11/11/2024    CREATININE 1.59 (H) 11/11/2024    GFRMALE 41 (A) 08/14/2023    CALCIUM 9.5 11/11/2024    PHOS 3.6 03/21/2024    ALBUMIN 4.5 03/21/2024        Lab Results   Component Value Date     (H) 10/17/2022    HGBA1C 8.4 (A) 11/06/2024       Last Cardiology Tests:  ECG:  ECG 12 lead (Clinic Performed) 01/27/2025 (Preliminary) independently reviewed, normal sinus rhythm with occasional PVCs, left bundle branch block consistent with previous.    Echo:  Transthoracic echocardiogram (03/07/2023):  CONCLUSIONS:  1. Left ventricular  "systolic function is mildly to moderately decreased with a 40-45% estimated ejection fraction.  2. Abnormal septal motion consistent with post-operative status.  3. Spectral Doppler shows an abnormal pattern of left ventricular diastolic filling.  4. There is low normal right ventricular systolic function.  5. Mild aortic valve stenosis.  6. There is mild mitral and tricuspid regurgitation.  7. The estimated pulmonary artery pressure is mildly elevated with the RVSP at 36.4 mmHg.    Transthoracic Echocardiogram (10/18/2022):  CONCLUSIONS:   1. Left ventricular systolic function is mildly decreased with a 40-45% estimated ejection fraction.   2. Spectral Doppler shows an impaired relaxation pattern of left ventricular diastolic filling.   3. Mild aortic valve stenosis.   4. There is mild mitral, tricuspid and pulmonic regurgitation.   5. There is global hypokinesis of the left ventricle with minor regional variations.    Ejection Fractions:  No results found for: \"EF\"    Cath:  Left heart cath (10/18/2022):  CONCLUSIONS:   1. Multivessel disease as noted above.   2. Findings conveyed to Eugenio Zazueta and Dari Vizcaino.   3. GDMT for ACS and HF.     Stress Test:  Cardiac stress test (2/21/2023):  Summary:  1. No clinical evidence for ischemia at a submaximal workload.  2. The submaximal level of stress was achieved.  3. This exercise test is indeterminate because of an abnormal baseline ECG: LBBB .  4. OK for CV rehab.    Cardiac Imaging:  No results found for this or any previous visit from the past 1095 days.      Lab review: I have Chemistry BMP   Lab Results   Component Value Date    GLUCOSE 296 (H) 11/11/2024    CALCIUM 9.5 11/11/2024    CO2 28 11/11/2024    CREATININE 1.59 (H) 11/11/2024   , CBC:  Lab Results   Component Value Date    WBC 7.8 03/21/2024    RBC 5.66 03/21/2024    HGB 16.5 03/21/2024    HCT 50.8 03/21/2024    MCV 90 03/21/2024    MCH 29.2 03/21/2024    MCHC 32.5 03/21/2024    RDW 13.2 03/21/2024    " NRBC 0.0 03/21/2024   , Coags:   Lab Results   Component Value Date    APTT 28 10/18/2022    INR 1.2 (H) 10/18/2022   , and Lipids:   Lab Results   Component Value Date    CHOL 86 07/18/2023    HDL 36.2 (A) 07/18/2023    TRIG 155 (H) 07/18/2023     Diagnostic review: I have personally reviewed the result(s) of the EKG and Echocardiogram .   Imaging review: I have left heart cath.    Assessment/Plan   Problem List Items Addressed This Visit             ICD-10-CM    Uncontrolled hypertension - Primary I10    Relevant Orders    ECG 12 lead (Clinic Performed) (Completed)    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 pectoris I25.119    S/P CABG (coronary artery bypass graft) Z95.1    Other hyperlipidemia E78.49    Atrial fibrillation (Multi) I48.91    Pre-op evaluation Z01.818     Patel Goncalves is a 74-year-old male with a past medical history of hypertension, CAD status post CABG x 4 (LIMA to LAD, vein graft to OM1 and OM 2, vein graft to PLB) in November 2022, chronic ischemic heart disease, mild left ventricular systolic dysfunction who presents today for preoperative cardiovascular evaluation prior to left carpal tunnel surgery.    1.  Preoperative cardiovascular evaluation  2.  Chronic, stable ischemic heart disease: CAD status post  CABG x 4 in 2022  3.  Chronic ischemic cardiomyopathy, HFrEF (left ventricular systolic function 45%)  4.  Hypertension    Blood pressure is improved on current regimen.  Patient is able to complete greater than 2 METS of activity without any anginal symptoms.  EKG is stable.  Euvolemic on exam without recent heart failure symptoms and/or exacerbation.  Patient is intermediate risk for perioperative complications given past medical history.  Though disease and symptoms are stable at this time.  He may proceed with procedure without additional cardiac testing at this time.  Please continue guideline directed medical therapy for stable ischemic  heart disease including aspirin 81 mg daily, clopidogrel 75 mg daily, atorvastatin 80 mg nightly, metoprolol succinate 200 mg daily.  Continue guideline directed medical therapy for heart failure including Toprol 200 mg daily, lisinopril 40 mg daily, spironolactone 25 mg daily, Lasix 40 mg daily.  Return to care for routine follow-up in 1 year.       Kylee Fallon PA-C

## 2025-01-28 ENCOUNTER — APPOINTMENT (OUTPATIENT)
Dept: PRIMARY CARE | Facility: CLINIC | Age: 75
End: 2025-01-28
Payer: MEDICARE

## 2025-01-28 VITALS
HEIGHT: 69 IN | WEIGHT: 200.4 LBS | BODY MASS INDEX: 29.68 KG/M2 | RESPIRATION RATE: 18 BRPM | OXYGEN SATURATION: 94 % | HEART RATE: 63 BPM | SYSTOLIC BLOOD PRESSURE: 145 MMHG | DIASTOLIC BLOOD PRESSURE: 80 MMHG

## 2025-01-28 DIAGNOSIS — Z01.818 PREOP EXAM FOR INTERNAL MEDICINE: ICD-10-CM

## 2025-01-28 DIAGNOSIS — H66.90 EAR INFECTION: ICD-10-CM

## 2025-01-28 DIAGNOSIS — Z95.1 S/P CABG (CORONARY ARTERY BYPASS GRAFT): ICD-10-CM

## 2025-01-28 DIAGNOSIS — N52.9 ERECTILE DYSFUNCTION, UNSPECIFIED ERECTILE DYSFUNCTION TYPE: ICD-10-CM

## 2025-01-28 DIAGNOSIS — E11.69 TYPE 2 DIABETES MELLITUS WITH OTHER SPECIFIED COMPLICATION, UNSPECIFIED WHETHER LONG TERM INSULIN USE (MULTI): ICD-10-CM

## 2025-01-28 DIAGNOSIS — E11.9 TYPE 2 DIABETES MELLITUS WITHOUT COMPLICATION, WITH LONG-TERM CURRENT USE OF INSULIN (MULTI): ICD-10-CM

## 2025-01-28 DIAGNOSIS — Z79.4 TYPE 2 DIABETES MELLITUS WITHOUT COMPLICATION, WITH LONG-TERM CURRENT USE OF INSULIN (MULTI): ICD-10-CM

## 2025-01-28 PROCEDURE — 1126F AMNT PAIN NOTED NONE PRSNT: CPT | Performed by: INTERNAL MEDICINE

## 2025-01-28 PROCEDURE — 1159F MED LIST DOCD IN RCRD: CPT | Performed by: INTERNAL MEDICINE

## 2025-01-28 PROCEDURE — 1036F TOBACCO NON-USER: CPT | Performed by: INTERNAL MEDICINE

## 2025-01-28 PROCEDURE — 99214 OFFICE O/P EST MOD 30 MIN: CPT | Performed by: INTERNAL MEDICINE

## 2025-01-28 PROCEDURE — 1157F ADVNC CARE PLAN IN RCRD: CPT | Performed by: INTERNAL MEDICINE

## 2025-01-28 PROCEDURE — 4010F ACE/ARB THERAPY RXD/TAKEN: CPT | Performed by: INTERNAL MEDICINE

## 2025-01-28 PROCEDURE — 3079F DIAST BP 80-89 MM HG: CPT | Performed by: INTERNAL MEDICINE

## 2025-01-28 PROCEDURE — 3077F SYST BP >= 140 MM HG: CPT | Performed by: INTERNAL MEDICINE

## 2025-01-28 PROCEDURE — 1160F RVW MEDS BY RX/DR IN RCRD: CPT | Performed by: INTERNAL MEDICINE

## 2025-01-28 RX ORDER — TADALAFIL 5 MG/1
5 TABLET ORAL DAILY
Qty: 30 TABLET | Refills: 0 | Status: SHIPPED | OUTPATIENT
Start: 2025-01-28 | End: 2025-02-27

## 2025-01-28 RX ORDER — CIPROFLOXACIN AND DEXAMETHASONE 3; 1 MG/ML; MG/ML
4 SUSPENSION/ DROPS AURICULAR (OTIC) 2 TIMES DAILY
Qty: 7.5 ML | Refills: 0 | Status: SHIPPED | OUTPATIENT
Start: 2025-01-28 | End: 2025-02-04

## 2025-01-28 RX ORDER — FLASH GLUCOSE SENSOR
KIT MISCELLANEOUS
Qty: 6 EACH | Refills: 0 | Status: SHIPPED | OUTPATIENT
Start: 2025-01-28 | End: 2025-01-29

## 2025-01-28 ASSESSMENT — ENCOUNTER SYMPTOMS
CONSTITUTIONAL NEGATIVE: 1
GASTROINTESTINAL NEGATIVE: 1
MUSCULOSKELETAL NEGATIVE: 1
NEUROLOGICAL NEGATIVE: 1
EYES NEGATIVE: 1
ENDOCRINE NEGATIVE: 1
PSYCHIATRIC NEGATIVE: 1
CARDIOVASCULAR NEGATIVE: 1
RESPIRATORY NEGATIVE: 1

## 2025-01-28 ASSESSMENT — PAIN SCALES - GENERAL: PAINLEVEL_OUTOF10: 0-NO PAIN

## 2025-01-28 NOTE — ASSESSMENT & PLAN NOTE
Date of surgery: 2/7/2025  Type of surgery: left medial nerve decompression  Surgeon performing: Dr Palumbo  Labs/tests: HgbA1c 8.4  Previous stress test and/or Echo: ECHO 3/3/2023 showed LVSF 40-45%, septal motion consistent with postOp status, abnormal pattern of LVDF, low normal RVSF, mild AV stenosis. Mild MR, TR. PAP mildly elevated.   Smoking status: never  History of DVT/PE: no  Prior anesthesia: yes  Blood thinners: plavix

## 2025-01-28 NOTE — ASSESSMENT & PLAN NOTE
- c/w statin and plavix, baby ASA  - c/w amlodipine, lasix, metoprolol, lisinopril, aldactone  Visit Vitals  /80   Pulse 63   Resp 18    - All strategies to keep the blood pressure down is explained to the patient  - Regular exercise and blood pressure monitoring is encouraged

## 2025-01-28 NOTE — ASSESSMENT & PLAN NOTE
- c/w humalog 8u TID + SSI  - c/w lantus 35u at bedtime  - last HgbA1c was 8.4 on 11/6/2024  - Patient educated about hypoglycemia and hyperglycemia  - advised 1800 ADA diet  - Education and counseling was done  - Annual Diabetic retinopathy exam  - Annual Podiatry foot exam and self check every day.  - Regular exercise is encouraged  - Regular home monitoring of the blood sugar is advised  - Advised to take the medication as advised and compliance as needed.

## 2025-01-29 ENCOUNTER — TELEPHONE (OUTPATIENT)
Dept: CARDIOLOGY | Facility: HOSPITAL | Age: 75
End: 2025-01-29

## 2025-01-29 RX ORDER — FLASH GLUCOSE SENSOR
KIT MISCELLANEOUS
Qty: 6 EACH | Refills: 0 | Status: SHIPPED | OUTPATIENT
Start: 2025-01-29

## 2025-01-29 NOTE — PROGRESS NOTES
Mr. Patel Goncalves is able to complete greater than 2 METS of activity without any anginal symptoms.  EKG is stable.  Euvolemic on exam without recent heart failure symptoms and/or exacerbation.  Patient is intermediate risk for perioperative complications given past medical history.  Though disease and symptoms are stable at this time.  He may proceed with procedure without additional cardiac testing at this time.  - He may hold Plavix prior to procedure, and can resume when risk of bleeding is deemed acceptable by the surgical team.   - He should continue ASA 81mg daily without interruption.     Reviewed and approved by SHANKAR OBREGON on 1/29/25 at 11:39 AM.

## 2025-01-30 ENCOUNTER — PRE-ADMISSION TESTING (OUTPATIENT)
Dept: PREADMISSION TESTING | Facility: HOSPITAL | Age: 75
End: 2025-01-30
Payer: MEDICARE

## 2025-01-30 VITALS
BODY MASS INDEX: 29.8 KG/M2 | WEIGHT: 196.65 LBS | RESPIRATION RATE: 18 BRPM | OXYGEN SATURATION: 95 % | HEIGHT: 68 IN | HEART RATE: 64 BPM | DIASTOLIC BLOOD PRESSURE: 82 MMHG | TEMPERATURE: 96.4 F | SYSTOLIC BLOOD PRESSURE: 123 MMHG

## 2025-01-30 DIAGNOSIS — E11.9 TYPE 2 DIABETES MELLITUS WITHOUT COMPLICATION, WITH LONG-TERM CURRENT USE OF INSULIN (MULTI): Primary | ICD-10-CM

## 2025-01-30 DIAGNOSIS — Z79.4 TYPE 2 DIABETES MELLITUS WITHOUT COMPLICATION, WITH LONG-TERM CURRENT USE OF INSULIN (MULTI): Primary | ICD-10-CM

## 2025-01-30 DIAGNOSIS — Z01.818 PRE-OP EVALUATION: Primary | ICD-10-CM

## 2025-01-30 LAB
ANION GAP SERPL CALCULATED.3IONS-SCNC: 12 MMOL/L (ref 10–20)
BASOPHILS # BLD AUTO: 0.03 X10*3/UL (ref 0–0.1)
BASOPHILS NFR BLD AUTO: 0.4 %
BUN SERPL-MCNC: 28 MG/DL (ref 6–23)
CALCIUM SERPL-MCNC: 9.9 MG/DL (ref 8.6–10.3)
CHLORIDE SERPL-SCNC: 105 MMOL/L (ref 98–107)
CO2 SERPL-SCNC: 27 MMOL/L (ref 21–32)
CREAT SERPL-MCNC: 1.49 MG/DL (ref 0.5–1.3)
EGFRCR SERPLBLD CKD-EPI 2021: 49 ML/MIN/1.73M*2
EOSINOPHIL # BLD AUTO: 0.21 X10*3/UL (ref 0–0.4)
EOSINOPHIL NFR BLD AUTO: 2.9 %
ERYTHROCYTE [DISTWIDTH] IN BLOOD BY AUTOMATED COUNT: 13.6 % (ref 11.5–14.5)
GLUCOSE SERPL-MCNC: 229 MG/DL (ref 74–99)
HCT VFR BLD AUTO: 51.4 % (ref 41–52)
HGB BLD-MCNC: 17.2 G/DL (ref 13.5–17.5)
IMM GRANULOCYTES # BLD AUTO: 0.02 X10*3/UL (ref 0–0.5)
IMM GRANULOCYTES NFR BLD AUTO: 0.3 % (ref 0–0.9)
LYMPHOCYTES # BLD AUTO: 1.12 X10*3/UL (ref 0.8–3)
LYMPHOCYTES NFR BLD AUTO: 15.6 %
MCH RBC QN AUTO: 28.5 PG (ref 26–34)
MCHC RBC AUTO-ENTMCNC: 33.5 G/DL (ref 32–36)
MCV RBC AUTO: 85 FL (ref 80–100)
MONOCYTES # BLD AUTO: 0.98 X10*3/UL (ref 0.05–0.8)
MONOCYTES NFR BLD AUTO: 13.6 %
NEUTROPHILS # BLD AUTO: 4.82 X10*3/UL (ref 1.6–5.5)
NEUTROPHILS NFR BLD AUTO: 67.2 %
NRBC BLD-RTO: 0 /100 WBCS (ref 0–0)
PLATELET # BLD AUTO: 156 X10*3/UL (ref 150–450)
POTASSIUM SERPL-SCNC: 4.8 MMOL/L (ref 3.5–5.3)
RBC # BLD AUTO: 6.04 X10*6/UL (ref 4.5–5.9)
SODIUM SERPL-SCNC: 139 MMOL/L (ref 136–145)
WBC # BLD AUTO: 7.2 X10*3/UL (ref 4.4–11.3)

## 2025-01-30 PROCEDURE — 85025 COMPLETE CBC W/AUTO DIFF WBC: CPT

## 2025-01-30 PROCEDURE — 36415 COLL VENOUS BLD VENIPUNCTURE: CPT

## 2025-01-30 PROCEDURE — 99204 OFFICE O/P NEW MOD 45 MIN: CPT | Performed by: NURSE PRACTITIONER

## 2025-01-30 PROCEDURE — 80048 BASIC METABOLIC PNL TOTAL CA: CPT

## 2025-01-30 RX ORDER — INSULIN GLARGINE 100 [IU]/ML
INJECTION, SOLUTION SUBCUTANEOUS
Qty: 30 ML | Refills: 0 | Status: SHIPPED | OUTPATIENT
Start: 2025-01-30

## 2025-01-30 ASSESSMENT — DUKE ACTIVITY SCORE INDEX (DASI)
CAN YOU PARTICIPATE IN STRENOUS SPORTS LIKE SWIMMING, SINGLES TENNIS, FOOTBALL, BASKETBALL, OR SKIING: NO
TOTAL_SCORE: 36.7
CAN YOU HAVE SEXUAL RELATIONS: YES
CAN YOU WALK A BLOCK OR TWO ON LEVEL GROUND: YES
CAN YOU DO YARD WORK LIKE RAKING LEAVES, WEEDING OR PUSHING A MOWER: YES
CAN YOU CLIMB A FLIGHT OF STAIRS OR WALK UP A HILL: YES
CAN YOU DO LIGHT WORK AROUND THE HOUSE LIKE DUSTING OR WASHING DISHES: YES
CAN YOU DO HEAVY WORK AROUND THE HOUSE LIKE SCRUBBING FLOORS OR LIFTING AND MOVING HEAVY FURNITURE: YES
CAN YOU WALK INDOORS, SUCH AS AROUND YOUR HOUSE: YES
CAN YOU DO MODERATE WORK AROUND THE HOUSE LIKE VACUUMING, SWEEPING FLOORS OR CARRYING GROCERIES: YES
CAN YOU RUN A SHORT DISTANCE: NO
CAN YOU PARTICIPATE IN MODERATE RECREATIONAL ACTIVITIES LIKE GOLF, BOWLING, DANCING, DOUBLES TENNIS OR THROWING A BASEBALL OR FOOTBALL: NO
DASI METS SCORE: 7.3
CAN YOU TAKE CARE OF YOURSELF (EAT, DRESS, BATHE, OR USE TOILET): YES

## 2025-01-30 ASSESSMENT — ENCOUNTER SYMPTOMS
ENDOCRINE NEGATIVE: 1
ARTHRALGIAS: 1
CARDIOVASCULAR NEGATIVE: 1
NECK NEGATIVE: 1
GASTROINTESTINAL NEGATIVE: 1
EYES NEGATIVE: 1
RESPIRATORY NEGATIVE: 1
CONSTITUTIONAL NEGATIVE: 1
NEUROLOGICAL NEGATIVE: 1

## 2025-01-30 ASSESSMENT — LIFESTYLE VARIABLES: SMOKING_STATUS: NONSMOKER

## 2025-01-30 NOTE — PREPROCEDURE INSTRUCTIONS
Why must I stop eating and drinking near surgery time?  With sedation, food or liquid in your stomach can enter your lungs causing serious complications  Increases nausea and vomiting    When do I need to stop eating and drinking before my surgery?   Do not eat or drink after midnight the night before your surgery/procedure.  You may have small sips of water to take your medication.    PAT DISCHARGE INSTRUCTIONS    Please call the Same Day Surgery (SDS) Department of the hospital where your procedure will be performed after 2:00 PM the day before your surgery. If you are scheduled on a Monday, or a Tuesday following a Monday holiday, you will need to call on the last business day prior to your surgery.      [facility]  [address]  [place] [id number]  [phone]    Please let your surgeon know if:      You develop any open sores, shingles, burning or painful urination as these may increase your risk of an infection.   You no longer wish to have the surgery.   Any other personal circumstances change that may lead to the need to cancel or defer this surgery-such as being sick or getting admitted to any hospital within one week of your planned procedure.    Your contact details change, such as a change of address or phone number.    Starting now:     Please DO NOT drink alcohol or smoke for 24 hours before surgery. It is well known that quitting smoking can make a huge difference to your health and recovery from surgery. The longer you abstain from smoking, the better your chances of a healthy recovery. If you need help with quitting, call 3-111-QUIT-NOW to be connected to a trained counselor who will discuss the best methods to help you quit.     Before your surgery:    Please stop all supplements 7 days prior to surgery. Or as directed by your surgeon.   Please stop taking NSAID pain medicine such as Advil and Motrin 7 days before surgery.    If you develop any  fever, cough, cold, rashes, cuts, scratches, scrapes, urinary symptoms or infection anywhere on your body (including teeth and gums) prior to surgery, please call your surgeon’s office as soon as possible. This may require treatment to reduce the chance of cancellation on the day of surgery.    The day before your surgery:   DIET- Please follow the diet instructions at the top of your packet.   Get a good night’s rest.  Use the special soap for bathing if you have been instructed to use one.    Scheduled surgery times may change and you will be notified if this occurs - please check your personal voicemail for any updates.     On the morning of surgery:   Wear comfortable, loose fitting clothes which open in the front. Please do not wear moisturizers, creams, lotions, makeup or perfume.    Please bring with you to surgery:   Photo ID and insurance card   Current list of medicines and allergies   Pacemaker/ Defibrillator/Heart stent cards   CPAP machine and mask    Slings/ splints/ crutches   A copy of your complete advanced directive/DHPOA.    Please do NOT bring with you to surgery:   All jewelry and valuables should be left at home.   Prosthetic devices such as contact lenses, hearing aids, dentures, eyelash extensions, hairpins and body piercings must be removed prior to going in to the surgical suite.    After outpatient surgery:   A responsible adult MUST accompany you at the time of discharge and stay with you for 24 hours after your surgery. You may NOT drive yourself home after surgery.    Do not drive, operate machinery, make critical decisions or do activities that require co-ordination or balance until after a night’s sleep.   Do not drink alcoholic beverages for 24 hours.   Instructions for resuming your medications will be provided by your surgeon.    CALL YOUR DOCTOR AFTER SURGERY IF YOU HAVE:     Chills and/or a fever of 101° F or higher.    Redness, swelling, pus or drainage from your surgical wound  or a bad smell from the wound.    Lightheadedness, fainting or confusion.    Persistent vomiting (throwing up) and are not able to eat or drink for 12 hours.    Three or more loose, watery bowel movements in 24 hours (diarrhea).   Difficulty or pain while urinating( after non-urological surgery)    Pain and swelling in your legs, especially if it is only on one side.    Difficulty breathing or are breathing faster than normal.    Any new concerning symptoms.         Medication List            Accurate as of January 30, 2025 10:49 AM. Always use your most recent med list.                amLODIPine 10 mg tablet  Commonly known as: Norvasc  Take 1 tablet (10 mg) by mouth once daily.  Medication Adjustments for Surgery: Take on the morning of surgery     aspirin 81 mg chewable tablet  Medication Adjustments for Surgery: Take on the morning of surgery     atorvastatin 80 mg tablet  Commonly known as: Lipitor  TAKE 1 TABLET BY MOUTH ONCE DAILY AT BEDTIME  Notes to patient: Take evening dose before surgery.     ciprofloxacin-dexamethasone otic suspension  Commonly known as: CiproDEX  Administer 4 drops into the left ear 2 times a day for 7 days.  Medication Adjustments for Surgery: Take/Use as prescribed     clopidogrel 75 mg tablet  Commonly known as: Plavix  Take 1 tablet by mouth once daily  Additional Medication Adjustments for Surgery: Take last dose 7 days before surgery  Notes to patient: LAST DOSE TODAY THEN HOLD FOR SURGERY     dapagliflozin propanediol 10 mg  Commonly known as: Farxiga  Take 1 tablet (10 mg) by mouth once daily.  Medication Adjustments for Surgery: Take last dose 3 days before surgery  Notes to patient: 2/3/25 LAST DOSE THEN HOLD     FreeStyle Evy 2 Sensor kit  Generic drug: flash glucose sensor kit  USE AS DIRECTED -CHANGE  SENSOR  EVERY  14  DAYS     furosemide 40 mg tablet  Commonly known as: Lasix  Take 1 tablet (40 mg) by mouth once daily.  Medication Adjustments for Surgery: Do Not take  "on the morning of surgery     insulin lispro 100 unit/mL injection  Commonly known as: HumaLOG  INJECT 8 UNITS SUBCUTANEOUSLY 3 TIMES DAILY PLUS SLIDING SCALE. 151-200, 2 UNITS. 201-250, 4 UNITS. 251-300, 6 UNITS. 301-350, 8 UNITS. 351-400, 10 UNITS. MAX MEAL DOSE 18 UNITS. MAX DAILY 54 UNITS  Medication Adjustments for Surgery: Do Not take on the morning of surgery     Lantus Solostar U-100 Insulin 100 unit/mL (3 mL) pen  Generic drug: insulin glargine  INJECT 35 UNITS SUBCUTANEOUSLY AT BEDTIME  Notes to patient: Take evening dose before surgery.     lisinopril 40 mg tablet  Medication Adjustments for Surgery: Take last dose 1 day (24 hours) before surgery     metoprolol succinate  mg 24 hr tablet  Commonly known as: Toprol-XL  Take 1 tablet by mouth once daily  Notes to patient: Take evening dose before surgery.     multivitamin tablet  Additional Medication Adjustments for Surgery: Take last dose 7 days before surgery     pen needle, diabetic 31 gauge x 5/16\" needle  USE 1 NEW PEN NEEDLE 4 TIMES DAILY     Semglee(insulin glarg-yfgn)Pen 100 unit/mL (3 mL) Pen  Generic drug: insulin glargine-yfgn  INJECT 35 UNITS SUBCUTANEOUSLY AT BEDTIME  Notes to patient: NOT TAKING     spironolactone 25 mg tablet  Commonly known as: Aldactone  Take 1 tablet (25 mg) by mouth once daily.  Medication Adjustments for Surgery: Take on the morning of surgery     tadalafil 5 mg tablet  Commonly known as: Cialis  Take 1 tablet (5 mg) by mouth once daily. Take one tablet and if not effective, ok to increase to 10mg prn  Medication Adjustments for Surgery: Take last dose 3 days before surgery                                                                  "

## 2025-01-30 NOTE — H&P (VIEW-ONLY)
"CPM/PAT Evaluation       Name: Patel Goncalves (Patel Goncalves \"Tatyana\")  /Age: 1950/74 y.o.     Visit Type:   In-Person       Chief Complaint: Carpal tunnel syndrome Left    HPI 75 yo male who is referred to PAT by Dr Palumbo for evaluation in advance of his L median nerve decompression surgery 25 at [facility], OR anticipated < 1/2 hr under MAC.    See PMH below    Past Medical History:   Diagnosis Date    Arthritis     Bitten or stung by nonvenomous insect and other nonvenomous arthropods, initial encounter 10/21/2019    Tick bite    CHF (congestive heart failure)     CKD (chronic kidney disease)     Coronary artery disease     Cutaneous abscess of left upper limb 2015    Abscess of arm, left    Disorder of the skin and subcutaneous tissue, unspecified 2020    Changing skin lesion    Essential hypertension 2010    Fatigue 2024    Heart disease     Hyperlipidemia     Joint pain     Myocardial infarction (Multi)     Personal history of other diseases of urinary system 2019    History of hematuria    Personal history of other specified conditions 2019    History of chronic fatigue    Prepatellar bursitis, unspecified knee 10/30/2018    Prepatellar bursitis    Renal glomerular disease 2023    Type 2 diabetes mellitus     Unspecified renal colic 2019    Kidney pain    Urethral stricture 2024       Past Surgical History:   Procedure Laterality Date    APPENDECTOMY      CARDIAC CATHETERIZATION      CARDIAC SURGERY      bypass    COLONOSCOPY      CORONARY ARTERY BYPASS GRAFT      TRIGGER FINGER RELEASE Left        Patient  reports that he is not currently sexually active.    Family History   Problem Relation Name Age of Onset    No Known Problems Mother      No Known Problems Father      Aortic aneurysm Brother      Diabetes Maternal Grandmother         Allergies   Allergen Reactions    Bee Venom Protein (Honey Bee) Hives and Itching    "  Hives from Hornets    Naproxen Sodium Itching       Medication Documentation Review Audit       Reviewed by Tatyana Gallegos RN (Registered Nurse) on 01/30/25 at 1037      Medication Order Taking? Sig Documenting Provider Last Dose Status   amLODIPine (Norvasc) 10 mg tablet [id number] Yes Take 1 tablet (10 mg) by mouth once daily. Jose Antonio Maynard MD 1/30/2025 Active   aspirin 81 mg chewable tablet [id number] Yes Chew once daily. Kizzy Cronin MD 1/30/2025 Active   atorvastatin (Lipitor) 80 mg tablet [id number] Yes TAKE 1 TABLET BY MOUTH ONCE DAILY AT BEDTIME Mike Moran MD 1/29/2025 Active   ciprofloxacin-dexamethasone (CiproDEX) otic suspension [id number] Yes Administer 4 drops into the left ear 2 times a day for 7 days. Mike Moran MD 1/30/2025 Active   clopidogrel (Plavix) 75 mg tablet [id number] Yes Take 1 tablet by mouth once daily Mike Moran MD 1/30/2025 Active   dapagliflozin propanediol (Farxiga) 10 mg [id number] Yes Take 1 tablet (10 mg) by mouth once daily. Mike Moran MD 1/30/2025 Active     Discontinued 01/28/25 1619     Discontinued 01/28/25 0904     Discontinued 01/29/25 0911   flash glucose sensor kit (FreeStyle Evy 2 Sensor) kit [id number]  USE AS DIRECTED -CHANGE  SENSOR  EVERY  14  DAYS Mike Moran MD  Active     Discontinued 01/28/25 0904     Discontinued 01/28/25 0904     Discontinued 01/28/25 0904     Discontinued 01/28/25 0904   furosemide (Lasix) 40 mg tablet [id number] Yes Take 1 tablet (40 mg) by mouth once daily. Jose Antonio Maynard MD 1/30/2025 Active   insulin lispro (HumaLOG) 100 unit/mL injection [id number] Yes INJECT 8 UNITS SUBCUTANEOUSLY 3 TIMES DAILY PLUS SLIDING SCALE. 151-200, 2 UNITS. 201-250, 4 UNITS. 251-300, 6 UNITS. 301-350, 8 UNITS. 351-400, 10 UNITS. MAX MEAL DOSE 18 UNITS. MAX DAILY 54 UNITS Mike Moran MD 1/30/2025 Active   Lantus Solostar U-100 Insulin 100 unit/mL (3 mL) pen [id number] Yes INJECT 35 UNITS SUBCUTANEOUSLY AT BEDTIME Mike Moran,  "MD 1/29/2025 Active   lisinopril 40 mg tablet [id number] Yes Take 1 tablet (40 mg) by mouth once daily. for blood pressure Historical Provider, MD 1/30/2025 Active   metoprolol succinate XL (Toprol-XL) 200 mg 24 hr tablet [id number] Yes Take 1 tablet by mouth once daily Mike Moran MD 1/30/2025 Active   multivitamin tablet [id number] Yes Take 1 tablet by mouth once daily. Historical Provider, MD 1/30/2025 Active   pen needle, diabetic 31 gauge x 5/16\" needle [id number]  USE 1 NEW PEN NEEDLE 4 TIMES DAILY Mike Moran MD  Active   Semglee,insulin glarg-yfgn,Pen 100 unit/mL (3 mL) Pen [id number] No INJECT 35 UNITS SUBCUTANEOUSLY AT BEDTIME   Patient not taking: Reported on 1/30/2025    Mike Moran MD Not Taking Active   spironolactone (Aldactone) 25 mg tablet [id number] Yes Take 1 tablet (25 mg) by mouth once daily. Jose Antonio Maynard MD 1/30/2025 Active     Discontinued 01/28/25 0914   tadalafil (Cialis) 5 mg tablet [id number] Yes Take 1 tablet (5 mg) by mouth once daily. Take one tablet and if not effective, ok to increase to 10mg prn Mike Moran MD 1/30/2025 Active                         PAT ROS:   Constitutional:   neg    Neuro/Psych:   neg    Eyes:   neg    Ears:    Recent L ear infection/scratch  neg    Nose:   neg    Mouth:   neg    Throat:   neg    Neck:   neg    Cardio:   neg    Respiratory:   neg    Endocrine:   neg    GI:   neg    :   neg    Musculoskeletal:    See HPI   arthralgias  Hematologic:   neg    Skin:  neg        Physical Exam  Constitutional:       Appearance: Normal appearance.   HENT:      Head: Normocephalic.      Comments: L ear has cotton     Nose: Nose normal.      Mouth/Throat:      Mouth: Mucous membranes are moist.      Pharynx: Oropharynx is clear.   Eyes:      Extraocular Movements: Extraocular movements intact.      Conjunctiva/sclera: Conjunctivae normal.      Pupils: Pupils are equal, round, and reactive to light.   Cardiovascular:      Rate and Rhythm: Normal rate and " "regular rhythm.      Pulses: Normal pulses.      Heart sounds: Normal heart sounds.   Pulmonary:      Effort: Pulmonary effort is normal.      Breath sounds: Normal breath sounds.   Abdominal:      General: Bowel sounds are normal.      Palpations: Abdomen is soft.   Musculoskeletal:         General: Normal range of motion.      Cervical back: Normal range of motion and neck supple.   Skin:     General: Skin is warm and dry.      Capillary Refill: Capillary refill takes 2 to 3 seconds.   Neurological:      General: No focal deficit present.      Mental Status: He is alert and oriented to person, place, and time.   Psychiatric:         Mood and Affect: Mood normal.         Behavior: Behavior normal.          PAT AIRWAY:   Airway:     Mallampati::  II    TM distance::  >3 FB    Neck ROM::  Full    Visit Vitals  /82   Pulse 64   Temp 35.8 °C (96.4 °F) (Temporal)   Resp 18   Ht 1.727 m (5' 8\")   Wt 89.2 kg (196 lb 10.4 oz)   SpO2 95%   BMI 29.90 kg/m²   Smoking Status Never   BSA 2.07 m²           DASI Risk Score      Flowsheet Row Pre-Admission Testing from 1/30/2025 in [facility]   Can you take care of yourself (eat, dress, bathe, or use toilet)?  2.75 filed at 01/30/2025 1038   Can you walk indoors, such as around your house? 1.75 filed at 01/30/2025 1038   Can you walk a block or two on level ground?  2.75 filed at 01/30/2025 1038   Can you climb a flight of stairs or walk up a hill? 5.5 filed at 01/30/2025 1038   Can you run a short distance? 0 filed at 01/30/2025 1038   Can you do light work around the house like dusting or washing dishes? 2.7 filed at 01/30/2025 1038   Can you do moderate work around the house like vacuuming, sweeping floors or carrying groceries? 3.5 filed at 01/30/2025 1038   Can you do heavy work around the house like scrubbing floors or lifting and moving heavy furniture?  8 filed at 01/30/2025 1038   Can you do yard work like raking leaves, weeding or pushing a mower? " 4.5 filed at 01/30/2025 1038   Can you have sexual relations? 5.25 filed at 01/30/2025 1038   Can you participate in moderate recreational activities like golf, bowling, dancing, doubles tennis or throwing a baseball or football? 0 filed at 01/30/2025 1038   Can you participate in strenous sports like swimming, singles tennis, football, basketball, or skiing? 0 filed at 01/30/2025 1038   DASI SCORE 36.7 filed at 01/30/2025 1038   METS Score (Will be calculated only when all the questions are answered) 7.3 filed at 01/30/2025 1038          Caprini DVT Assessment      Flowsheet Row Pre-Admission Testing from 1/30/2025 in [facility]   DVT Score (IF A SCORE IS NOT CALCULATING, MUST SELECT A BMI TO COMPLETE) 4 filed at 01/30/2025 1050   Surgical Factors Minor surgery planned filed at 01/30/2025 1050   BMI (BMI MUST BE CHOSEN) 30 or less filed at 01/30/2025 1050          Modified Frailty Index    No data to display       CHADS2 Stroke Risk  Current as of about an hour ago        8.5% 3 to 100%: High Risk   2 to < 3%: Medium Risk   0 to < 2%: Low Risk     No Change          This score determines the patient's risk of having a stroke if the patient has atrial fibrillation.          Points Metrics   1 Has Congestive Heart Failure:  Yes     Patients with congestive heart failure get 1 point.    Current as of about an hour ago   1 Has Hypertension:  Yes     Patients with hypertension get 1 point.    Current as of about an hour ago   1 Age:  74     Patients who are 75 years of age or older get 1 point.    Current as of about an hour ago   1 Has Diabetes Excluding Gestational Diabetes:  Yes     Patients with diabetes get 1 point.    Current as of about an hour ago   0 Had Stroke:  No  Had TIA:  No  Had Thromboembolism:  No     Patients who have had a stroke, TIA, or thromboembolism get 2 points.    Current as of about an hour ago             Revised Cardiac Risk Index      Flowsheet Row Pre-Admission Testing from  1/30/2025 in [facility]   High-Risk Surgery (Intraperitoneal, Intrathoracic,Suprainguinal vascular) 0 filed at 01/30/2025 1051   History of ischemic heart disease (History of MI, History of positive exercuse test, Current chest paint considered due to myocardial ischemia, Use of nitrate therapy, ECG with pathological Q Waves) 0 filed at 01/30/2025 1051   History of congestive heart failure (pulmonary edemia, bilateral rales or S3 gallop, Paroxysmal nocturnal dyspnea, CXR showing pulmonary vascular redistribution) 0 filed at 01/30/2025 1051   History of cerebrovascular disease (Prior TIA or stroke) 0 filed at 01/30/2025 1051   Pre-operative insulin treatment 0 filed at 01/30/2025 1051   Pre-operative creatinine>2 mg/dl 0 filed at 01/30/2025 1051   Revised Cardiac Risk Calculator 0 filed at 01/30/2025 1051          Apfel Simplified Score      Flowsheet Row Pre-Admission Testing from 1/30/2025 in [facility]   Smoking status 1 filed at 01/30/2025 1051   History of motion sickness or PONV  0 filed at 01/30/2025 1051   Use of postoperative opioids 0 filed at 01/30/2025 1051   Gender - Female 0=No filed at 01/30/2025 1051   Apfel Simplified Score Calculator 1 filed at 01/30/2025 1051          Risk Analysis Index Results This Encounter    No data found in the last 10 encounters.       Stop Bang Score      Flowsheet Row Pre-Admission Testing from 1/30/2025 in [facility]   Do you snore loudly? 1 filed at 01/30/2025 1037   Do you often feel tired or fatigued after your sleep? 0 filed at 01/30/2025 1037   Has anyone ever observed you stop breathing in your sleep? 0 filed at 01/30/2025 1037   Do you have or are you being treated for high blood pressure? 1 filed at 01/30/2025 1037   Recent BMI (Calculated) 29.9 filed at 01/30/2025 1037   Is BMI greater than 35 kg/m2? 0=No filed at 01/30/2025 1037   Age older than 50 years old? 1=Yes filed at 01/30/2025 1037   Is your neck  circumference greater than 17 inches (Male) or 16 inches (Female)? 0 filed at 01/30/2025 1037   Gender - Male 1=Yes filed at 01/30/2025 1037   STOP-BANG Total Score 4 filed at 01/30/2025 1037          Prodigy: High Risk  Total Score: 27              Prodigy Age Score      Prodigy Gender Score     Prodigy CHF score          ARISCAT Score for Postoperative Pulmonary Complications      Flowsheet Row Pre-Admission Testing from 1/30/2025 in [facility]   Age Calculated Score 3 filed at 01/30/2025 1051   Preoperative SpO2 8 filed at 01/30/2025 1051   Respiratory infection in the last month Either upper or lower (i.e., URI, bronchitis, pneumonia), with fever and antibiotic treatment 0 filed at 01/30/2025 1051   Preoperative anemia (Hgb less than 10 g/dl) 0 filed at 01/30/2025 1051   Surgical incision  0 filed at 01/30/2025 1051   Duration of surgery  0 filed at 01/30/2025 1051   Emergency Procedure  0 filed at 01/30/2025 1051   ARISCAT Total Score  11 filed at 01/30/2025 1051          Jose Carlos Perioperative Risk for Myocardial Infarction or Cardiac Arrest (DALY)      Flowsheet Row Pre-Admission Testing from 1/30/2025 in [facility]   Calculated Age Score 1.5 filed at 01/30/2025 1052   Functional Status  0 filed at 01/30/2025 1052   ASA Class  -1.92 filed at 01/30/2025 1052   Creatinine 0.61 filed at 01/30/2025 1052   Type of Procedure  0.80 filed at 01/30/2025 1052   DALY Total Score  -4.26 filed at 01/30/2025 1052   DALY % 1.39 filed at 01/30/2025 1052          Assessment and Plan   75 yo male presents to [facility] for risk assessment and preoperative optimization in advance of his carpal tunnel surgery    Today his VS are stable, he is afebrile and pulse ox is 95% on room air at rest    Neuro:  No diagnosis or significant findings on chart review or clinical presentation and evaluation.   The patient is at an increased risk for post operative delirium secondary to age >/= 65.  Preoperative brain  "exercise  discussed with patient. The patient is at an increased risk for perioperative stroke secondary age and co morbidities    HEENT/Airway:  He is on ear drops, saw his PCP recently for L ear pain    Cardiovascular:  Denies chest pain, shortness of breathe, dizziness, palpations, or lightheadedness    HTN- BP is stable  Cont. Amlodipine as prescribed  Cont. Lasix as prescribed but hold day of surgery   Cont Lisinopril as prescribed but last dose 24 hr before surgery  Cont spironolactone as prescribed    CAD s/p CABG  Cont. Plavix per Cards ok to hold preop. Last dose today 1/30/25  Cont ASA w/o interruption  Cont Metoprolol as prescribed    HPL- has not had a recent lipid panel  Cont Atorvastatin as prescribed  Lab Results   Component Value Date    CHOL 86 07/18/2023    CHOL 117 10/18/2022    CHOL 129 10/04/2022     Lab Results   Component Value Date    HDL 36.2 (A) 07/18/2023    HDL 33.7 (A) 10/18/2022    HDL 43.1 10/04/2022     No results found for: \"LDLCALC\"  Lab Results   Component Value Date    TRIG 155 (H) 07/18/2023    TRIG 215 (H) 10/18/2022    TRIG 139 10/04/2022     No components found for: \"CHOLHDL\"      Seen by Cardiology PA Kylee Fallon on 1/27/2025 for preoperative CV evaluation. Per her note:  Patel Goncalves is a 74-year-old male with a past medical history of hypertension, CAD status post CABG x 4 (LIMA to LAD, vein graft to OM1 and OM 2, vein graft to PLB) in November 2022, chronic ischemic heart disease, mild left ventricular systolic dysfunction who presents today for preoperative cardiovascular evaluation prior to left carpal tunnel surgery.     1.  Preoperative cardiovascular evaluation  2.  Chronic, stable ischemic heart disease: CAD status post  CABG x 4 in 2022  3.  Chronic ischemic cardiomyopathy, HFrEF (left ventricular systolic function 45%)  4.  Hypertension     Blood pressure is improved on current regimen.  Patient is able to complete greater than 2 METS of activity without " any anginal symptoms.  EKG is stable.  Euvolemic on exam without recent heart failure symptoms and/or exacerbation.  Patient is intermediate risk for perioperative complications given past medical history.  Though disease and symptoms are stable at this time.  He may proceed with procedure without additional cardiac testing at this time.  Please continue guideline directed medical therapy for stable ischemic heart disease including aspirin 81 mg daily, clopidogrel 75 mg daily, atorvastatin 80 mg nightly, metoprolol succinate 200 mg daily.  Continue guideline directed medical therapy for heart failure including Toprol 200 mg daily, lisinopril 40 mg daily, spironolactone 25 mg daily, Lasix 40 mg daily.  Return to care for routine follow-up in 1 year.        Kylee Fallon PA-C    METS are  7.3, RCRI is 0 (0.4% risk for 30 day postoperative MACE)  DALY indicates a 1.39% risk of intraoperative or 30 day postoperative MACE  No additional preoperative testing is currently indicated.    Last Cardiology Tests:  ECG:  ECG 12 lead (Clinic Performed) 01/27/2025 (Preliminary) independently reviewed, normal sinus rhythm with occasional PVCs, left bundle branch block consistent with previous.     Echo:  Transthoracic echocardiogram (03/07/2023):  CONCLUSIONS:  1. Left ventricular systolic function is mildly to moderately decreased with a 40-45% estimated ejection fraction.  2. Abnormal septal motion consistent with post-operative status.  3. Spectral Doppler shows an abnormal pattern of left ventricular diastolic filling.  4. There is low normal right ventricular systolic function.  5. Mild aortic valve stenosis.  6. There is mild mitral and tricuspid regurgitation.  7. The estimated pulmonary artery pressure is mildly elevated with the RVSP at 36.4 mmHg.     Transthoracic Echocardiogram (10/18/2022):  CONCLUSIONS:   1. Left ventricular systolic function is mildly decreased with a 40-45% estimated ejection fraction.   2.  "Spectral Doppler shows an impaired relaxation pattern of left ventricular diastolic filling.   3. Mild aortic valve stenosis.   4. There is mild mitral, tricuspid and pulmonic regurgitation.   5. There is global hypokinesis of the left ventricle with minor regional variations.     Ejection Fractions:  No results found for: \"EF\"     Cath:  Left heart cath (10/18/2022):  CONCLUSIONS:   1. Multivessel disease as noted above.   2. Findings conveyed to Eugenio Zazueta and Dari Vizcaino.   3. GDMT for ACS and HF.     Stress Test:  Cardiac stress test (2/21/2023):  Summary:  1. No clinical evidence for ischemia at a submaximal workload.  2. The submaximal level of stress was achieved.  3. This exercise test is indeterminate because of an abnormal baseline ECG: LBBB .  4. OK for CV rehab.    Pulmonary:  No diagnosis or significant findings on chart review or clinical presentation and evaluation.     PRODIGY: High risk for opioid induced respiratory depression  Discussed smoking cessation and deep breathing handout given    Renal:   Stage 3 a CKD  3/26/24 saw Nephrology Dr Dr Johnson  Stable  Pt at High risk for perioperative SULMA based on Dynamic Predictive Scoring Tool for Perioperative SULMA  Lab Results   Component Value Date    GLUCOSE 296 (H) 11/11/2024    CALCIUM 9.5 11/11/2024     11/11/2024    K 4.4 11/11/2024    CO2 28 11/11/2024     11/11/2024    BUN 29 (H) 11/11/2024    CREATININE 1.59 (H) 11/11/2024       Endocrine:  Diabetes  See A1c below  Cont Farixag as prescribed last dose 3 days before surgery   Lab Results   Component Value Date    HGBA1C 8.4 (A) 11/06/2024       Hematologic:  H/O anemia  Lab Results   Component Value Date    WBC 7.8 03/21/2024    HGB 16.5 03/21/2024    HCT 50.8 03/21/2024    MCV 90 03/21/2024     03/21/2024       CAPRINI SCORE 4    Pt supplied education/VTE handout    Gastrointestinal:   No diagnosis or significant findings on chart review or clinical presentation and " evaluation.   EAT-10 score of 0 - self-perceived oropharyngeal dysphagia scale (0-40)      :  No diagnosis or significant findings on chart review or clinical presentation and evaluation.     Infectious disease:   No diagnosis or significant findings on chart review or clinical presentation and evaluation.       Musculoskeletal:   See HPI  Last dose of NSAIDS 11/30/25  Instructed if  no issues with your liver you may take Tylenol 2-500 mg tablets every 8 hrs around the clock for pain including morning of surgery with a sip of water    Preoperative risk assessment  ASA III  NSQIP - Predicted length of stay days0  PAT Testing -  bmp, cbc    Anesthesia:  No anesthesia complications    manuelito dental issues  Smoker-denies  Drugs-denies  ETOH-denies  denies personal/family issues with Anesthesia  No nickel, shell fish, or iodine allergies    Face to Face patient contact time 45    CEZAR Billy 1/30/2025 11:18 AM

## 2025-01-30 NOTE — CPM/PAT H&P
"CPM/PAT Evaluation       Name: Patel Goncalves (Patel Goncalves \"Tatyana\")  /Age: 1950/74 y.o.     Visit Type:   In-Person       Chief Complaint: Carpal tunnel syndrome Left    HPI 75 yo male who is referred to PAT by Dr Palumbo for evaluation in advance of his L median nerve decompression surgery 25 at [facility], OR anticipated < 1/2 hr under [facility].    See PMH below    Past Medical History:   Diagnosis Date    Arthritis     Bitten or stung by nonvenomous insect and other nonvenomous arthropods, initial encounter 10/21/2019    Tick bite    CHF (congestive heart failure)     CKD (chronic kidney disease)     Coronary artery disease     Cutaneous abscess of left upper limb 2015    Abscess of arm, left    Disorder of the skin and subcutaneous tissue, unspecified 2020    Changing skin lesion    Essential hypertension 2010    Fatigue 2024    Heart disease     Hyperlipidemia     Joint pain     Myocardial infarction (Multi)     Personal history of other diseases of urinary system 2019    History of hematuria    Personal history of other specified conditions 2019    History of chronic fatigue    Prepatellar bursitis, unspecified knee 10/30/2018    Prepatellar bursitis    Renal glomerular disease 2023    Type 2 diabetes mellitus     Unspecified renal colic 2019    Kidney pain    Urethral stricture 2024       Past Surgical History:   Procedure Laterality Date    APPENDECTOMY      CARDIAC CATHETERIZATION      CARDIAC SURGERY      bypass    COLONOSCOPY      CORONARY ARTERY BYPASS GRAFT      TRIGGER FINGER RELEASE Left        Patient  reports that he is not currently sexually active.    Family History   Problem Relation Name Age of Onset    No Known Problems Mother      No Known Problems Father      Aortic aneurysm Brother      Diabetes Maternal Grandmother         Allergies   Allergen Reactions    Bee Venom Protein (Honey Bee) Hives and Itching    "  Hives from Hornets    Naproxen Sodium Itching       Medication Documentation Review Audit       Reviewed by Tatyana Gallegos RN (Registered Nurse) on 01/30/25 at 1037      Medication Order Taking? Sig Documenting Provider Last Dose Status   amLODIPine (Norvasc) 10 mg tablet [id number] Yes Take 1 tablet (10 mg) by mouth once daily. Jose Antonio Maynard MD 1/30/2025 Active   aspirin 81 mg chewable tablet [id number] Yes Chew once daily. Kizzy Cronin MD 1/30/2025 Active   atorvastatin (Lipitor) 80 mg tablet [id number] Yes TAKE 1 TABLET BY MOUTH ONCE DAILY AT BEDTIME Mike Moran MD 1/29/2025 Active   ciprofloxacin-dexamethasone (CiproDEX) otic suspension [id number] Yes Administer 4 drops into the left ear 2 times a day for 7 days. Mike Moran MD 1/30/2025 Active   clopidogrel (Plavix) 75 mg tablet [id number] Yes Take 1 tablet by mouth once daily Mike Moran MD 1/30/2025 Active   dapagliflozin propanediol (Farxiga) 10 mg [id number] Yes Take 1 tablet (10 mg) by mouth once daily. Mike Moran MD 1/30/2025 Active     Discontinued 01/28/25 1619     Discontinued 01/28/25 0904     Discontinued 01/29/25 0911   flash glucose sensor kit (FreeStyle Evy 2 Sensor) kit [id number]  USE AS DIRECTED -CHANGE  SENSOR  EVERY  14  DAYS Mike Moran MD  Active     Discontinued 01/28/25 0904     Discontinued 01/28/25 0904     Discontinued 01/28/25 0904     Discontinued 01/28/25 0904   furosemide (Lasix) 40 mg tablet [id number] Yes Take 1 tablet (40 mg) by mouth once daily. Jose Antonio Maynard MD 1/30/2025 Active   insulin lispro (HumaLOG) 100 unit/mL injection [id number] Yes INJECT 8 UNITS SUBCUTANEOUSLY 3 TIMES DAILY PLUS SLIDING SCALE. 151-200, 2 UNITS. 201-250, 4 UNITS. 251-300, 6 UNITS. 301-350, 8 UNITS. 351-400, 10 UNITS. MAX MEAL DOSE 18 UNITS. MAX DAILY 54 UNITS Mike Moran MD 1/30/2025 Active   Lantus Solostar U-100 Insulin 100 unit/mL (3 mL) pen [id number] Yes INJECT 35 UNITS SUBCUTANEOUSLY AT BEDTIME Mike Moran,  "MD 1/29/2025 Active   lisinopril 40 mg tablet [id number] Yes Take 1 tablet (40 mg) by mouth once daily. for blood pressure Historical Provider, MD 1/30/2025 Active   metoprolol succinate XL (Toprol-XL) 200 mg 24 hr tablet [id number] Yes Take 1 tablet by mouth once daily Mike Moran MD 1/30/2025 Active   multivitamin tablet [id number] Yes Take 1 tablet by mouth once daily. Historical Provider, MD 1/30/2025 Active   pen needle, diabetic 31 gauge x 5/16\" needle [id number]  USE 1 NEW PEN NEEDLE 4 TIMES DAILY Mike Moran MD  Active   Semglee,insulin glarg-yfgn,Pen 100 unit/mL (3 mL) Pen [id number] No INJECT 35 UNITS SUBCUTANEOUSLY AT BEDTIME   Patient not taking: Reported on 1/30/2025    Mike Moran MD Not Taking Active   spironolactone (Aldactone) 25 mg tablet [id number] Yes Take 1 tablet (25 mg) by mouth once daily. Jose Antonio Maynard MD 1/30/2025 Active     Discontinued 01/28/25 0914   tadalafil (Cialis) 5 mg tablet [id number] Yes Take 1 tablet (5 mg) by mouth once daily. Take one tablet and if not effective, ok to increase to 10mg prn Mike Moran MD 1/30/2025 Active                         PAT ROS:   Constitutional:   neg    Neuro/Psych:   neg    Eyes:   neg    Ears:    Recent L ear infection/scratch  neg    Nose:   neg    Mouth:   neg    Throat:   neg    Neck:   neg    Cardio:   neg    Respiratory:   neg    Endocrine:   neg    GI:   neg    :   neg    Musculoskeletal:    See HPI   arthralgias  Hematologic:   neg    Skin:  neg        Physical Exam  Constitutional:       Appearance: Normal appearance.   HENT:      Head: Normocephalic.      Comments: L ear has cotton     Nose: Nose normal.      Mouth/Throat:      Mouth: Mucous membranes are moist.      Pharynx: Oropharynx is clear.   Eyes:      Extraocular Movements: Extraocular movements intact.      Conjunctiva/sclera: Conjunctivae normal.      Pupils: Pupils are equal, round, and reactive to light.   Cardiovascular:      Rate and Rhythm: Normal rate and " "regular rhythm.      Pulses: Normal pulses.      Heart sounds: Normal heart sounds.   Pulmonary:      Effort: Pulmonary effort is normal.      Breath sounds: Normal breath sounds.   Abdominal:      General: Bowel sounds are normal.      Palpations: Abdomen is soft.   Musculoskeletal:         General: Normal range of motion.      Cervical back: Normal range of motion and neck supple.   Skin:     General: Skin is warm and dry.      Capillary Refill: Capillary refill takes 2 to 3 seconds.   Neurological:      General: No focal deficit present.      Mental Status: He is alert and oriented to person, place, and time.   Psychiatric:         Mood and Affect: Mood normal.         Behavior: Behavior normal.          PAT AIRWAY:   Airway:     Mallampati::  II    TM distance::  >3 FB    Neck ROM::  Full    Visit Vitals  /82   Pulse 64   Temp 35.8 °C (96.4 °F) (Temporal)   Resp 18   Ht 1.727 m (5' 8\")   Wt 89.2 kg (196 lb 10.4 oz)   SpO2 95%   BMI 29.90 kg/m²   Smoking Status Never   BSA 2.07 m²           DASI Risk Score      Flowsheet Row Pre-Admission Testing from 1/30/2025 in [facility] [place]   Can you take care of yourself (eat, dress, bathe, or use toilet)?  2.75 filed at 01/30/2025 1038   Can you walk indoors, such as around your house? 1.75 filed at 01/30/2025 1038   Can you walk a block or two on level ground?  2.75 filed at 01/30/2025 1038   Can you climb a flight of stairs or walk up a hill? 5.5 filed at 01/30/2025 1038   Can you run a short distance? 0 filed at 01/30/2025 1038   Can you do light work around the house like dusting or washing dishes? 2.7 filed at 01/30/2025 1038   Can you do moderate work around the house like vacuuming, sweeping floors or carrying groceries? 3.5 filed at 01/30/2025 1038   Can you do heavy work around the house like scrubbing floors or lifting and moving heavy furniture?  8 filed at 01/30/2025 1038   Can you do yard work like raking leaves, weeding or pushing a mower? " 4.5 filed at 01/30/2025 1038   Can you have sexual relations? 5.25 filed at 01/30/2025 1038   Can you participate in moderate recreational activities like golf, bowling, dancing, doubles tennis or throwing a baseball or football? 0 filed at 01/30/2025 1038   Can you participate in strenous sports like swimming, singles tennis, football, basketball, or skiing? 0 filed at 01/30/2025 1038   DASI SCORE 36.7 filed at 01/30/2025 1038   METS Score (Will be calculated only when all the questions are answered) 7.3 filed at 01/30/2025 1038          Caprini DVT Assessment      Flowsheet Row Pre-Admission Testing from 1/30/2025 in [facility]   DVT Score (IF A SCORE IS NOT CALCULATING, MUST SELECT A BMI TO COMPLETE) 4 filed at 01/30/2025 1050   Surgical Factors Minor surgery planned filed at 01/30/2025 1050   BMI (BMI MUST BE CHOSEN) 30 or less filed at 01/30/2025 1050          Modified Frailty Index    No data to display       CHADS2 Stroke Risk  Current as of about an hour ago        8.5% 3 to 100%: High Risk   2 to < 3%: Medium Risk   0 to < 2%: Low Risk     No Change          This score determines the patient's risk of having a stroke if the patient has atrial fibrillation.          Points Metrics   1 Has Congestive Heart Failure:  Yes     Patients with congestive heart failure get 1 point.    Current as of about an hour ago   1 Has Hypertension:  Yes     Patients with hypertension get 1 point.    Current as of about an hour ago   1 Age:  74     Patients who are 75 years of age or older get 1 point.    Current as of about an hour ago   1 Has Diabetes Excluding Gestational Diabetes:  Yes     Patients with diabetes get 1 point.    Current as of about an hour ago   0 Had Stroke:  No  Had TIA:  No  Had Thromboembolism:  No     Patients who have had a stroke, TIA, or thromboembolism get 2 points.    Current as of about an hour ago             Revised Cardiac Risk Index      Flowsheet Row Pre-Admission Testing from  1/30/2025 in [facility]   High-Risk Surgery (Intraperitoneal, Intrathoracic,Suprainguinal vascular) 0 filed at 01/30/2025 1051   History of ischemic heart disease (History of MI, History of positive exercuse test, Current chest paint considered due to myocardial ischemia, Use of nitrate therapy, ECG with pathological Q Waves) 0 filed at 01/30/2025 1051   History of congestive heart failure (pulmonary edemia, bilateral rales or S3 gallop, Paroxysmal nocturnal dyspnea, CXR showing pulmonary vascular redistribution) 0 filed at 01/30/2025 1051   History of cerebrovascular disease (Prior TIA or stroke) 0 filed at 01/30/2025 1051   Pre-operative insulin treatment 0 filed at 01/30/2025 1051   Pre-operative creatinine>2 mg/dl 0 filed at 01/30/2025 1051   Revised Cardiac Risk Calculator 0 filed at 01/30/2025 1051          Apfel Simplified Score      Flowsheet Row Pre-Admission Testing from 1/30/2025 in [facility]   Smoking status 1 filed at 01/30/2025 1051   History of motion sickness or PONV  0 filed at 01/30/2025 1051   Use of postoperative opioids 0 filed at 01/30/2025 1051   Gender - Female 0=No filed at 01/30/2025 1051   Apfel Simplified Score Calculator 1 filed at 01/30/2025 1051          Risk Analysis Index Results This Encounter    No data found in the last 10 encounters.       Stop Bang Score      Flowsheet Row Pre-Admission Testing from 1/30/2025 in [facility]   Do you snore loudly? 1 filed at 01/30/2025 1037   Do you often feel tired or fatigued after your sleep? 0 filed at 01/30/2025 1037   Has anyone ever observed you stop breathing in your sleep? 0 filed at 01/30/2025 1037   Do you have or are you being treated for high blood pressure? 1 filed at 01/30/2025 1037   Recent BMI (Calculated) 29.9 filed at 01/30/2025 1037   Is BMI greater than 35 kg/m2? 0=No filed at 01/30/2025 1037   Age older than 50 years old? 1=Yes filed at 01/30/2025 1037   Is your neck  circumference greater than 17 inches (Male) or 16 inches (Female)? 0 filed at 01/30/2025 1037   Gender - Male 1=Yes filed at 01/30/2025 1037   STOP-BANG Total Score 4 filed at 01/30/2025 1037          Prodigy: High Risk  Total Score: 27              Prodigy Age Score      Prodigy Gender Score     Prodigy CHF score          ARISCAT Score for Postoperative Pulmonary Complications      Flowsheet Row Pre-Admission Testing from 1/30/2025 in [facility]   Age Calculated Score 3 filed at 01/30/2025 1051   Preoperative SpO2 8 filed at 01/30/2025 1051   Respiratory infection in the last month Either upper or lower (i.e., URI, bronchitis, pneumonia), with fever and antibiotic treatment 0 filed at 01/30/2025 1051   Preoperative anemia (Hgb less than 10 g/dl) 0 filed at 01/30/2025 1051   Surgical incision  0 filed at 01/30/2025 1051   Duration of surgery  0 filed at 01/30/2025 1051   Emergency Procedure  0 filed at 01/30/2025 1051   ARISCAT Total Score  11 filed at 01/30/2025 1051          Jose Carlos Perioperative Risk for Myocardial Infarction or Cardiac Arrest (DALY)      Flowsheet Row Pre-Admission Testing from 1/30/2025 in [facility]   Calculated Age Score 1.5 filed at 01/30/2025 1052   Functional Status  0 filed at 01/30/2025 1052   ASA Class  -1.92 filed at 01/30/2025 1052   Creatinine 0.61 filed at 01/30/2025 1052   Type of Procedure  0.80 filed at 01/30/2025 1052   DALY Total Score  -4.26 filed at 01/30/2025 1052   DALY % 1.39 filed at 01/30/2025 1052          Assessment and Plan   73 yo male presents to [facility] for risk assessment and preoperative optimization in advance of his carpal tunnel surgery    Today his VS are stable, he is afebrile and pulse ox is 95% on room air at rest    Neuro:  No diagnosis or significant findings on chart review or clinical presentation and evaluation.   The patient is at an increased risk for post operative delirium secondary to age >/= 65.  Preoperative brain  "exercise  discussed with patient. The patient is at an increased risk for perioperative stroke secondary age and co morbidities    HEENT/Airway:  He is on ear drops, saw his PCP recently for L ear pain    Cardiovascular:  Denies chest pain, shortness of breathe, dizziness, palpations, or lightheadedness    HTN- BP is stable  Cont. Amlodipine as prescribed  Cont. Lasix as prescribed but hold day of surgery   Cont Lisinopril as prescribed but last dose 24 hr before surgery  Cont spironolactone as prescribed    CAD s/p CABG  Cont. Plavix per Cards ok to hold preop. Last dose today 1/30/25  Cont ASA w/o interruption  Cont Metoprolol as prescribed    HPL- has not had a recent lipid panel  Cont Atorvastatin as prescribed  Lab Results   Component Value Date    CHOL 86 07/18/2023    CHOL 117 10/18/2022    CHOL 129 10/04/2022     Lab Results   Component Value Date    HDL 36.2 (A) 07/18/2023    HDL 33.7 (A) 10/18/2022    HDL 43.1 10/04/2022     No results found for: \"LDLCALC\"  Lab Results   Component Value Date    TRIG 155 (H) 07/18/2023    TRIG 215 (H) 10/18/2022    TRIG 139 10/04/2022     No components found for: \"CHOLHDL\"      Seen by Cardiology PA Kylee Fallon on 1/27/2025 for preoperative CV evaluation. Per her note:  Patel Goncalves is a 74-year-old male with a past medical history of hypertension, CAD status post CABG x 4 (LIMA to LAD, vein graft to OM1 and OM 2, vein graft to PLB) in November 2022, chronic ischemic heart disease, mild left ventricular systolic dysfunction who presents today for preoperative cardiovascular evaluation prior to left carpal tunnel surgery.     1.  Preoperative cardiovascular evaluation  2.  Chronic, stable ischemic heart disease: CAD status post  CABG x 4 in 2022  3.  Chronic ischemic cardiomyopathy, HFrEF (left ventricular systolic function 45%)  4.  Hypertension     Blood pressure is improved on current regimen.  Patient is able to complete greater than 2 METS of activity without " any anginal symptoms.  EKG is stable.  Euvolemic on exam without recent heart failure symptoms and/or exacerbation.  Patient is intermediate risk for perioperative complications given past medical history.  Though disease and symptoms are stable at this time.  He may proceed with procedure without additional cardiac testing at this time.  Please continue guideline directed medical therapy for stable ischemic heart disease including aspirin 81 mg daily, clopidogrel 75 mg daily, atorvastatin 80 mg nightly, metoprolol succinate 200 mg daily.  Continue guideline directed medical therapy for heart failure including Toprol 200 mg daily, lisinopril 40 mg daily, spironolactone 25 mg daily, Lasix 40 mg daily.  Return to care for routine follow-up in 1 year.        Kylee Fallon PA-C    METS are  7.3, RCRI is 0 (0.4% risk for 30 day postoperative MACE)  DALY indicates a 1.39% risk of intraoperative or 30 day postoperative MACE  No additional preoperative testing is currently indicated.    Last Cardiology Tests:  ECG:  ECG 12 lead (Clinic Performed) 01/27/2025 (Preliminary) independently reviewed, normal sinus rhythm with occasional PVCs, left bundle branch block consistent with previous.     Echo:  Transthoracic echocardiogram (03/07/2023):  CONCLUSIONS:  1. Left ventricular systolic function is mildly to moderately decreased with a 40-45% estimated ejection fraction.  2. Abnormal septal motion consistent with post-operative status.  3. Spectral Doppler shows an abnormal pattern of left ventricular diastolic filling.  4. There is low normal right ventricular systolic function.  5. Mild aortic valve stenosis.  6. There is mild mitral and tricuspid regurgitation.  7. The estimated pulmonary artery pressure is mildly elevated with the RVSP at 36.4 mmHg.     Transthoracic Echocardiogram (10/18/2022):  CONCLUSIONS:   1. Left ventricular systolic function is mildly decreased with a 40-45% estimated ejection fraction.   2.  "Spectral Doppler shows an impaired relaxation pattern of left ventricular diastolic filling.   3. Mild aortic valve stenosis.   4. There is mild mitral, tricuspid and pulmonic regurgitation.   5. There is global hypokinesis of the left ventricle with minor regional variations.     Ejection Fractions:  No results found for: \"EF\"     Cath:  Left heart cath (10/18/2022):  CONCLUSIONS:   1. Multivessel disease as noted above.   2. Findings conveyed to Eugenio Zazueta and Dari Vizcaino.   3. GDMT for ACS and HF.     Stress Test:  Cardiac stress test (2/21/2023):  Summary:  1. No clinical evidence for ischemia at a submaximal workload.  2. The submaximal level of stress was achieved.  3. This exercise test is indeterminate because of an abnormal baseline ECG: LBBB .  4. OK for CV rehab.    Pulmonary:  No diagnosis or significant findings on chart review or clinical presentation and evaluation.     PRODIGY: High risk for opioid induced respiratory depression  Discussed smoking cessation and deep breathing handout given    Renal:   Stage 3 a CKD  3/26/24 saw Nephrology Dr Dr Johnson  Stable  Pt at High risk for perioperative SULMA based on Dynamic Predictive Scoring Tool for Perioperative SULMA  Lab Results   Component Value Date    GLUCOSE 296 (H) 11/11/2024    CALCIUM 9.5 11/11/2024     11/11/2024    K 4.4 11/11/2024    CO2 28 11/11/2024     11/11/2024    BUN 29 (H) 11/11/2024    CREATININE 1.59 (H) 11/11/2024       Endocrine:  Diabetes  See A1c below  Cont Farixag as prescribed last dose 3 days before surgery   Lab Results   Component Value Date    HGBA1C 8.4 (A) 11/06/2024       Hematologic:  H/O anemia  Lab Results   Component Value Date    WBC 7.8 03/21/2024    HGB 16.5 03/21/2024    HCT 50.8 03/21/2024    MCV 90 03/21/2024     03/21/2024       CAPRINI SCORE 4    Pt supplied education/VTE handout    Gastrointestinal:   No diagnosis or significant findings on chart review or clinical presentation and " evaluation.   EAT-10 score of 0 - self-perceived oropharyngeal dysphagia scale (0-40)      :  No diagnosis or significant findings on chart review or clinical presentation and evaluation.     Infectious disease:   No diagnosis or significant findings on chart review or clinical presentation and evaluation.       Musculoskeletal:   See HPI  Last dose of NSAIDS 11/30/25  Instructed if  no issues with your liver you may take Tylenol 2-500 mg tablets every 8 hrs around the clock for pain including morning of surgery with a sip of water    Preoperative risk assessment  ASA III  NSQIP - Predicted length of stay days0  PAT Testing -  bmp, cbc    Anesthesia:  No anesthesia complications    manuelito dental issues  Smoker-denies  Drugs-denies  ETOH-denies  denies personal/family issues with Anesthesia  No nickel, shell fish, or iodine allergies    Face to Face patient contact time 45    CEZAR Billy 1/30/2025 11:18 AM

## 2025-02-05 DIAGNOSIS — E11.9 TYPE 2 DIABETES MELLITUS WITHOUT COMPLICATION, WITH LONG-TERM CURRENT USE OF INSULIN (MULTI): ICD-10-CM

## 2025-02-05 DIAGNOSIS — Z79.4 TYPE 2 DIABETES MELLITUS WITHOUT COMPLICATION, WITH LONG-TERM CURRENT USE OF INSULIN (MULTI): ICD-10-CM

## 2025-02-05 RX ORDER — PEN NEEDLE, DIABETIC 30 GX3/16"
NEEDLE, DISPOSABLE MISCELLANEOUS
Qty: 400 EACH | Refills: 0 | Status: SHIPPED | OUTPATIENT
Start: 2025-02-05

## 2025-02-07 ENCOUNTER — ANESTHESIA (OUTPATIENT)
Dept: OPERATING ROOM | Facility: HOSPITAL | Age: 75
End: 2025-02-07
Payer: MEDICARE

## 2025-02-07 ENCOUNTER — HOSPITAL ENCOUNTER (OUTPATIENT)
Facility: HOSPITAL | Age: 75
Setting detail: OUTPATIENT SURGERY
Discharge: HOME | End: 2025-02-07
Attending: ORTHOPAEDIC SURGERY | Admitting: ORTHOPAEDIC SURGERY
Payer: MEDICARE

## 2025-02-07 ENCOUNTER — ANESTHESIA EVENT (OUTPATIENT)
Dept: OPERATING ROOM | Facility: HOSPITAL | Age: 75
End: 2025-02-07
Payer: MEDICARE

## 2025-02-07 VITALS
WEIGHT: 196.65 LBS | OXYGEN SATURATION: 97 % | HEIGHT: 68 IN | DIASTOLIC BLOOD PRESSURE: 74 MMHG | RESPIRATION RATE: 18 BRPM | TEMPERATURE: 98.1 F | BODY MASS INDEX: 29.8 KG/M2 | HEART RATE: 69 BPM | SYSTOLIC BLOOD PRESSURE: 149 MMHG

## 2025-02-07 DIAGNOSIS — G56.02 CARPAL TUNNEL SYNDROME ON LEFT: Primary | ICD-10-CM

## 2025-02-07 PROCEDURE — 3600000008 HC OR TIME - EACH INCREMENTAL 1 MINUTE - PROCEDURE LEVEL THREE: Performed by: ORTHOPAEDIC SURGERY

## 2025-02-07 PROCEDURE — 64721 CARPAL TUNNEL SURGERY: CPT | Performed by: ORTHOPAEDIC SURGERY

## 2025-02-07 PROCEDURE — 7100000010 HC PHASE TWO TIME - EACH INCREMENTAL 1 MINUTE: Performed by: ORTHOPAEDIC SURGERY

## 2025-02-07 PROCEDURE — 2500000005 HC RX 250 GENERAL PHARMACY W/O HCPCS: Performed by: ORTHOPAEDIC SURGERY

## 2025-02-07 PROCEDURE — 2500000004 HC RX 250 GENERAL PHARMACY W/ HCPCS (ALT 636 FOR OP/ED): Performed by: ORTHOPAEDIC SURGERY

## 2025-02-07 PROCEDURE — 7100000009 HC PHASE TWO TIME - INITIAL BASE CHARGE: Performed by: ORTHOPAEDIC SURGERY

## 2025-02-07 PROCEDURE — 3600000003 HC OR TIME - INITIAL BASE CHARGE - PROCEDURE LEVEL THREE: Performed by: ORTHOPAEDIC SURGERY

## 2025-02-07 RX ORDER — CEFAZOLIN SODIUM 2 G/100ML
2 INJECTION, SOLUTION INTRAVENOUS ONCE
Status: DISCONTINUED | OUTPATIENT
Start: 2025-02-07 | End: 2025-02-07 | Stop reason: HOSPADM

## 2025-02-07 RX ORDER — BUPIVACAINE HYDROCHLORIDE 5 MG/ML
INJECTION, SOLUTION PERINEURAL AS NEEDED
Status: DISCONTINUED | OUTPATIENT
Start: 2025-02-07 | End: 2025-02-07 | Stop reason: HOSPADM

## 2025-02-07 RX ORDER — ALBUTEROL SULFATE 0.83 MG/ML
2.5 SOLUTION RESPIRATORY (INHALATION) ONCE AS NEEDED
OUTPATIENT
Start: 2025-02-07

## 2025-02-07 RX ORDER — LIDOCAINE HYDROCHLORIDE 10 MG/ML
INJECTION, SOLUTION INFILTRATION; PERINEURAL AS NEEDED
Status: DISCONTINUED | OUTPATIENT
Start: 2025-02-07 | End: 2025-02-07 | Stop reason: HOSPADM

## 2025-02-07 RX ORDER — ONDANSETRON HYDROCHLORIDE 2 MG/ML
4 INJECTION, SOLUTION INTRAVENOUS ONCE AS NEEDED
OUTPATIENT
Start: 2025-02-07

## 2025-02-07 RX ORDER — BACITRACIN ZINC 500 UNIT/G
OINTMENT IN PACKET (EA) TOPICAL AS NEEDED
Status: DISCONTINUED | OUTPATIENT
Start: 2025-02-07 | End: 2025-02-07 | Stop reason: HOSPADM

## 2025-02-07 RX ORDER — MEPERIDINE HYDROCHLORIDE 25 MG/ML
12.5 INJECTION INTRAMUSCULAR; INTRAVENOUS; SUBCUTANEOUS EVERY 10 MIN PRN
OUTPATIENT
Start: 2025-02-07

## 2025-02-07 RX ORDER — HYDRALAZINE HYDROCHLORIDE 20 MG/ML
5 INJECTION INTRAMUSCULAR; INTRAVENOUS EVERY 30 MIN PRN
OUTPATIENT
Start: 2025-02-07

## 2025-02-07 RX ORDER — LABETALOL HYDROCHLORIDE 5 MG/ML
5 INJECTION, SOLUTION INTRAVENOUS ONCE AS NEEDED
OUTPATIENT
Start: 2025-02-07

## 2025-02-07 RX ORDER — DOXYCYCLINE 100 MG/1
100 CAPSULE ORAL 2 TIMES DAILY
Qty: 14 CAPSULE | Refills: 0 | Status: SHIPPED | OUTPATIENT
Start: 2025-02-07 | End: 2025-02-14

## 2025-02-07 RX ORDER — FENTANYL CITRATE 50 UG/ML
50 INJECTION, SOLUTION INTRAMUSCULAR; INTRAVENOUS EVERY 5 MIN PRN
OUTPATIENT
Start: 2025-02-07

## 2025-02-07 RX ORDER — HYDROMORPHONE HYDROCHLORIDE 0.2 MG/ML
0.2 INJECTION INTRAMUSCULAR; INTRAVENOUS; SUBCUTANEOUS EVERY 5 MIN PRN
OUTPATIENT
Start: 2025-02-07

## 2025-02-07 SDOH — HEALTH STABILITY: MENTAL HEALTH: CURRENT SMOKER: 0

## 2025-02-07 ASSESSMENT — PAIN DESCRIPTION - DESCRIPTORS
DESCRIPTORS: NUMBNESS
DESCRIPTORS: NUMBNESS

## 2025-02-07 ASSESSMENT — PAIN - FUNCTIONAL ASSESSMENT
PAIN_FUNCTIONAL_ASSESSMENT: 0-10

## 2025-02-07 ASSESSMENT — COLUMBIA-SUICIDE SEVERITY RATING SCALE - C-SSRS
2. HAVE YOU ACTUALLY HAD ANY THOUGHTS OF KILLING YOURSELF?: NO
6. HAVE YOU EVER DONE ANYTHING, STARTED TO DO ANYTHING, OR PREPARED TO DO ANYTHING TO END YOUR LIFE?: NO
1. IN THE PAST MONTH, HAVE YOU WISHED YOU WERE DEAD OR WISHED YOU COULD GO TO SLEEP AND NOT WAKE UP?: NO

## 2025-02-07 ASSESSMENT — PAIN SCALES - GENERAL
PAINLEVEL_OUTOF10: 0 - NO PAIN

## 2025-02-07 NOTE — ANESTHESIA PREPROCEDURE EVALUATION
"Patient: Patel Goncalves \"Tatyana\"    Procedure Information       Date/Time: 02/07/25 0830    Procedure: **Not 1st Case** DECOMPRESSION, NERVE, MEDIAN (Left: Hand)    Location: [facility] / [facility]    Surgeons: Esteban Palumbo MD            Relevant Problems   Cardiac   (+) Atrial fibrillation (Multi)   (+) 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 pectoris   (+) Other hyperlipidemia   (+) Uncontrolled hypertension      Neuro   (+) Carpal tunnel syndrome on left      Endocrine   (+) Diabetes mellitus, type II (Multi)      Hematology   (+) Anemia   (+) Iron deficiency anemia      Musculoskeletal   (+) Carpal tunnel syndrome on left   (+) Gout, arthritis       Clinical information reviewed:                   NPO Detail:  No data recorded     Physical Exam    Airway  Mallampati: II  TM distance: >3 FB  Neck ROM: full     Cardiovascular   Comments: deferred   Dental    Pulmonary   Comments: deferred   Abdominal     Comments: deferred           Anesthesia Plan    History of general anesthesia?: yes  History of complications of general anesthesia?: no    ASA 3     The patient is not a current smoker.  Education provided regarding risk of obstructive sleep apnea.  intravenous induction   Postoperative administration of opioids is intended.  Anesthetic plan and risks discussed with patient.    Plan discussed with [facility].    This case was rescheduled as a local at the last minute.  "

## 2025-02-07 NOTE — DISCHARGE INSTRUCTIONS
You had carpal tunnel surgery today, due to local anesthesia the hand may be numb for a few hours after surgery, this is very normal and should slowly wear off.    Some swelling and bruising in the forearm and palm is very normal after surgery.    Keep dressing on hand for 2-3 days, after that you may take dressing off and put a band-aid over wound.    DO NOT get wound wet for 5 days post-op, after that you can shower/wash hand and get wound wet, pat dry.    If able, take 800mg Ibuprofen and/or Tylenol for pain after surgery if needed.    Use the hand for activities as tolerated, goal is to regain full digital and wrist motion as quickly as possible but avoid heavy lifting until advised.    While some symptoms may improve quickly after surgery, some symptoms take time to slowly improve and the results can be very variable.    Follow up in the office by calling [phone] in 10-13 days for a post-op appt

## 2025-02-07 NOTE — OP NOTE
"**Not 1st Case** DECOMPRESSION, NERVE, MEDIAN (L) Operative Note     Date: 2025  OR Location: [facility]    Name: Patel Goncalves \"Tatyana\", : 1950, Age: 74 y.o., MRN: [id number], Sex: male    Diagnosis  Pre-op Diagnosis      * Carpal tunnel syndrome on left [G56.02] Post-op Diagnosis     * Carpal tunnel syndrome on left [G56.02]     Procedures  **Not 1st Case** DECOMPRESSION, NERVE, MEDIAN  [id number] - AK NEUROPLASTY &/TRANSPOS MEDIAN NRV CARPAL TUNNE    Left open carpal tunnel release  Surgeons      * Esteban Palumbo - Primary    Resident/Fellow/Other Assistant:  Surgeons and Role:  * No surgeons found with a matching role *    Staff:   Circulator: Romana Branham Person: Clara Branham Person: Kelsey  Circulator: Leila    Anesthesia Staff: Anesthesiologist: Daren Montes De Oca MD  C-AA: PEG Pierson    Procedure Summary  Anesthesia: Monitor Anesthesia Care  ASA: III  Estimated Blood Loss: 2mL  Intra-op Medications: Administrations occurring from 0830 to 0923 on 25:  * No intraprocedure medications in log *           Anesthesia Record               Intraprocedure I/O Totals       None           Specimen: No specimens collected              Drains and/or Catheters: * None in log *    Tourniquet Times:         Implants:     Findings: Moderate nerve swelling    Indications: Patel Goncalves \"Mckenna" is an 74 y.o. male who is having surgery for Carpal tunnel syndrome on left [G56.02].  Does not gentleman comes in today for carpal tunnel lease understand clear there are severe risk such as nerve artery tendon damage infection continued pain possibly for future surgery and infection understands goal of surgery is to decrease the compression on the nerve recovery can be quite variable.  Answered all preoperative questions informed consent obtained    The patient was seen in the preoperative area. The risks, benefits, complications, treatment options, non-operative alternatives, expected recovery and outcomes were " discussed with the patient. The possibilities of reaction to medication, pulmonary aspiration, injury to surrounding structures, bleeding, recurrent infection, the need for additional procedures, failure to diagnose a condition, and creating a complication requiring transfusion or operation were discussed with the patient. The patient concurred with the proposed plan, giving informed consent.  The site of surgery was properly noted/marked if necessary per policy. The patient has been actively warmed in preoperative area. Preoperative antibiotics are not indicated. Venous thrombosis prophylaxis are not indicated.    Procedure Details: Pleasant patient brought the operating room and after sterilely prepping draping and performing timeout we placed abundant local case was done under local.  Made a 3 and half centimeter incision after raising the tourniquet to 250 mmHg went down through skin bluntly spreading on the ligament coagulated all bleeders we opened up the ligament sharply in its central aspect and released the distal aspects of superficial fat and proximally to greater than 4 cm of forearm fascia a light adhesion was released otherwise no abnormality there was a median artery.  No other constriction or abnormalities copious irrigated closed wound light compressive dressing was placed fingers pinked up nicely no complications  Complications:  None; patient tolerated the procedure well.    Disposition: PACU - hemodynamically stable.  Condition: stable                 Additional Details: 0    Attending Attestation: I performed the procedure.    Esteban Palumbo  Phone Number: [phone]

## 2025-02-10 ASSESSMENT — PAIN SCALES - GENERAL: PAINLEVEL_OUTOF10: 0 - NO PAIN

## 2025-02-18 ENCOUNTER — OFFICE VISIT (OUTPATIENT)
Dept: ORTHOPEDIC SURGERY | Facility: CLINIC | Age: 75
End: 2025-02-18
Payer: MEDICARE

## 2025-02-18 DIAGNOSIS — G56.02 CARPAL TUNNEL SYNDROME ON LEFT: Primary | ICD-10-CM

## 2025-02-18 PROCEDURE — 1157F ADVNC CARE PLAN IN RCRD: CPT | Performed by: ORTHOPAEDIC SURGERY

## 2025-02-18 PROCEDURE — 99211 OFF/OP EST MAY X REQ PHY/QHP: CPT | Performed by: ORTHOPAEDIC SURGERY

## 2025-02-18 PROCEDURE — 4010F ACE/ARB THERAPY RXD/TAKEN: CPT | Performed by: ORTHOPAEDIC SURGERY

## 2025-02-18 PROCEDURE — L3908 WHO COCK-UP NONMOLDE PRE OTS: HCPCS | Performed by: ORTHOPAEDIC SURGERY

## 2025-02-18 PROCEDURE — 1159F MED LIST DOCD IN RCRD: CPT | Performed by: ORTHOPAEDIC SURGERY

## 2025-02-18 ASSESSMENT — PAIN - FUNCTIONAL ASSESSMENT: PAIN_FUNCTIONAL_ASSESSMENT: 0-10

## 2025-02-18 ASSESSMENT — PAIN SCALES - GENERAL: PAINLEVEL_OUTOF10: 0 - NO PAIN

## 2025-02-18 NOTE — PROGRESS NOTES
Reason for Appointment  Chief Complaint   Patient presents with    Left Wrist - Post-op     History of Present Illness  Patient is here today 2 weeks s/p a left carpal tunnel release on 2/7/25. Patient is doing well overall.  He has not seen much improvement in terms of numbness in the fingers but he does understand it may take months to see slow nerve improvement.  Wound is healing nicely with no signs of infection, sutures were removed today.  He has normal postoperative pain and swelling and will slowly increase activity with the hand.  We be happy to see him back at any point for any further issues.    Assessment   Left carpal tunnel

## 2025-03-05 ENCOUNTER — APPOINTMENT (OUTPATIENT)
Dept: PRIMARY CARE | Facility: CLINIC | Age: 75
End: 2025-03-05
Payer: MEDICARE

## 2025-03-05 VITALS
WEIGHT: 199 LBS | HEIGHT: 68 IN | RESPIRATION RATE: 18 BRPM | HEART RATE: 56 BPM | DIASTOLIC BLOOD PRESSURE: 78 MMHG | BODY MASS INDEX: 30.16 KG/M2 | OXYGEN SATURATION: 95 % | SYSTOLIC BLOOD PRESSURE: 129 MMHG

## 2025-03-05 DIAGNOSIS — E78.49 OTHER HYPERLIPIDEMIA: ICD-10-CM

## 2025-03-05 DIAGNOSIS — Z00.00 ENCOUNTER FOR MEDICARE ANNUAL WELLNESS EXAM: Primary | ICD-10-CM

## 2025-03-05 DIAGNOSIS — I25.119 CORONARY ARTERY DISEASE INVOLVING NATIVE CORONARY ARTERY OF NATIVE HEART WITH ANGINA PECTORIS: ICD-10-CM

## 2025-03-05 DIAGNOSIS — Z12.11 ENCOUNTER FOR SCREENING FOR MALIGNANT NEOPLASM OF COLON: ICD-10-CM

## 2025-03-05 DIAGNOSIS — I48.91 ATRIAL FIBRILLATION, UNSPECIFIED TYPE (MULTI): ICD-10-CM

## 2025-03-05 DIAGNOSIS — E11.9 TYPE 2 DIABETES MELLITUS WITHOUT COMPLICATION, WITH LONG-TERM CURRENT USE OF INSULIN (MULTI): ICD-10-CM

## 2025-03-05 DIAGNOSIS — I10 UNCONTROLLED HYPERTENSION: ICD-10-CM

## 2025-03-05 DIAGNOSIS — Z79.4 TYPE 2 DIABETES MELLITUS WITHOUT COMPLICATION, WITH LONG-TERM CURRENT USE OF INSULIN (MULTI): ICD-10-CM

## 2025-03-05 LAB — POC HEMOGLOBIN A1C: 8.4 % (ref 4.2–6.5)

## 2025-03-05 PROCEDURE — 1126F AMNT PAIN NOTED NONE PRSNT: CPT | Performed by: INTERNAL MEDICINE

## 2025-03-05 PROCEDURE — 83036 HEMOGLOBIN GLYCOSYLATED A1C: CPT | Performed by: INTERNAL MEDICINE

## 2025-03-05 PROCEDURE — 3074F SYST BP LT 130 MM HG: CPT | Performed by: INTERNAL MEDICINE

## 2025-03-05 PROCEDURE — 1159F MED LIST DOCD IN RCRD: CPT | Performed by: INTERNAL MEDICINE

## 2025-03-05 PROCEDURE — 1160F RVW MEDS BY RX/DR IN RCRD: CPT | Performed by: INTERNAL MEDICINE

## 2025-03-05 PROCEDURE — 1170F FXNL STATUS ASSESSED: CPT | Performed by: INTERNAL MEDICINE

## 2025-03-05 PROCEDURE — 1123F ACP DISCUSS/DSCN MKR DOCD: CPT | Performed by: INTERNAL MEDICINE

## 2025-03-05 PROCEDURE — G0439 PPPS, SUBSEQ VISIT: HCPCS | Performed by: INTERNAL MEDICINE

## 2025-03-05 PROCEDURE — 1157F ADVNC CARE PLAN IN RCRD: CPT | Performed by: INTERNAL MEDICINE

## 2025-03-05 PROCEDURE — 3078F DIAST BP <80 MM HG: CPT | Performed by: INTERNAL MEDICINE

## 2025-03-05 PROCEDURE — 1036F TOBACCO NON-USER: CPT | Performed by: INTERNAL MEDICINE

## 2025-03-05 PROCEDURE — 99213 OFFICE O/P EST LOW 20 MIN: CPT | Performed by: INTERNAL MEDICINE

## 2025-03-05 ASSESSMENT — PATIENT HEALTH QUESTIONNAIRE - PHQ9
SUM OF ALL RESPONSES TO PHQ9 QUESTIONS 1 AND 2: 1
10. IF YOU CHECKED OFF ANY PROBLEMS, HOW DIFFICULT HAVE THESE PROBLEMS MADE IT FOR YOU TO DO YOUR WORK, TAKE CARE OF THINGS AT HOME, OR GET ALONG WITH OTHER PEOPLE: SOMEWHAT DIFFICULT
1. LITTLE INTEREST OR PLEASURE IN DOING THINGS: NOT AT ALL
2. FEELING DOWN, DEPRESSED OR HOPELESS: SEVERAL DAYS

## 2025-03-05 ASSESSMENT — ACTIVITIES OF DAILY LIVING (ADL)
GROCERY_SHOPPING: INDEPENDENT
MANAGING_FINANCES: INDEPENDENT
BATHING: INDEPENDENT
TAKING_MEDICATION: INDEPENDENT
DRESSING: INDEPENDENT
DOING_HOUSEWORK: INDEPENDENT

## 2025-03-05 ASSESSMENT — PAIN SCALES - GENERAL: PAINLEVEL_OUTOF10: 0-NO PAIN

## 2025-03-05 NOTE — PROGRESS NOTES
"Patient ID:   Patel Goncalves \"Mckenna" is a 75 y.o. male with PMH remarkable for CAD s/p CABG x4 on 11/28/2022 with Dr Ewelina Trujillo, DM2, Gout, HTN, CKD who presents to the office today for Follow-up and Medicare Annual Wellness Visit Subsequent.    HEALTH MAINTENANCE: Annual Medicare Wellness Physical  Last Office Visit: 1/28/2025 for preop clearance for left medial nerve decompression with Dr Palumbo and c/o left earache. Given cipro/dexamethasone ear gtt's  Last Labs: CBC+BMP 1/30/2025, others on 3/21/2024  HgbA1c 8.6 on 11/6/2024 (was 9.2 prior) --> DUE  PSA Screening (starting at age 55 till 69): declines and now out of age range  Colonoscopy (45-75 or age 40 with 1st degree relative dx colon ca): done by Dr. Carey in [place], had small polyp removed in 2013. Repeat 10 yrs. DUE  Lung cancer screening (50-78 y/o x 20 pk yr for at least 20 yrs + current smoker OR quit in last 15 years, no CT w/I last year): no sizable nodules noted on scan from 2022.  ECHO 3/3/2023 showed LVSF 40-45%, septal motion consistent with postOp status, abnormal pattern of LVDF, low normal RVSF, mild AV stenosis. Mild MR, TR. PAP mildly elevated.     left carpal tunnel release on 2/7/25     States that he is doing okay. He states that he had Carpal Tunnel surgery done on his left wrist and has not noticed that much of a difference. He states that he is still having pain. He denies any issues with bowel or bladder. Denies any chest pain or shortness of breath. He states that he is eating and drinking okay.        Past Medical History:   Diagnosis Date    Arthritis     Bitten or stung by nonvenomous insect and other nonvenomous arthropods, initial encounter 10/21/2019    Tick bite    CHF (congestive heart failure)     CKD (chronic kidney disease)     Coronary artery disease     Cutaneous abscess of left upper limb 06/03/2015    Abscess of arm, left    Disorder of the skin and subcutaneous tissue, unspecified 07/30/2020    Changing skin lesion "    Essential hypertension 05/19/2010    Fatigue 06/27/2024    Heart disease     Hyperlipidemia     Joint pain     Myocardial infarction (Multi)     Personal history of other diseases of urinary system 01/28/2019    History of hematuria    Personal history of other specified conditions 04/16/2019    History of chronic fatigue    Prepatellar bursitis, unspecified knee 10/30/2018    Prepatellar bursitis    Renal glomerular disease 07/13/2023    Type 2 diabetes mellitus     Unspecified renal colic 01/28/2019    Kidney pain    Urethral stricture 06/27/2024      Past Surgical History:   Procedure Laterality Date    APPENDECTOMY      CARDIAC CATHETERIZATION      CARDIAC SURGERY      bypass    CARPAL TUNNEL RELEASE Left 02/2025    COLONOSCOPY      CORONARY ARTERY BYPASS GRAFT      TRIGGER FINGER RELEASE Left 2024      Social History     Tobacco Use    Smoking status: Never    Smokeless tobacco: Never   Vaping Use    Vaping status: Never Used   Substance Use Topics    Alcohol use: Yes     Comment: Occasional    Drug use: Never     Family History   Problem Relation Name Age of Onset    No Known Problems Mother      No Known Problems Father      Aortic aneurysm Brother      Diabetes Maternal Grandmother        Immunization History   Administered Date(s) Administered    Moderna SARS-CoV-2 Vaccination 03/29/2021, 04/29/2021     Review of Systems   Constitutional: Negative.    HENT: Negative.     Eyes: Negative.    Respiratory: Negative.     Cardiovascular: Negative.    Gastrointestinal: Negative.    Endocrine: Negative.    Genitourinary: Negative.    Musculoskeletal: Negative.         Left hand pain   Skin: Negative.    Neurological: Negative.    Psychiatric/Behavioral: Negative.     All other systems reviewed and are negative.    Allergies   Allergen Reactions    Bee Venom Protein (Honey Bee) Hives and Itching     Hives from Hornets    Naproxen Sodium Itching     Visit Vitals  /78   Pulse 56   Resp 18   Ht 1.727 m (5'  "7.99\")   Wt 90.3 kg (199 lb)   SpO2 95%   BMI 30.27 kg/m²   Smoking Status Never   BSA 2.08 m²     Physical Exam  Vitals reviewed. Exam conducted with a chaperone present.   Constitutional:       Appearance: Normal appearance. He is well-developed.   HENT:      Head: Normocephalic.      Right Ear: External ear normal.      Left Ear: External ear normal.      Nose: Nose normal.      Mouth/Throat:      Lips: Pink.      Mouth: Mucous membranes are moist.   Eyes:      General: Lids are normal.      Pupils: Pupils are equal, round, and reactive to light.   Neck:      Trachea: Trachea normal.   Cardiovascular:      Rate and Rhythm: Normal rate and regular rhythm.      Heart sounds: Normal heart sounds.   Pulmonary:      Effort: Pulmonary effort is normal.      Breath sounds: Normal breath sounds.   Abdominal:      General: Bowel sounds are normal.      Palpations: Abdomen is soft.   Musculoskeletal:         General: Normal range of motion.      Left wrist: Tenderness present.      Cervical back: Full passive range of motion without pain and normal range of motion.   Skin:     General: Skin is warm and moist.   Neurological:      General: No focal deficit present.      Mental Status: He is alert and oriented to person, place, and time. Mental status is at baseline.   Psychiatric:         Attention and Perception: Attention normal.         Mood and Affect: Mood normal.         Speech: Speech normal.         Behavior: Behavior is cooperative.         Thought Content: Thought content normal.         Cognition and Memory: Cognition normal.         Judgment: Judgment normal.       Current Outpatient Medications   Medication Instructions    amLODIPine (NORVASC) 10 mg, oral, Daily    aspirin 81 mg chewable tablet Daily    atorvastatin (LIPITOR) 80 mg, oral, Nightly    clopidogrel (PLAVIX) 75 mg, oral, Daily    dapagliflozin propanediol (FARXIGA) 10 mg, oral, Daily    flash glucose sensor kit (FreeStyle Evy 2 Sensor) kit USE AS " "DIRECTED -CHANGE  SENSOR  EVERY  14  DAYS    furosemide (LASIX) 40 mg, oral, Daily    insulin lispro (HumaLOG) 100 unit/mL injection INJECT 8 UNITS SUBCUTANEOUSLY 3 TIMES DAILY PLUS SLIDING SCALE. 151-200, 2 UNITS. 201-250, 4 UNITS. 251-300, 6 UNITS. 301-350, 8 UNITS. 351-400, 10 UNITS. MAX MEAL DOSE 18 UNITS. MAX DAILY 54 UNITS    Lantus Solostar U-100 Insulin 100 unit/mL (3 mL) pen INJECT 35 UNITS SUBCUTANEOUSLY AT BEDTIME    lisinopril 40 mg, Daily    metoprolol succinate XL (TOPROL-XL) 200 mg, oral, Daily    multivitamin tablet 1 tablet, Daily    pen needle, diabetic 31 gauge x 5/16\" needle USE 1 NEW PEN NEEDLE 4 TIMES DAILY    spironolactone (ALDACTONE) 25 mg, oral, Daily    tadalafil (CIALIS) 5 mg, oral, Daily, Take one tablet and if not effective, ok to increase to 10mg prn     Lab Results   Component Value Date    WBC 7.2 01/30/2025    HGB 17.2 01/30/2025    HCT 51.4 01/30/2025     01/30/2025    CHOL 86 07/18/2023    TRIG 155 (H) 07/18/2023    HDL 36.2 (A) 07/18/2023    ALT 32 08/14/2023    AST 24 08/14/2023     01/30/2025    K 4.8 01/30/2025     01/30/2025    CREATININE 1.49 (H) 01/30/2025    BUN 28 (H) 01/30/2025    CO2 27 01/30/2025    TSH 1.81 10/20/2022    INR 1.2 (H) 10/18/2022    HGBA1C 8.4 (A) 03/05/2025       Problem List Items Addressed This Visit             ICD-10-CM    Diabetes mellitus, type II (Multi) E11.9    Relevant Orders    POCT glycosylated hemoglobin (Hb A1C) manually resulted (Completed)    Uncontrolled hypertension I10     -BP was 129/78 today  - continue taking Farxiga, Spironolactone, lisinopril, Metoprolol, Amlodipine         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 pectoris I25.119     -condition stable  -Continue taking Plavix, ASA, and amlodipine          Other hyperlipidemia E78.49     -continue taking Lipitor 80 mg  -- Patient educated and counseled to do walking and exercise regularly  - Low-fat low-cholesterol " diet is advised  - Advised to reduce weight   - The goal is to keep the LDL less than 70, and HDL the more than 40           Atrial fibrillation (Multi) I48.91     -condition stable  -follow up with Cardiology as scheduled          Encounter for screening for malignant neoplasm of colon Z12.11    Relevant Orders    Colonoscopy Screening; Average Risk Patient     --------------------  Written by LAKESHIA PFEIFFER LPN, acting as a scribe for Dr. Hidalgo. This note accurately reflects the work and decisions made by Dr. Hidalgo.     I, Dr. Hidalgo, attest all medical record entries made by the scribe were under my direction and were personally dictated by me. I have reviewed the chart and agree that the record accurately reflects my performance of the history, physical exam, and assessment and plan.

## 2025-03-05 NOTE — PATIENT INSTRUCTIONS
It was nice to see you in the office today!  As discussed during our visit...    Your chronic conditions appear stable.  Call the office with any questions or concerns [phone]  Follow up in 4 months or sooner if needed.    Increase you Cialis to 10mg.     You are due for you Retinopathy Eye Screening.    I have placed a referral for you to have a colonoscopy with Dr. Tang at [facility].   Dr. Tang's office will reach out to you to either make an appointment for the colonoscopy OR for a consultation in the office first.     Dr Tang's Office Location:  [facility]  [address] [place] [address] [address]  [place] [address]  Appointments:   [phone]

## 2025-03-08 PROBLEM — Z12.11 ENCOUNTER FOR SCREENING FOR MALIGNANT NEOPLASM OF COLON: Status: ACTIVE | Noted: 2025-03-08

## 2025-03-08 ASSESSMENT — ENCOUNTER SYMPTOMS
NEUROLOGICAL NEGATIVE: 1
CARDIOVASCULAR NEGATIVE: 1
MUSCULOSKELETAL NEGATIVE: 1
GASTROINTESTINAL NEGATIVE: 1
CONSTITUTIONAL NEGATIVE: 1
RESPIRATORY NEGATIVE: 1
PSYCHIATRIC NEGATIVE: 1
ENDOCRINE NEGATIVE: 1
EYES NEGATIVE: 1

## 2025-03-08 ASSESSMENT — ACTIVITIES OF DAILY LIVING (ADL)
BATHING: INDEPENDENT
GROCERY_SHOPPING: INDEPENDENT
MANAGING_FINANCES: INDEPENDENT
DOING_HOUSEWORK: INDEPENDENT
DRESSING: INDEPENDENT
TAKING_MEDICATION: INDEPENDENT

## 2025-03-08 NOTE — ASSESSMENT & PLAN NOTE
-continue taking Lipitor 80 mg  -- Patient educated and counseled to do walking and exercise regularly  - Low-fat low-cholesterol diet is advised  - Advised to reduce weight   - The goal is to keep the LDL less than 70, and HDL the more than 40

## 2025-03-08 NOTE — ASSESSMENT & PLAN NOTE
-BP was 129/78 today  - continue taking Farxiga, Spironolactone, lisinopril, Metoprolol, Amlodipine   discussed w attending   discussed w attending  as well as ACP nikolay

## 2025-03-11 DIAGNOSIS — I25.810 CORONARY ARTERY DISEASE INVOLVING CORONARY BYPASS GRAFT OF NATIVE HEART, UNSPECIFIED WHETHER ANGINA PRESENT: ICD-10-CM

## 2025-03-11 RX ORDER — CLOPIDOGREL BISULFATE 75 MG/1
75 TABLET ORAL DAILY
Qty: 90 TABLET | Refills: 0 | Status: SHIPPED | OUTPATIENT
Start: 2025-03-11

## 2025-03-14 ENCOUNTER — APPOINTMENT (OUTPATIENT)
Dept: SURGERY | Facility: CLINIC | Age: 75
End: 2025-03-14
Payer: MEDICARE

## 2025-03-24 ENCOUNTER — APPOINTMENT (OUTPATIENT)
Dept: SURGERY | Facility: CLINIC | Age: 75
End: 2025-03-24
Payer: MEDICARE

## 2025-03-24 VITALS
BODY MASS INDEX: 30.01 KG/M2 | DIASTOLIC BLOOD PRESSURE: 89 MMHG | HEIGHT: 68 IN | TEMPERATURE: 98.2 F | SYSTOLIC BLOOD PRESSURE: 163 MMHG | HEART RATE: 69 BPM | WEIGHT: 198 LBS

## 2025-03-24 DIAGNOSIS — Z12.11 SCREEN FOR COLON CANCER: ICD-10-CM

## 2025-03-24 DIAGNOSIS — Z95.1 S/P CABG (CORONARY ARTERY BYPASS GRAFT): ICD-10-CM

## 2025-03-24 DIAGNOSIS — Z12.11 ENCOUNTER FOR SCREENING FOR MALIGNANT NEOPLASM OF COLON: Primary | ICD-10-CM

## 2025-03-24 PROCEDURE — 1123F ACP DISCUSS/DSCN MKR DOCD: CPT | Performed by: SURGERY

## 2025-03-24 PROCEDURE — 1159F MED LIST DOCD IN RCRD: CPT | Performed by: SURGERY

## 2025-03-24 PROCEDURE — 1036F TOBACCO NON-USER: CPT | Performed by: SURGERY

## 2025-03-24 PROCEDURE — 99203 OFFICE O/P NEW LOW 30 MIN: CPT | Performed by: SURGERY

## 2025-03-24 PROCEDURE — 1157F ADVNC CARE PLAN IN RCRD: CPT | Performed by: SURGERY

## 2025-03-24 PROCEDURE — 3079F DIAST BP 80-89 MM HG: CPT | Performed by: SURGERY

## 2025-03-24 PROCEDURE — 3077F SYST BP >= 140 MM HG: CPT | Performed by: SURGERY

## 2025-03-24 RX ORDER — POLYETHYLENE GLYCOL 3350, SODIUM SULFATE, POTASSIUM CHLORIDE, MAGNESIUM SULFATE, AND SODIUM CHLORIDE FOR ORAL SOLUTION 178.7-7.3G
2 KIT ORAL AS NEEDED
Qty: 2 EACH | Refills: 0 | Status: SHIPPED | OUTPATIENT
Start: 2025-03-24

## 2025-03-24 NOTE — PROGRESS NOTES
"  Patient ID: Patel Goncalves \"Mckenna" is a 75 y.o. male.  Here to discuss colonoscopy.      Subjective   HPI  Patient here to discuss colonoscopy.  Had a colonoscopy done many years ago.  No family Struve colon cancer.  No abdominal complaints.  On Plavix.  Review of Systems   All other systems reviewed and are negative.      Objective   Physical Exam  Constitutional:       Appearance: Normal appearance.   Cardiovascular:      Heart sounds: Normal heart sounds.   Pulmonary:      Breath sounds: Normal breath sounds.   Abdominal:      Palpations: Abdomen is soft. There is no mass.      Tenderness: There is no abdominal tenderness. There is no guarding.         Problem List Items Addressed This Visit       S/P CABG (coronary artery bypass graft)    Encounter for screening for malignant neoplasm of colon - Primary        Assessment/Plan   COLONOSCOPY/BIOPSIES 6.30.25 .  Risks include, but not limited to pain, infection, bleeding, perforation, missed lesions, aspiration, risk of cardiac, pulmonary, neurologic, locomotor, anesthetic events, incomplete colonoscopy, and other unforeseen complications including death    Stop Plavix 6/25/2025  "

## 2025-03-24 NOTE — PATIENT INSTRUCTIONS
We will schedule you for your colonoscopy on 6/30/2025.  My office will provide you with preprocedure instructions.  I want you to stop your Plavix on 6/25/2025

## 2025-03-25 ENCOUNTER — APPOINTMENT (OUTPATIENT)
Dept: NEPHROLOGY | Facility: CLINIC | Age: 75
End: 2025-03-25
Payer: MEDICARE

## 2025-03-25 ENCOUNTER — PHARMACY VISIT (OUTPATIENT)
Dept: PHARMACY | Facility: CLINIC | Age: 75
End: 2025-03-25
Payer: COMMERCIAL

## 2025-03-25 DIAGNOSIS — I25.810 CORONARY ARTERY DISEASE INVOLVING CORONARY BYPASS GRAFT OF NATIVE HEART, UNSPECIFIED WHETHER ANGINA PRESENT: ICD-10-CM

## 2025-03-25 PROCEDURE — RXMED WILLOW AMBULATORY MEDICATION CHARGE

## 2025-03-25 RX ORDER — METOPROLOL SUCCINATE 200 MG/1
200 TABLET, EXTENDED RELEASE ORAL DAILY
Qty: 90 TABLET | Refills: 0 | Status: SHIPPED | OUTPATIENT
Start: 2025-03-25

## 2025-03-31 DIAGNOSIS — Z79.4 TYPE 2 DIABETES MELLITUS WITH OTHER SPECIFIED COMPLICATION, WITH LONG-TERM CURRENT USE OF INSULIN: ICD-10-CM

## 2025-03-31 DIAGNOSIS — E11.69 TYPE 2 DIABETES MELLITUS WITH OTHER SPECIFIED COMPLICATION, WITH LONG-TERM CURRENT USE OF INSULIN: ICD-10-CM

## 2025-03-31 RX ORDER — INSULIN LISPRO 100 [IU]/ML
INJECTION, SOLUTION INTRAVENOUS; SUBCUTANEOUS
Qty: 10 ML | Refills: 2 | Status: SHIPPED | OUTPATIENT
Start: 2025-03-31

## 2025-04-01 ENCOUNTER — APPOINTMENT (OUTPATIENT)
Dept: NEPHROLOGY | Facility: CLINIC | Age: 75
End: 2025-04-01
Payer: MEDICARE

## 2025-04-01 VITALS
WEIGHT: 197 LBS | HEIGHT: 68 IN | OXYGEN SATURATION: 95 % | HEART RATE: 65 BPM | BODY MASS INDEX: 29.86 KG/M2 | TEMPERATURE: 97.5 F | DIASTOLIC BLOOD PRESSURE: 73 MMHG | SYSTOLIC BLOOD PRESSURE: 116 MMHG

## 2025-04-01 DIAGNOSIS — N18.31 STAGE 3A CHRONIC KIDNEY DISEASE (MULTI): Primary | ICD-10-CM

## 2025-04-01 DIAGNOSIS — I10 ESSENTIAL HYPERTENSION: ICD-10-CM

## 2025-04-01 DIAGNOSIS — M10.9 GOUT, ARTHRITIS: ICD-10-CM

## 2025-04-01 DIAGNOSIS — N06.0 ISOLATED PROTEINURIA WITH MINOR GLOMERULAR ABNORMALITY: ICD-10-CM

## 2025-04-01 DIAGNOSIS — E11.00 TYPE 2 DIABETES MELLITUS WITH HYPEROSMOLARITY WITHOUT COMA, WITHOUT LONG-TERM CURRENT USE OF INSULIN (MULTI): ICD-10-CM

## 2025-04-01 PROCEDURE — 99214 OFFICE O/P EST MOD 30 MIN: CPT | Performed by: INTERNAL MEDICINE

## 2025-04-01 PROCEDURE — 3074F SYST BP LT 130 MM HG: CPT | Performed by: INTERNAL MEDICINE

## 2025-04-01 PROCEDURE — 1159F MED LIST DOCD IN RCRD: CPT | Performed by: INTERNAL MEDICINE

## 2025-04-01 PROCEDURE — 3078F DIAST BP <80 MM HG: CPT | Performed by: INTERNAL MEDICINE

## 2025-04-01 PROCEDURE — 1157F ADVNC CARE PLAN IN RCRD: CPT | Performed by: INTERNAL MEDICINE

## 2025-04-01 PROCEDURE — 1123F ACP DISCUSS/DSCN MKR DOCD: CPT | Performed by: INTERNAL MEDICINE

## 2025-04-01 PROCEDURE — G2211 COMPLEX E/M VISIT ADD ON: HCPCS | Performed by: INTERNAL MEDICINE

## 2025-04-01 NOTE — PROGRESS NOTES
"Subjective       Patel GARCIA Goncalves \"Tatyana\" is a 75 y.o. male who has past medical history of coronary artery disease status post CABG, hypertension, type 2 diabetes-well-controlled, anemia, gout who is coming to see me today for [id number] management    Last office visit March 2024 no kidney related complaints or concerns.  Blood pressure is accepted.  Repeat blood work showed stable serum creatinine 1.4 and GFR 49 consistent with baseline.  No leg swelling or shortness of breath.  Adherent to medications.  Try to watch salt intake.  No significant hypervolemia in exam today    Prior notes    Last office visit December 2023.  Tatyana came alone today.  No kidney disease complaints or concerns.  He is not quite sure about his medication list.  Blood pressure is running high-he follows with cardiology.  He does not check blood pressure at home.  No leg swelling or shortness of breath.  No recent blood work since December 2023-repeat today      Prior notes  Tatyana came alone today.  He is not aware of history of chronic kidney disease.  He had CABG done in 2022 with good results.  No leg swelling or shortness of breath.  He enjoys motorcycling.  No chest pain.  He recently started switching to healthier diet.  He is non-smoker.  No significant NSAID use at home.  No lower urine tract symptoms.  No foam or bubbles in the urine.  He could not leave urine sample today      Objective   /73 (BP Location: Left arm, Patient Position: Standing, BP Cuff Size: Adult)   Pulse 65   Temp 36.4 °C (97.5 °F)   Ht 1.727 m (5' 8\")   Wt 89.4 kg (197 lb)   SpO2 95%   BMI 29.95 kg/m²   Wt Readings from Last 3 Encounters:   04/01/25 89.4 kg (197 lb)   03/24/25 89.8 kg (198 lb)   03/05/25 90.3 kg (199 lb)       Physical Exam    General appearance: no distress awake and alert on room air, euvolemic on exam  Eyes: non-icteric  HEENT: atrumatic head, PEERLA, moist mucosa  Skin: no apparent rash  Heart: NSR, S1, S2 normal, no murmur or " "gallop  Lungs: Symmetrical expansion,CTA bilat no wheezing/crackles  Abdomen: soft, nt/nd, obese  Extremities: no edema bilat  Neuro: No FND,asterixis, no focal deficits noticed        Review of Systems     Constitutional: no fever, no chills, no recent weight gain and no recent weight loss.   Eyes: no blurred vision and no diplopia.   ENT: no hearing loss, no earache, no sore throat, no swollen glands in the neck and no nasal discharge.   Cardiovascular: no chest pain, no palpitations and no lower extremity edema.   Respiratory: no shortness of breath, no chronic cough and no shortness of breath during exertion.   Gastrointestinal: no abdominal pain, no constipation, no heartburn, no vomiting, no bloody stools and no change in bowel movements.   Genitourinary: no dysuria and no hematuria.   Musculoskeletal: no arthralgias and no myalgias.   Skin: no rashes and no skin lesions.   Neurological: no headaches and no dizziness.   Psychiatric: no confusion, no depression and no anxiety.   Endocrine: no heat intolerance, no cold intolerance, appetite not increased, no thyroid disorder, no increased urinary frequency and no dry skin.   Hematologic/Lymphatic: does not bleed easily and does not bruise easily.   All other systems have been reviewed and are negative for complaint.         Data Review                   Lab Results   Component Value Date    URICACID 7.9 (H) 03/21/2024           Lab Results   Component Value Date    HGBA1C 8.4 (A) 03/05/2025                 No lab exists for component: \"CR\", \"PHOSPHORUS\"        Albumin/Creatinine Ratio   Date Value Ref Range Status   03/21/2024 446.6 (H) <30.0 ug/mg Creat Final   12/14/2023 335.5 (H) <30.0 ug/mg Creat Final     Albumin/Creatine Ratio   Date Value Ref Range Status   10/12/2022 243.7 (H) 0.0 - 30.0 ug/mg crt Final            RFP  Recent Labs     01/30/25  1114 11/11/24  1132 03/21/24  1501 12/14/23  1523 08/14/23  1004 08/04/23  1153 08/04/23  0637 08/03/23  2151 " 08/03/23  1549 08/03/23  1405 08/02/23  1449    138 139 143 138 135* 140 136 138   < > 135*   K 4.8 4.4 4.0 3.7 4.6 5.5* 6.0* 5.7* 6.1*   < > 6.3*    103 99 104 105 108* 111* 110* 109*   < > 109*   CO2 27 28 30 28 25 20* 22 18* 18*   < > 17*   BUN 28* 29* 24* 18 33* 48* 52* 60* 66*   < > 72*   CREATININE 1.49* 1.59* 1.33* 1.35* 1.74* 1.42* 1.51* 1.58* 1.75*   < > 2.14*   GLUCOSE 229* 296* 194* 51* 135* 325* 148* 259* 171*   < > 190*   CALCIUM 9.9 9.5 9.9 9.7 9.7 9.7 9.6 9.7 10.6*   < > 10.7*   PHOS  --   --  3.6 3.0  --  4.2 4.9 4.5 3.8  --  4.9   EGFR 49* 45* 56* 55*  --   --   --   --   --   --   --    ANIONGAP 12 11 14 15 13 13 13 14 17   < > 15    < > = values in this interval not displayed.        Urineanalysis  Recent Labs     12/14/23  1523 08/03/23  1602 10/19/22  1426 10/12/22  1410   COLORU Straw YELLOW YELLOW YELLOW   APPEARANCEU Clear HAZY CLEAR CLEAR   SPECGRAVU 1.006 1.011 1.011 1.015   CLARISSA 6.0 5.0 5.0 5.0   PROTUR NEGATIVE NEGATIVE NEGATIVE 30(1+)*   GLUCOSEU NEGATIVE 150(2+)* >=500(3+)* >=500(3+)*   BLOODU NEGATIVE NEGATIVE NEGATIVE SMALL(1+)*   KETONESU NEGATIVE NEGATIVE NEGATIVE NEGATIVE   BILIRUBINU NEGATIVE NEGATIVE NEGATIVE NEGATIVE   NITRITEU NEGATIVE NEGATIVE NEGATIVE NEGATIVE   LEUKOCYTESU NEGATIVE NEGATIVE NEGATIVE NEGATIVE       Urine Electrolytes  Recent Labs     03/21/24  1508 12/14/23  1523 08/03/23  1602 10/19/22  1426 10/12/22  1410   CREATU 28.1 29.3  --   --  73.4   PROTUR  --  NEGATIVE NEGATIVE NEGATIVE 30(1+)*   ALBUMINUR 125.5 98.3  --   --   --    MICROALBCREA 446.6* 335.5*  --   --  243.7*        Urine Micro  Recent Labs     10/12/22  1410   WBCU 5   RBCU 1   SQUAMEPIU 1   MUCUSU FEW        Iron  Recent Labs     12/14/23  1523 07/18/23  1229 11/30/22  1018   IRON 86 92 63   TIBC 320 298 329   IRONSAT 27 31 19*   FERRITIN 100 124 178            Current Outpatient Medications:     amLODIPine (Norvasc) 10 mg tablet, Take 1 tablet (10 mg) by mouth once daily., Disp: 90  "tablet, Rfl: 3    aspirin 81 mg chewable tablet, Chew once daily., Disp: , Rfl:     atorvastatin (Lipitor) 80 mg tablet, TAKE 1 TABLET BY MOUTH ONCE DAILY AT BEDTIME, Disp: 90 tablet, Rfl: 0    clopidogrel (Plavix) 75 mg tablet, Take 1 tablet by mouth once daily, Disp: 90 tablet, Rfl: 0    dapagliflozin propanediol (Farxiga) 10 mg, Take 1 tablet (10 mg) by mouth once daily., Disp: 30 tablet, Rfl: 11    flash glucose sensor kit (FreeStyle Evy 2 Sensor) kit, USE AS DIRECTED -CHANGE  SENSOR  EVERY  14  DAYS, Disp: 6 each, Rfl: 0    furosemide (Lasix) 40 mg tablet, Take 1 tablet (40 mg) by mouth once daily., Disp: 90 tablet, Rfl: 3    insulin lispro (HumaLOG) 100 unit/mL pen, INJECT 8 UNITS SUBCUTANEOUSLY 3 TIMES DAILY PLUS SLIDING SCALE. 151-200, 2 UNITS. 201-250, 4 UNITS. 251-300, 6 UNITS. 301-350, 8 UNITS. 351-400, 10 UNITS. MAX MEAL DOSE 18 UNITS. MAX DAILY 54 UNITS, Disp: 10 mL, Rfl: 2    Lantus Solostar U-100 Insulin 100 unit/mL (3 mL) pen, INJECT 35 UNITS SUBCUTANEOUSLY AT BEDTIME, Disp: 30 mL, Rfl: 0    lisinopril 40 mg tablet, Take 1 tablet (40 mg) by mouth once daily. for blood pressure, Disp: , Rfl:     metoprolol succinate XL (Toprol-XL) 200 mg 24 hr tablet, Take 1 tablet by mouth once daily, Disp: 90 tablet, Rfl: 0    multivitamin tablet, Take 1 tablet by mouth once daily., Disp: , Rfl:     peg 3350-sod sulf,chlr-pot-mag (Suflave) 178.7-7.3-0.5 gram recon soln, Take 2 bottles by mouth if needed (Kit prep take one bottle at 6pm night before procedure and take second bottle at 4 am day of procedure)., Disp: 2 each, Rfl: 0    pen needle, diabetic 31 gauge x 5/16\" needle, USE 1 NEW PEN NEEDLE 4 TIMES DAILY, Disp: 400 each, Rfl: 0    spironolactone (Aldactone) 25 mg tablet, Take 1 tablet (25 mg) by mouth once daily., Disp: 90 tablet, Rfl: 3    tadalafil (Cialis) 5 mg tablet, Take 1 tablet (5 mg) by mouth once daily. Take one tablet and if not effective, ok to increase to 10mg prn, Disp: 30 tablet, Rfl: " 0      Assessment and Plan     Patel (Jeet JOSE Goncalves is a 75y.o. male who has past medical history of coronary artery disease status post CABG, hypertension, type 2 diabetes-well-controlled, anemia, gout who is coming to see me today for CKD follow-up    Impression    # Chronic kidney disease -G3 B/A1-kidney function was all normal up until July 2023.  Baseline serum creatinine since then is stable 1.5-2 GFR 40-50 mill per minute troponin 23 m².  Prior spot ACR is 300-400 mg/g.  CKD is most likely atherosclerotic cardiovascular disease/diabetic nephropathy.  Currently is on RAAS inhibitors (lisinopril 40 mg, spironolactone 25 mg)-we will continue.  Within normal electrolytes and potassium.  No kidney image to review-will defer at this time      # BP -is accepted.  He does not check at home.  Current medication Lasix 40 mg, metoprolol 200 mg daily, lisinopril 40 mg, amlodipine 10 mg, spironolactone 25 mg.  No changes        #Anemia Hb 11-11.5.  Will check iron storage      #(CKD)-MBD  -Will check calcium, phosphorus, PTH and vitamin D    # CVS/coronary artery disease status post CABG/CHF-echo 40-45% EF.  Currently on RAAS inhibitor and SGL 2 inhibitor-Farxiga 10 mg  -Continue atorvastatin 80 mg, baby aspirin and Plavix    #Diabetes-good control.  A1c 6.9 on insulin, Farxiga    # Hyperuricemia uric acid 8.1.  We will recheck.  Low threshold to start uric acid lowering therapy.  No history of gout      #Others  - No NSAIDs, no contrast as possible. If to be done- we recommend holding ACEi/ARBS/diuretics 24 hrs prior to contrast exposure and ensure appropriate hydration   - Ensure well hydration  - Limit salt in diet  - No smoking    Patient received CKD education and counselling    Follow-up in 12 months with another repeat blood work and urinalysis prior to visit      Danika Baer MD, MS, [facility]   Clinical  - [facility] [employer]   Nephrologist - [facility]  [facility]

## 2025-04-01 NOTE — PATIENT INSTRUCTIONS
Was nice meeting you nephrology clinic today-discussed the following    # Chronic kidney disease stage III around 40-50 %.  Possible atherosclerotic cardiovascular disease.      # Hypertension-blood pressures elevated.  Continue Lasix 40 mg/metoprolol 200 mg/lisinopril 40 mg/amlodipine 10 mg/spironolactone 25 mg    # Coronary artery disease status post CABG-continue follows cardiology    Repeat urinalysis today check for protein leak    Follow-up in 12 months with another repeat blood work and urinalysis prior to visit    Danika Baer MD, MS, [facility]   Clinical  - [facility] [employer]   Nephrologist - [facility] - [facility]

## 2025-04-02 DIAGNOSIS — I25.810 CORONARY ARTERY DISEASE INVOLVING CORONARY BYPASS GRAFT OF NATIVE HEART, UNSPECIFIED WHETHER ANGINA PRESENT: ICD-10-CM

## 2025-04-02 LAB
ALBUMIN/CREAT UR: 52 MG/G CREAT
CREAT UR-MCNC: 65 MG/DL (ref 20–320)
MICROALBUMIN UR-MCNC: 3.4 MG/DL

## 2025-04-02 RX ORDER — ATORVASTATIN CALCIUM 80 MG/1
80 TABLET, FILM COATED ORAL NIGHTLY
Qty: 90 TABLET | Refills: 0 | Status: SHIPPED | OUTPATIENT
Start: 2025-04-02

## 2025-04-17 DIAGNOSIS — E11.69 TYPE 2 DIABETES MELLITUS WITH OTHER SPECIFIED COMPLICATION, UNSPECIFIED WHETHER LONG TERM INSULIN USE (MULTI): ICD-10-CM

## 2025-04-17 RX ORDER — FLASH GLUCOSE SENSOR
KIT MISCELLANEOUS
Qty: 6 EACH | Refills: 0 | Status: SHIPPED | OUTPATIENT
Start: 2025-04-17

## 2025-04-20 DIAGNOSIS — E11.9 TYPE 2 DIABETES MELLITUS WITHOUT COMPLICATION, WITH LONG-TERM CURRENT USE OF INSULIN: Primary | ICD-10-CM

## 2025-04-20 DIAGNOSIS — Z79.4 TYPE 2 DIABETES MELLITUS WITHOUT COMPLICATION, WITH LONG-TERM CURRENT USE OF INSULIN: Primary | ICD-10-CM

## 2025-04-22 RX ORDER — INSULIN GLARGINE-YFGN 100 [IU]/ML
INJECTION, SOLUTION SUBCUTANEOUS
Qty: 30 ML | Refills: 0 | Status: SHIPPED | OUTPATIENT
Start: 2025-04-22

## 2025-05-01 DIAGNOSIS — Z79.4 TYPE 2 DIABETES MELLITUS WITHOUT COMPLICATION, WITH LONG-TERM CURRENT USE OF INSULIN: ICD-10-CM

## 2025-05-01 DIAGNOSIS — E11.9 TYPE 2 DIABETES MELLITUS WITHOUT COMPLICATION, WITH LONG-TERM CURRENT USE OF INSULIN: ICD-10-CM

## 2025-05-02 RX ORDER — PEN NEEDLE, DIABETIC 30 GX3/16"
NEEDLE, DISPOSABLE MISCELLANEOUS
Qty: 400 EACH | Refills: 0 | Status: SHIPPED | OUTPATIENT
Start: 2025-05-02

## 2025-05-07 DIAGNOSIS — Z79.4 TYPE 2 DIABETES MELLITUS WITHOUT COMPLICATION, WITH LONG-TERM CURRENT USE OF INSULIN: ICD-10-CM

## 2025-05-07 DIAGNOSIS — E11.9 TYPE 2 DIABETES MELLITUS WITHOUT COMPLICATION, WITH LONG-TERM CURRENT USE OF INSULIN: ICD-10-CM

## 2025-05-07 RX ORDER — DAPAGLIFLOZIN 10 MG/1
10 TABLET, FILM COATED ORAL DAILY
Qty: 90 TABLET | Refills: 0 | Status: SHIPPED | OUTPATIENT
Start: 2025-05-07

## 2025-05-12 ASSESSMENT — ENCOUNTER SYMPTOMS: NECK PAIN: 1

## 2025-05-12 NOTE — PROGRESS NOTES
"Patient ID:   Patel Goncalves \"Mckenna" is a 75 y.o. male with PMH remarkable for CAD s/p CABG x4 on 11/28/2022 with Dr Ewelina Trujillo, DM2, Gout, HTN, CKD who presents to the office today for Neck Pain (Left side of neck into shoulder, started a couple months ago).    Last Office Visit: 3/5/2025 for annual medicare wellness visit. A1c was 8.4. Colon cancer screening was ordered.     Reports that he stopped cialis d/t it not working correctly. He will become semi erect and he said that the head of penis will go inside instead of outside. The tip of penis will become puffy and causing discomfort.     No numbness or weakness in hands. Appears to be MSK in nature. Few years ago, fell and pushed head to right.    Neck Pain   This is a chronic problem. The current episode started more than 1 month ago. The problem occurs constantly. The problem has been waxing and waning. The pain is present in the left side. The quality of the pain is described as burning and aching. The pain is at a severity of 3/10. The pain is mild. The symptoms are aggravated by position. He has tried acetaminophen for the symptoms. The treatment provided no relief.     Social History     Tobacco Use    Smoking status: Never    Smokeless tobacco: Never   Substance Use Topics    Alcohol use: Yes     Comment: Occasional      Review of Systems   Constitutional: Negative.    HENT: Negative.     Eyes: Negative.    Respiratory: Negative.     Cardiovascular: Negative.    Gastrointestinal: Negative.    Endocrine: Negative.    Genitourinary: Negative.    Musculoskeletal:  Positive for neck pain.   Skin: Negative.    Neurological: Negative.    Psychiatric/Behavioral: Negative.     All other systems reviewed and are negative.    Visit Vitals  /78 (BP Location: Left arm, Patient Position: Sitting)   Pulse 60   Resp 18   Ht 1.727 m (5' 8\")   Wt 87.5 kg (193 lb)   SpO2 95%   BMI 29.35 kg/m²   Smoking Status Never   BSA 2.05 m²     Allergies[1]     Physical " Exam  Vitals reviewed. Exam conducted with a chaperone present.   Constitutional:       Appearance: Normal appearance. He is well-developed.   HENT:      Head: Normocephalic.      Right Ear: External ear normal.      Left Ear: External ear normal.      Nose: Nose normal.      Mouth/Throat:      Lips: Pink.      Mouth: Mucous membranes are moist.   Eyes:      General: Lids are normal.      Pupils: Pupils are equal, round, and reactive to light.   Neck:      Trachea: Trachea normal.   Cardiovascular:      Rate and Rhythm: Normal rate and regular rhythm.      Heart sounds: Normal heart sounds.   Pulmonary:      Effort: Pulmonary effort is normal.      Breath sounds: Normal breath sounds.   Abdominal:      General: Bowel sounds are normal.      Palpations: Abdomen is soft.   Musculoskeletal:      Cervical back: Tenderness present.   Skin:     General: Skin is warm and moist.   Neurological:      General: No focal deficit present.      Mental Status: He is alert and oriented to person, place, and time. Mental status is at baseline.   Psychiatric:         Attention and Perception: Attention normal.         Mood and Affect: Mood normal.         Speech: Speech normal.         Behavior: Behavior is cooperative.         Thought Content: Thought content normal.         Cognition and Memory: Cognition normal.         Judgment: Judgment normal.       Current Outpatient Medications   Medication Instructions    amLODIPine (NORVASC) 10 mg, oral, Daily    aspirin 81 mg chewable tablet Daily    atorvastatin (LIPITOR) 80 mg, oral, Nightly    clopidogrel (PLAVIX) 75 mg, oral, Daily    Farxiga 10 mg, oral, Daily    FreeStyle Evy 2 Sensor kit USE AS DIRECTED -CHANGE  SENSOR  EVERY  14  DAYS    furosemide (LASIX) 40 mg, oral, Daily    insulin lispro (HumaLOG) 100 unit/mL pen INJECT 8 UNITS SUBCUTANEOUSLY 3 TIMES DAILY PLUS SLIDING SCALE. 151-200, 2 UNITS. 201-250, 4 UNITS. 251-300, 6 UNITS. 301-350, 8 UNITS. 351-400, 10 UNITS. MAX MEAL  "DOSE 18 UNITS. MAX DAILY 54 UNITS    Lantus Solostar U-100 Insulin 100 unit/mL (3 mL) pen INJECT 35 UNITS SUBCUTANEOUSLY AT BEDTIME    lisinopril 40 mg, Daily    metoprolol succinate XL (TOPROL-XL) 200 mg, oral, Daily    multivitamin tablet 1 tablet, Daily    pen needle, diabetic 31 gauge x 5/16\" needle USE 1 NEW PEN NEEDLE 4 TIMES DAILY    Semglee,insulin glarg-yfgn,Pen 100 unit/mL (3 mL) pen INJECT 35 UNITS SUBCUTANEOUSLY AT BEDTIME    spironolactone (ALDACTONE) 25 mg, oral, Daily      Lab Results   Component Value Date    WBC 7.2 01/30/2025    HGB 17.2 01/30/2025    HCT 51.4 01/30/2025     01/30/2025    CHOL 86 07/18/2023    TRIG 155 (H) 07/18/2023    HDL 36.2 (A) 07/18/2023    ALT 32 08/14/2023    AST 24 08/14/2023     01/30/2025    K 4.8 01/30/2025     01/30/2025    CREATININE 1.49 (H) 01/30/2025    BUN 28 (H) 01/30/2025    CO2 27 01/30/2025    TSH 1.81 10/20/2022    INR 1.2 (H) 10/18/2022    HGBA1C 8.4 (A) 03/05/2025       Assessment/Plan   Problem List Items Addressed This Visit           ICD-10-CM    Erectile dysfunction N52.9    - discontinue Cialis  - will refer to urology to eval         Relevant Orders    Referral to Urology    Musculoskeletal neck pain M54.2    - start on meloxicam 15mg daily x7d  - start on prednisone 40mg daily x7d  - start on robaxin 750mg nightly x10d          Relevant Medications    predniSONE (Deltasone) 20 mg tablet    meloxicam (Mobic) 15 mg tablet    methocarbamol (Robaxin) 750 mg tablet     ------  Written by Melody De Leon RN, acting as a scribe for Dr. Hidalgo. This note accurately reflects the work and decisions made by Dr. Hidalgo.     I, Dr. Hidalgo, attest all medical record entries made by the scribe were under my direction and were personally dictated by me. I have reviewed the chart and agree that the record accurately reflects my performance of the history, physical exam, and assessment and plan.          [1]   Allergies  Allergen Reactions    Bee Venom " Protein (Honey Bee) Hives and Itching     Hives from Hornets    Naproxen Sodium Itching

## 2025-05-13 ENCOUNTER — OFFICE VISIT (OUTPATIENT)
Dept: PRIMARY CARE | Facility: CLINIC | Age: 75
End: 2025-05-13
Payer: MEDICARE

## 2025-05-13 VITALS
WEIGHT: 193 LBS | BODY MASS INDEX: 29.25 KG/M2 | HEIGHT: 68 IN | HEART RATE: 60 BPM | OXYGEN SATURATION: 95 % | SYSTOLIC BLOOD PRESSURE: 125 MMHG | DIASTOLIC BLOOD PRESSURE: 78 MMHG | RESPIRATION RATE: 18 BRPM

## 2025-05-13 DIAGNOSIS — N52.9 ERECTILE DYSFUNCTION, UNSPECIFIED ERECTILE DYSFUNCTION TYPE: ICD-10-CM

## 2025-05-13 DIAGNOSIS — M54.2 MUSCULOSKELETAL NECK PAIN: ICD-10-CM

## 2025-05-13 PROCEDURE — G2211 COMPLEX E/M VISIT ADD ON: HCPCS | Performed by: INTERNAL MEDICINE

## 2025-05-13 PROCEDURE — 99214 OFFICE O/P EST MOD 30 MIN: CPT | Performed by: INTERNAL MEDICINE

## 2025-05-13 PROCEDURE — 3074F SYST BP LT 130 MM HG: CPT | Performed by: INTERNAL MEDICINE

## 2025-05-13 PROCEDURE — 1160F RVW MEDS BY RX/DR IN RCRD: CPT | Performed by: INTERNAL MEDICINE

## 2025-05-13 PROCEDURE — 1036F TOBACCO NON-USER: CPT | Performed by: INTERNAL MEDICINE

## 2025-05-13 PROCEDURE — 1159F MED LIST DOCD IN RCRD: CPT | Performed by: INTERNAL MEDICINE

## 2025-05-13 PROCEDURE — 1125F AMNT PAIN NOTED PAIN PRSNT: CPT | Performed by: INTERNAL MEDICINE

## 2025-05-13 PROCEDURE — 3078F DIAST BP <80 MM HG: CPT | Performed by: INTERNAL MEDICINE

## 2025-05-13 RX ORDER — METHOCARBAMOL 750 MG/1
750 TABLET, FILM COATED ORAL NIGHTLY
Qty: 10 TABLET | Refills: 0 | Status: SHIPPED | OUTPATIENT
Start: 2025-05-13 | End: 2025-05-23

## 2025-05-13 RX ORDER — MELOXICAM 15 MG/1
15 TABLET ORAL DAILY
Qty: 7 TABLET | Refills: 0 | Status: SHIPPED | OUTPATIENT
Start: 2025-05-13 | End: 2025-05-20

## 2025-05-13 RX ORDER — PREDNISONE 20 MG/1
40 TABLET ORAL DAILY
Qty: 10 TABLET | Refills: 0 | Status: SHIPPED | OUTPATIENT
Start: 2025-05-13 | End: 2025-05-18

## 2025-05-13 ASSESSMENT — ENCOUNTER SYMPTOMS
GASTROINTESTINAL NEGATIVE: 1
CARDIOVASCULAR NEGATIVE: 1
RESPIRATORY NEGATIVE: 1
CONSTITUTIONAL NEGATIVE: 1
NEUROLOGICAL NEGATIVE: 1
EYES NEGATIVE: 1
ENDOCRINE NEGATIVE: 1
PSYCHIATRIC NEGATIVE: 1

## 2025-05-13 ASSESSMENT — PATIENT HEALTH QUESTIONNAIRE - PHQ9
1. LITTLE INTEREST OR PLEASURE IN DOING THINGS: NOT AT ALL
2. FEELING DOWN, DEPRESSED OR HOPELESS: NOT AT ALL
SUM OF ALL RESPONSES TO PHQ9 QUESTIONS 1 AND 2: 0

## 2025-05-13 ASSESSMENT — PAIN SCALES - GENERAL: PAINLEVEL_OUTOF10: 10-WORST PAIN EVER

## 2025-05-13 NOTE — ASSESSMENT & PLAN NOTE
- start on meloxicam 15mg daily x7d  - start on prednisone 40mg daily x7d  - start on robaxin 750mg nightly x10d

## 2025-05-15 DIAGNOSIS — N52.9 ERECTILE DYSFUNCTION, UNSPECIFIED ERECTILE DYSFUNCTION TYPE: ICD-10-CM

## 2025-05-19 RX ORDER — TADALAFIL 10 MG/1
10 TABLET ORAL DAILY
Qty: 30 TABLET | Refills: 1 | Status: SHIPPED | OUTPATIENT
Start: 2025-05-19

## 2025-06-05 DIAGNOSIS — I25.810 CORONARY ARTERY DISEASE INVOLVING CORONARY BYPASS GRAFT OF NATIVE HEART, UNSPECIFIED WHETHER ANGINA PRESENT: ICD-10-CM

## 2025-06-06 ENCOUNTER — ANESTHESIA EVENT (OUTPATIENT)
Dept: GASTROENTEROLOGY | Facility: HOSPITAL | Age: 75
End: 2025-06-06
Payer: MEDICARE

## 2025-06-06 RX ORDER — CLOPIDOGREL BISULFATE 75 MG/1
75 TABLET ORAL DAILY
Qty: 90 TABLET | Refills: 0 | Status: SHIPPED | OUTPATIENT
Start: 2025-06-06

## 2025-06-06 NOTE — ANESTHESIA PREPROCEDURE EVALUATION
"Patient: Patel Goncalves \"Tatyana\"    Procedure Information       Date/Time: 06/30/25 0900    Scheduled providers: Cody Tang MD    Procedure: COLONOSCOPY    Location: [facility]            Relevant Problems   Anesthesia (within normal limits)      Cardiac   (+) Atrial fibrillation (Multi)   (+) 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 pectoris   (+) Other hyperlipidemia   (+) Uncontrolled hypertension      Pulmonary (within normal limits)      GI (within normal limits)      /Renal (within normal limits)      Liver (within normal limits)      Endocrine   (+) Diabetes mellitus, type II (Multi)      Hematology   (+) Anemia   (+) Iron deficiency anemia      Musculoskeletal   (+) Articular gout   (+) Gout, arthritis      HEENT (within normal limits)      ID (within normal limits)      Skin (within normal limits)      GYN (within normal limits)      Circulatory   (+) S/P CABG (coronary artery bypass graft)      Genitourinary   (+) CKD (chronic kidney disease)   (+) Stage 3b chronic kidney disease (Multi)     There were no vitals filed for this visit.    Surgical History[1]  Medical History[2]  Current Medications[3]  Prior to Admission medications    Medication Sig Start Date End Date Taking? Authorizing Provider   amLODIPine (Norvasc) 10 mg tablet Take 1 tablet (10 mg) by mouth once daily. 6/28/24 6/28/25  Jose Antonio Maynard MD   aspirin 81 mg chewable tablet Chew once daily. 10/18/22   Historical Provider, MD   atorvastatin (Lipitor) 80 mg tablet TAKE 1 TABLET BY MOUTH ONCE DAILY AT BEDTIME 4/2/25   Mike Moran MD   clopidogrel (Plavix) 75 mg tablet Take 1 tablet by mouth once daily 6/6/25   Mike Moran MD   Farxiga 10 mg tablet Take 1 tablet by mouth once daily 5/7/25   Mike Moran MD   FreeStyle Evy 2 Sensor kit USE AS DIRECTED -CHANGE  SENSOR  EVERY  14  DAYS 4/17/25   Mike Moran MD   furosemide (Lasix) 40 mg tablet Take 1 tablet (40 " "mg) by mouth once daily. 8/16/24 8/16/25  Jose Antonio Maynard MD   insulin lispro (HumaLOG) 100 unit/mL pen INJECT 8 UNITS SUBCUTANEOUSLY 3 TIMES DAILY PLUS SLIDING SCALE. 151-200, 2 UNITS. 201-250, 4 UNITS. 251-300, 6 UNITS. 301-350, 8 UNITS. 351-400, 10 UNITS. MAX MEAL DOSE 18 UNITS. MAX DAILY 54 UNITS 3/31/25   Mike Moran MD   Lantus Solostar U-100 Insulin 100 unit/mL (3 mL) pen INJECT 35 UNITS SUBCUTANEOUSLY AT BEDTIME 1/30/25   Mike Moran MD   lisinopril 40 mg tablet Take 1 tablet (40 mg) by mouth once daily. for blood pressure 11/15/23   Historical Provider, MD   methocarbamol (Robaxin) 750 mg tablet Take 1 tablet (750 mg) by mouth once daily at bedtime for 10 days. 5/13/25 5/23/25  Mike Moran MD   metoprolol succinate XL (Toprol-XL) 200 mg 24 hr tablet Take 1 tablet by mouth once daily 3/25/25   Mike Moran MD   multivitamin tablet Take 1 tablet by mouth once daily.    Historical Provider, MD   pen needle, diabetic 31 gauge x 5/16\" needle USE 1 NEW PEN NEEDLE 4 TIMES DAILY 5/2/25   Mike Moran MD   Semglee,insulin glarg-yfgn,Pen 100 unit/mL (3 mL) pen INJECT 35 UNITS SUBCUTANEOUSLY AT BEDTIME 4/22/25   Mike Moran MD   spironolactone (Aldactone) 25 mg tablet Take 1 tablet (25 mg) by mouth once daily. 8/16/24 8/16/25  Jose Antonio Maynard MD   tadalafil (Cialis) 10 mg tablet Take 1 tablet (10 mg) by mouth once daily. 5/19/25   Mike Moran MD   clopidogrel (Plavix) 75 mg tablet Take 1 tablet by mouth once daily 3/11/25 6/6/25  Mike Moran MD     RX Allergies[4]  Social History     Tobacco Use    Smoking status: Never    Smokeless tobacco: Never   Substance Use Topics    Alcohol use: Yes     Comment: Occasional         Chemistry    Lab Results   Component Value Date/Time     01/30/2025 1114    K 4.8 01/30/2025 1114     01/30/2025 1114    CO2 27 01/30/2025 1114    BUN 28 (H) 01/30/2025 1114    CREATININE 1.49 (H) 01/30/2025 1114    Lab Results   Component Value " Date/Time    CALCIUM 9.9 01/30/2025 1114    ALKPHOS 58 08/14/2023 1004    AST 24 08/14/2023 1004    ALT 32 08/14/2023 1004    BILITOT 0.4 08/14/2023 1004          Lab Results   Component Value Date/Time    WBC 7.2 01/30/2025 1114    HGB 17.2 01/30/2025 1114    HCT 51.4 01/30/2025 1114     01/30/2025 1114     Lab Results   Component Value Date/Time    PROTIME 14.4 (H) 10/18/2022 0453    INR 1.2 (H) 10/18/2022 0453     Encounter Date: 01/27/25   ECG 12 lead (Clinic Performed)   Result Value    Ventricular Rate 64    Atrial Rate 64    UT Interval 192    QRS Duration 156    QT Interval 456    QTC Calculation(Bazett) 470    P Axis 58    R Axis -20    T Axis 121    QRS Count 11    Q Onset 213    P Onset 117    P Offset 145    T Offset 441    QTC Fredericia 465    Narrative    Sinus rhythm with occasional Premature ventricular complexes  Left bundle branch block  Abnormal ECG  When compared with ECG of 03-AUG-2023 14:14,  Previous ECG has undetermined rhythm, needs review  Questionable change in QRS axis     No results found for this or any previous visit from the past 1095 days.    Clinical information reviewed:                 Chart reviewed.  Cardiac clearance requested.    Echo 3/7/23-  CONCLUSIONS:  1. Left ventricular systolic function is mildly to moderately decreased with a 40-45% estimated ejection fraction.  2. Abnormal septal motion consistent with post-operative status.  3. Spectral Doppler shows an abnormal pattern of left ventricular diastolic filling.  4. There is low normal right ventricular systolic function.  5. Mild aortic valve stenosis.  6. There is mild mitral and tricuspid regurgitation.  7. The estimated pulmonary artery pressure is mildly elevated with the RVSP at 36.4 mmHg.    Cardiac stress test 1/27/23-  Summary:  1. No clinical evidence for ischemia at a submaximal workload.  2. The submaximal level of stress was achieved.  3. This exercise test is indeterminate because of an abnormal  baseline ECG: LBBB .  4. OK for CV rehab.    NPO Detail:  No data recorded     Physical Exam    Airway  Mallampati: II  TM distance: >3 FB  Neck ROM: full  Mouth opening: 3 or more finger widths     Cardiovascular    Dental - normal exam     Pulmonary    Abdominal            Anesthesia Plan    History of general anesthesia?: yes  History of complications of general anesthesia?: no    ASA 3     MAC     The patient is not a current smoker.    intravenous induction   Anesthetic plan and risks discussed with patient.             [1]   Past Surgical History:  Procedure Laterality Date    APPENDECTOMY      CARDIAC CATHETERIZATION      CARDIAC SURGERY      bypass    CARPAL TUNNEL RELEASE Left 02/2025    COLONOSCOPY      CORONARY ARTERY BYPASS GRAFT      TRIGGER FINGER RELEASE Left 2024   [2]   Past Medical History:  Diagnosis Date    Arthritis     Bitten or stung by nonvenomous insect and other nonvenomous arthropods, initial encounter 10/21/2019    Tick bite    CHF (congestive heart failure)     CKD (chronic kidney disease)     Coronary artery disease     Cutaneous abscess of left upper limb 06/03/2015    Abscess of arm, left    Disorder of the skin and subcutaneous tissue, unspecified 07/30/2020    Changing skin lesion    Essential hypertension 05/19/2010    Fatigue 06/27/2024    Heart disease     Hyperlipidemia     Joint pain     Myocardial infarction (Multi)     Personal history of other diseases of urinary system 01/28/2019    History of hematuria    Personal history of other specified conditions 04/16/2019    History of chronic fatigue    Prepatellar bursitis, unspecified knee 10/30/2018    Prepatellar bursitis    Renal glomerular disease 07/13/2023    Type 2 diabetes mellitus     Unspecified renal colic 01/28/2019    Kidney pain    Urethral stricture 06/27/2024   [3]   Current Outpatient Medications:     amLODIPine (Norvasc) 10 mg tablet, Take 1 tablet (10 mg) by mouth once daily., Disp: 90 tablet, Rfl: 3    aspirin  "81 mg chewable tablet, Chew once daily., Disp: , Rfl:     atorvastatin (Lipitor) 80 mg tablet, TAKE 1 TABLET BY MOUTH ONCE DAILY AT BEDTIME, Disp: 90 tablet, Rfl: 0    clopidogrel (Plavix) 75 mg tablet, Take 1 tablet by mouth once daily, Disp: 90 tablet, Rfl: 0    Farxiga 10 mg tablet, Take 1 tablet by mouth once daily, Disp: 90 tablet, Rfl: 0    FreeStyle Evy 2 Sensor kit, USE AS DIRECTED -CHANGE  SENSOR  EVERY  14  DAYS, Disp: 6 each, Rfl: 0    furosemide (Lasix) 40 mg tablet, Take 1 tablet (40 mg) by mouth once daily., Disp: 90 tablet, Rfl: 3    insulin lispro (HumaLOG) 100 unit/mL pen, INJECT 8 UNITS SUBCUTANEOUSLY 3 TIMES DAILY PLUS SLIDING SCALE. 151-200, 2 UNITS. 201-250, 4 UNITS. 251-300, 6 UNITS. 301-350, 8 UNITS. 351-400, 10 UNITS. MAX MEAL DOSE 18 UNITS. MAX DAILY 54 UNITS, Disp: 10 mL, Rfl: 2    Lantus Solostar U-100 Insulin 100 unit/mL (3 mL) pen, INJECT 35 UNITS SUBCUTANEOUSLY AT BEDTIME, Disp: 30 mL, Rfl: 0    lisinopril 40 mg tablet, Take 1 tablet (40 mg) by mouth once daily. for blood pressure, Disp: , Rfl:     methocarbamol (Robaxin) 750 mg tablet, Take 1 tablet (750 mg) by mouth once daily at bedtime for 10 days., Disp: 10 tablet, Rfl: 0    metoprolol succinate XL (Toprol-XL) 200 mg 24 hr tablet, Take 1 tablet by mouth once daily, Disp: 90 tablet, Rfl: 0    multivitamin tablet, Take 1 tablet by mouth once daily., Disp: , Rfl:     pen needle, diabetic 31 gauge x 5/16\" needle, USE 1 NEW PEN NEEDLE 4 TIMES DAILY, Disp: 400 each, Rfl: 0    Semglee,insulin glarg-yfgn,Pen 100 unit/mL (3 mL) pen, INJECT 35 UNITS SUBCUTANEOUSLY AT BEDTIME, Disp: 30 mL, Rfl: 0    spironolactone (Aldactone) 25 mg tablet, Take 1 tablet (25 mg) by mouth once daily., Disp: 90 tablet, Rfl: 3    tadalafil (Cialis) 10 mg tablet, Take 1 tablet (10 mg) by mouth once daily., Disp: 30 tablet, Rfl: 1  [4]   Allergies  Allergen Reactions    Bee Venom Protein (Honey Bee) Hives and Itching     Hives from Hornets    Naproxen Sodium " Itching

## 2025-06-09 ENCOUNTER — PRE-ADMISSION TESTING (OUTPATIENT)
Dept: PREADMISSION TESTING | Facility: HOSPITAL | Age: 75
End: 2025-06-09
Payer: MEDICARE

## 2025-06-10 ENCOUNTER — PRE-ADMISSION TESTING (OUTPATIENT)
Dept: PREADMISSION TESTING | Facility: HOSPITAL | Age: 75
End: 2025-06-10
Payer: MEDICARE

## 2025-06-10 VITALS — WEIGHT: 190 LBS | HEIGHT: 68 IN | BODY MASS INDEX: 28.79 KG/M2

## 2025-06-10 ASSESSMENT — DUKE ACTIVITY SCORE INDEX (DASI)
CAN YOU RUN A SHORT DISTANCE: NO
TOTAL_SCORE: 36.7
CAN YOU TAKE CARE OF YOURSELF (EAT, DRESS, BATHE, OR USE TOILET): YES
CAN YOU DO MODERATE WORK AROUND THE HOUSE LIKE VACUUMING, SWEEPING FLOORS OR CARRYING GROCERIES: YES
CAN YOU DO LIGHT WORK AROUND THE HOUSE LIKE DUSTING OR WASHING DISHES: YES
CAN YOU PARTICIPATE IN STRENOUS SPORTS LIKE SWIMMING, SINGLES TENNIS, FOOTBALL, BASKETBALL, OR SKIING: NO
CAN YOU PARTICIPATE IN MODERATE RECREATIONAL ACTIVITIES LIKE GOLF, BOWLING, DANCING, DOUBLES TENNIS OR THROWING A BASEBALL OR FOOTBALL: NO
CAN YOU WALK A BLOCK OR TWO ON LEVEL GROUND: YES
CAN YOU DO YARD WORK LIKE RAKING LEAVES, WEEDING OR PUSHING A MOWER: YES
DASI METS SCORE: 7.3
CAN YOU DO HEAVY WORK AROUND THE HOUSE LIKE SCRUBBING FLOORS OR LIFTING AND MOVING HEAVY FURNITURE: YES
CAN YOU WALK INDOORS, SUCH AS AROUND YOUR HOUSE: YES
CAN YOU HAVE SEXUAL RELATIONS: YES
CAN YOU CLIMB A FLIGHT OF STAIRS OR WALK UP A HILL: YES

## 2025-06-10 NOTE — PREPROCEDURE INSTRUCTIONS
Medication List            Accurate as of Deepa 10, 2025 11:20 AM. Always use your most recent med list.                amLODIPine 10 mg tablet  Commonly known as: Norvasc  Take 1 tablet (10 mg) by mouth once daily.  Medication Adjustments for Surgery: Take on the morning of surgery     aspirin 81 mg chewable tablet  Medication Adjustments for Surgery: Take last dose 1 day (24 hours) before surgery     atorvastatin 80 mg tablet  Commonly known as: Lipitor  TAKE 1 TABLET BY MOUTH ONCE DAILY AT BEDTIME  Medication Adjustments for Surgery: Take last dose 1 day (24 hours) before surgery     clopidogrel 75 mg tablet  Commonly known as: Plavix  Take 1 tablet by mouth once daily  Additional Medication Adjustments for Surgery: Take last dose 7 days before surgery     Farxiga 10 mg tablet  Generic drug: dapagliflozin propanediol  Take 1 tablet by mouth once daily  Medication Adjustments for Surgery: Take last dose 3 days before surgery     FreeStyle Evy 2 Sensor kit  Generic drug: flash glucose sensor kit  USE AS DIRECTED -CHANGE  SENSOR  EVERY  14  DAYS     furosemide 40 mg tablet  Commonly known as: Lasix  Take 1 tablet (40 mg) by mouth once daily.  Medication Adjustments for Surgery: Take on the morning of surgery     insulin lispro 100 unit/mL pen  Commonly known as: HumaLOG  INJECT 8 UNITS SUBCUTANEOUSLY 3 TIMES DAILY PLUS SLIDING SCALE. 151-200, 2 UNITS. 201-250, 4 UNITS. 251-300, 6 UNITS. 301-350, 8 UNITS. 351-400, 10 UNITS. MAX MEAL DOSE 18 UNITS. MAX DAILY 54 UNITS  Additional Medication Adjustments for Surgery: Other (Comment)     Lantus Solostar U-100 Insulin 100 unit/mL (3 mL) pen  Generic drug: insulin glargine  INJECT 35 UNITS SUBCUTANEOUSLY AT BEDTIME  Additional Medication Adjustments for Surgery: Take half of your usual dose the night before     lisinopril 40 mg tablet  Medication Adjustments for Surgery: Take last dose 1 day (24 hours) before surgery     methocarbamol 750 mg tablet  Commonly known as:  "Robaxin  Take 1 tablet (750 mg) by mouth once daily at bedtime for 10 days.  Medication Adjustments for Surgery: Take last dose 1 day (24 hours) before surgery     metoprolol succinate  mg 24 hr tablet  Commonly known as: Toprol-XL  Take 1 tablet by mouth once daily  Medication Adjustments for Surgery: Take on the morning of surgery     multivitamin tablet  Additional Medication Adjustments for Surgery: Take last dose 5 days before surgery     pen needle, diabetic 31 gauge x 5/16\" needle  USE 1 NEW PEN NEEDLE 4 TIMES DAILY     Semglee(insulin glarg-yfgn)Pen 100 unit/mL (3 mL) pen  Generic drug: insulin glargine-yfgn  INJECT 35 UNITS SUBCUTANEOUSLY AT BEDTIME  Additional Medication Adjustments for Surgery: Take half of your usual dose the night before     spironolactone 25 mg tablet  Commonly known as: Aldactone  Take 1 tablet (25 mg) by mouth once daily.  Medication Adjustments for Surgery: Take on the morning of surgery     tadalafil 10 mg tablet  Commonly known as: Cialis  Take 1 tablet (10 mg) by mouth once daily.  Medication Adjustments for Surgery: Take last dose 2 days before surgery                              NPO Instructions:    Follow instructions:  5 days before your procedure eat a low fiber diet. No seeds, nuts, beans, raw veggies, corn, popcorn or whole grains.  Day before procedure-may have light breakfast by 9am (ex. 1 egg, 1 piece of toast).  Then CLEAR LIQUIDS ONLY-No dairy products, nothing red/purple.  Take first part of prep- Evening Before Procedure.  Drink lots of liquids.  Day of Procedure- 3-4am Take Second Part of Prep.    After Midnight the only Clear Liquid allowed is: Water, Black Coffee, Tea, Gatorade and Clear Soda.  STOP DRINKING 3 HOURS PRIOR TO PROCEDURE.  Take medications as instructed.   No Gum, Candy, Mints or Smoking day of the procedure!     Additional Instructions:     Review your medication instructions, take indicated medications  Wear  comfortable loose fitting " clothing  All jewelry and valuables should be left at home    Park in back of hospital by ER. Come up to Second floor-Outpt dept to check in.  Bring Photo ID and Insurance card,   You MUST have a  with you.  No more than 2 visitors with you please.  There is construction around the hospital- best to take [address] [facility] to the hospital.     If you get ill at all before your procedure- CALL YOUR DOCTOR/SURGEON.  We want you in the best shape that is possible. Any sickness might lead to your procedure being delayed.      Call Outpatient dept at [phone] the night before your procedure (Friday for Monday procedure), between 1-3 pm.     **If you start any new medications, especially for weight loss or antibiotics-let us know. Outpatient dept number is [phone] (M-F 7-3:30p).      Remember to Hold PLAVIX/Clopigrel 7 days (Stop on Monday, June 23.  Hold Farxiga 3 days before- (Stop Friday, June 27)

## 2025-06-11 ENCOUNTER — TELEPHONE (OUTPATIENT)
Dept: CARDIOLOGY | Facility: HOSPITAL | Age: 75
End: 2025-06-11
Payer: MEDICARE

## 2025-06-18 ENCOUNTER — TELEPHONE (OUTPATIENT)
Dept: SURGERY | Facility: CLINIC | Age: 75
End: 2025-06-18
Payer: MEDICARE

## 2025-06-18 DIAGNOSIS — I25.810 CORONARY ARTERY DISEASE INVOLVING CORONARY BYPASS GRAFT OF NATIVE HEART, UNSPECIFIED WHETHER ANGINA PRESENT: ICD-10-CM

## 2025-06-18 RX ORDER — METOPROLOL SUCCINATE 200 MG/1
200 TABLET, EXTENDED RELEASE ORAL DAILY
Qty: 90 TABLET | Refills: 0 | Status: SHIPPED | OUTPATIENT
Start: 2025-06-18

## 2025-06-18 NOTE — TELEPHONE ENCOUNTER
LVM that he is cleared for procedure on 6/30/25 with Dr. Tang stop Plavix 3 days before but to stay on Apirin

## 2025-06-26 DIAGNOSIS — I25.810 CORONARY ARTERY DISEASE INVOLVING CORONARY BYPASS GRAFT OF NATIVE HEART, UNSPECIFIED WHETHER ANGINA PRESENT: ICD-10-CM

## 2025-06-27 RX ORDER — ATORVASTATIN CALCIUM 80 MG/1
80 TABLET, FILM COATED ORAL NIGHTLY
Qty: 90 TABLET | Refills: 0 | Status: SHIPPED | OUTPATIENT
Start: 2025-06-27

## 2025-06-30 ENCOUNTER — HOSPITAL ENCOUNTER (OUTPATIENT)
Dept: GASTROENTEROLOGY | Facility: HOSPITAL | Age: 75
Discharge: HOME | End: 2025-06-30
Payer: MEDICARE

## 2025-06-30 ENCOUNTER — ANESTHESIA (OUTPATIENT)
Dept: GASTROENTEROLOGY | Facility: HOSPITAL | Age: 75
End: 2025-06-30
Payer: MEDICARE

## 2025-06-30 VITALS
WEIGHT: 190 LBS | SYSTOLIC BLOOD PRESSURE: 125 MMHG | TEMPERATURE: 97.3 F | HEART RATE: 68 BPM | RESPIRATION RATE: 18 BRPM | HEIGHT: 68 IN | DIASTOLIC BLOOD PRESSURE: 87 MMHG | BODY MASS INDEX: 28.79 KG/M2 | OXYGEN SATURATION: 99 %

## 2025-06-30 DIAGNOSIS — Z12.11 ENCOUNTER FOR SCREENING FOR MALIGNANT NEOPLASM OF COLON: ICD-10-CM

## 2025-06-30 PROCEDURE — G0121 COLON CA SCRN NOT HI RSK IND: HCPCS | Performed by: SURGERY

## 2025-06-30 PROCEDURE — 7100000009 HC PHASE TWO TIME - INITIAL BASE CHARGE

## 2025-06-30 PROCEDURE — 2500000004 HC RX 250 GENERAL PHARMACY W/ HCPCS (ALT 636 FOR OP/ED): Performed by: LICENSED PRACTICAL NURSE

## 2025-06-30 PROCEDURE — 7100000010 HC PHASE TWO TIME - EACH INCREMENTAL 1 MINUTE

## 2025-06-30 PROCEDURE — 3700000001 HC GENERAL ANESTHESIA TIME - INITIAL BASE CHARGE

## 2025-06-30 PROCEDURE — 45378 DIAGNOSTIC COLONOSCOPY: CPT | Performed by: SURGERY

## 2025-06-30 PROCEDURE — 3700000002 HC GENERAL ANESTHESIA TIME - EACH INCREMENTAL 1 MINUTE

## 2025-06-30 RX ORDER — PROPOFOL 10 MG/ML
INJECTION, EMULSION INTRAVENOUS AS NEEDED
Status: DISCONTINUED | OUTPATIENT
Start: 2025-06-30 | End: 2025-06-30

## 2025-06-30 RX ADMIN — PROPOFOL 50 MG: 10 INJECTION, EMULSION INTRAVENOUS at 08:46

## 2025-06-30 RX ADMIN — SODIUM CHLORIDE: 9 INJECTION, SOLUTION INTRAVENOUS at 08:42

## 2025-06-30 RX ADMIN — PROPOFOL 30 MG: 10 INJECTION, EMULSION INTRAVENOUS at 08:54

## 2025-06-30 RX ADMIN — PROPOFOL 30 MG: 10 INJECTION, EMULSION INTRAVENOUS at 08:50

## 2025-06-30 SDOH — HEALTH STABILITY: MENTAL HEALTH: CURRENT SMOKER: 0

## 2025-06-30 ASSESSMENT — COLUMBIA-SUICIDE SEVERITY RATING SCALE - C-SSRS
6. HAVE YOU EVER DONE ANYTHING, STARTED TO DO ANYTHING, OR PREPARED TO DO ANYTHING TO END YOUR LIFE?: NO
1. IN THE PAST MONTH, HAVE YOU WISHED YOU WERE DEAD OR WISHED YOU COULD GO TO SLEEP AND NOT WAKE UP?: NO
2. HAVE YOU ACTUALLY HAD ANY THOUGHTS OF KILLING YOURSELF?: NO

## 2025-06-30 ASSESSMENT — PAIN SCALES - GENERAL
PAINLEVEL_OUTOF10: 0 - NO PAIN
PAIN_LEVEL: 0

## 2025-06-30 ASSESSMENT — PAIN - FUNCTIONAL ASSESSMENT
PAIN_FUNCTIONAL_ASSESSMENT: 0-10

## 2025-06-30 NOTE — DISCHARGE INSTRUCTIONS
Patient Instructions after a Colonoscopy      The anesthetics, sedatives or narcotics which were given to you today will be acting in your body for the next 24 hours, so you might feel a little sleepy or groggy.  This feeling should slowly wear off. Carefully read and follow the instructions.     You received sedation today:  - Do not drive or operate any machinery or power tools of any kind.   - No alcoholic beverages today, not even beer or wine.  - Do not make any important decisions or sign any legal documents.  - No over the counter medications that contain alcohol or that may cause drowsiness.  - Do not make any important decisions or sign any legal documents.  - Make sure you have someone with you for first 24 hours.    While it is common to experience mild to moderate abdominal distention, gas, or belching after your procedure, if any of these symptoms occur following discharge from the GI Lab or within one week of having your procedure, call the [facility] to be advised whether a visit to your nearest Urgent Care or Emergency Department is indicated.  Take this paper with you if you go.     - If you develop an allergic reaction to the medications that were given during your procedure such as difficulty breathing, rash, hives, severe nausea, vomiting or lightheadedness.  - If you experience chest pain, shortness of breath, severe abdominal pain, fevers and chills.  -If you develop signs and symptoms of bleeding such as blood in your spit, if your stools turn black, tarry, or bloody  - If you have not urinated within 8 hours following your procedure.  - If your IV site becomes painful, red, inflamed, or looks infected.    If you received a biopsy/polypectomy/sphincterotomy the following instructions apply below:    __ Do not use Aspirin containing products, non-steroidal medications or anti-coagulants for one week following your procedure. (Examples of these types of medications are: Advil,  Arthrotec, Aleve, Coumadin, Ecotrin, Heparin, Ibuprofen, Indocin, Motrin, Naprosyn, Nuprin, Plavix, Vioxx, and Voltarin, or their generic forms.  This list is not all-inclusive.  Check with your physician or pharmacist before resuming medications.)   __ Eat a soft diet today.  Avoid foods that are poorly digested for the next 24 hours.  These foods would include: nuts, beans, lettuce, red meats, and fried foods. Start with liquids and advance your diet as tolerated, gradually work up to eating solids.   __ Do not have a Barium Study or Enema for one week.    Your physician recommends the additional following instructions:    -You have a contact number available for emergencies. The signs and symptoms of potential delayed complications were discussed with you. You may return to normal activities tomorrow.  -Resume your previous diet.  -Continue your present medications.   -We are waiting for your pathology results.  -Your physician has recommended a repeat colonoscopy (date to be determined after pending pathology results are reviewed) for surveillance based on pathology results.  -The findings and recommendations have been discussed with you.  -The findings and recommendations were discussed with your family.  - Please see Medication Reconciliation Form for new medication/medications prescribed.       If you experience any problems or have any questions following discharge from the GI Lab, please call:        Nurse Signature                                                                        Date___________________                                                                            Patient/Responsible Party Signature                                        Date___________________

## 2025-06-30 NOTE — ANESTHESIA POSTPROCEDURE EVALUATION
"Patient: Patel Goncalves \"Tatyana\"    Procedure Summary       Date: 06/30/25 Room / Location: [facility]    Anesthesia Start: 0842 Anesthesia Stop: 0910    Procedure: COLONOSCOPY Diagnosis: Encounter for screening for malignant neoplasm of colon    Scheduled Providers: Cody Tang MD Responsible Provider: JAMES Oliva    Anesthesia Type: MAC ASA Status: 3            Anesthesia Type: MAC    Vitals Value Taken Time   /87 06/30/25 09:20   Temp 36.3 °C (97.3 °F) 06/30/25 09:05   Pulse 68 06/30/25 09:20   Resp 18 06/30/25 09:20   SpO2 99 % 06/30/25 09:20       Anesthesia Post Evaluation    Patient location during evaluation: PACU  Patient participation: complete - patient participated  Level of consciousness: awake and alert  Pain score: 0  Pain management: adequate  Airway patency: patent  Cardiovascular status: acceptable  Respiratory status: acceptable  Hydration status: acceptable  Postoperative Nausea and Vomiting: none        There were no known notable events for this encounter.    "

## 2025-06-30 NOTE — H&P
"History Of Present Illness  Patel Goncalves \"Tatyana\" is a 75 y.o. male presenting for a colonoscopy      Past Medical History  Medical History[1]    Surgical History  Surgical History[2]     Social History  He reports that he has never smoked. He has never used smokeless tobacco. He reports current alcohol use. He reports that he does not use drugs.    Family History  Family History[3]     Allergies  Bee venom protein (honey bee) and Naproxen sodium    Review of Systems   All other systems reviewed and are negative.       Physical Exam  Vitals reviewed.   Constitutional:       Appearance: Normal appearance.   HENT:      Head: Normocephalic.   Cardiovascular:      Rate and Rhythm: Normal rate and regular rhythm.      Heart sounds: Normal heart sounds.   Pulmonary:      Effort: Pulmonary effort is normal.      Breath sounds: Normal breath sounds.   Abdominal:      General: Abdomen is flat. There is no distension.      Palpations: Abdomen is soft. There is no mass.      Tenderness: There is no abdominal tenderness. There is no guarding.   Musculoskeletal:         General: Normal range of motion.   Skin:     General: Skin is warm.   Neurological:      General: No focal deficit present.   Psychiatric:         Mood and Affect: Mood normal.          Last Recorded Vitals  Blood pressure 147/69, pulse 80, temperature 36.2 °C (97.2 °F), temperature source Temporal, resp. rate 17, height 1.727 m (5' 8\"), weight 86.2 kg (190 lb), SpO2 97%.         Assessment & Plan  Encounter for screening for malignant neoplasm of colon      COLONOSCOPY/BIOPSIES.  Risks include, but not limited to pain, infection, bleeding, perforation, missed lesions, aspiration, risk of cardiac, pulmonary, neurologic, locomotor, anesthetic events, incomplete colonoscopy, and other unforeseen complications including death      Cody Tang MD         [1]   Past Medical History:  Diagnosis Date    Arthritis     Bitten or stung by nonvenomous insect and " other nonvenomous arthropods, initial encounter 10/21/2019    Tick bite    CHF (congestive heart failure)     CKD (chronic kidney disease)     Coronary artery disease     Cutaneous abscess of left upper limb 06/03/2015    Abscess of arm, left    Disorder of the skin and subcutaneous tissue, unspecified 07/30/2020    Changing skin lesion    Essential hypertension 05/19/2010    Fatigue 06/27/2024    Heart disease     Hyperlipidemia     Joint pain     Myocardial infarction (Multi)     2022    Personal history of other diseases of urinary system 01/28/2019    History of hematuria    Personal history of other specified conditions 04/16/2019    History of chronic fatigue    Prepatellar bursitis, unspecified knee 10/30/2018    Prepatellar bursitis    Renal glomerular disease 07/13/2023    Type 2 diabetes mellitus     Unspecified renal colic 01/28/2019    Kidney pain    Urethral stricture 06/27/2024   [2]   Past Surgical History:  Procedure Laterality Date    APPENDECTOMY      CARDIAC CATHETERIZATION      CARDIAC SURGERY      bypass 2022    CARPAL TUNNEL RELEASE Left 02/2025    COLONOSCOPY      CORONARY ARTERY BYPASS GRAFT      TRIGGER FINGER RELEASE Left 2024   [3]   Family History  Problem Relation Name Age of Onset    No Known Problems Mother      No Known Problems Father      Aortic aneurysm Brother      Diabetes Maternal Grandmother

## 2025-07-01 ASSESSMENT — PAIN SCALES - GENERAL: PAINLEVEL_OUTOF10: 0 - NO PAIN

## 2025-07-07 DIAGNOSIS — E11.69 TYPE 2 DIABETES MELLITUS WITH OTHER SPECIFIED COMPLICATION, UNSPECIFIED WHETHER LONG TERM INSULIN USE (MULTI): ICD-10-CM

## 2025-07-07 RX ORDER — FLASH GLUCOSE SENSOR
KIT MISCELLANEOUS
Qty: 6 EACH | Refills: 0 | Status: SHIPPED | OUTPATIENT
Start: 2025-07-07

## 2025-07-11 ENCOUNTER — APPOINTMENT (OUTPATIENT)
Dept: PRIMARY CARE | Facility: CLINIC | Age: 75
End: 2025-07-11
Payer: MEDICARE

## 2025-07-11 VITALS
HEART RATE: 65 BPM | BODY MASS INDEX: 29.25 KG/M2 | OXYGEN SATURATION: 93 % | WEIGHT: 193 LBS | HEIGHT: 68 IN | DIASTOLIC BLOOD PRESSURE: 75 MMHG | SYSTOLIC BLOOD PRESSURE: 120 MMHG | RESPIRATION RATE: 18 BRPM

## 2025-07-11 DIAGNOSIS — I50.43 ACUTE ON CHRONIC COMBINED SYSTOLIC AND DIASTOLIC HEART FAILURE: ICD-10-CM

## 2025-07-11 DIAGNOSIS — I15.9 SECONDARY HYPERTENSION: ICD-10-CM

## 2025-07-11 DIAGNOSIS — I48.91 ATRIAL FIBRILLATION, UNSPECIFIED TYPE (MULTI): ICD-10-CM

## 2025-07-11 DIAGNOSIS — Z95.1 S/P CABG (CORONARY ARTERY BYPASS GRAFT): ICD-10-CM

## 2025-07-11 DIAGNOSIS — E11.9 TYPE 2 DIABETES MELLITUS WITHOUT COMPLICATION, WITH LONG-TERM CURRENT USE OF INSULIN: ICD-10-CM

## 2025-07-11 DIAGNOSIS — N18.32 STAGE 3B CHRONIC KIDNEY DISEASE (MULTI): ICD-10-CM

## 2025-07-11 DIAGNOSIS — Z79.4 TYPE 2 DIABETES MELLITUS WITHOUT COMPLICATION, WITH LONG-TERM CURRENT USE OF INSULIN: ICD-10-CM

## 2025-07-11 DIAGNOSIS — Z00.00 HEALTH CARE MAINTENANCE: ICD-10-CM

## 2025-07-11 PROBLEM — N18.9 CKD (CHRONIC KIDNEY DISEASE): Status: RESOLVED | Noted: 2023-10-26 | Resolved: 2025-07-11

## 2025-07-11 LAB — POC HEMOGLOBIN A1C: 8.4 % (ref 4.2–6.5)

## 2025-07-11 PROCEDURE — 99213 OFFICE O/P EST LOW 20 MIN: CPT | Performed by: INTERNAL MEDICINE

## 2025-07-11 PROCEDURE — 1159F MED LIST DOCD IN RCRD: CPT | Performed by: INTERNAL MEDICINE

## 2025-07-11 PROCEDURE — G2211 COMPLEX E/M VISIT ADD ON: HCPCS | Performed by: INTERNAL MEDICINE

## 2025-07-11 PROCEDURE — 3074F SYST BP LT 130 MM HG: CPT | Performed by: INTERNAL MEDICINE

## 2025-07-11 PROCEDURE — 3078F DIAST BP <80 MM HG: CPT | Performed by: INTERNAL MEDICINE

## 2025-07-11 PROCEDURE — 1160F RVW MEDS BY RX/DR IN RCRD: CPT | Performed by: INTERNAL MEDICINE

## 2025-07-11 PROCEDURE — 1036F TOBACCO NON-USER: CPT | Performed by: INTERNAL MEDICINE

## 2025-07-11 PROCEDURE — 1126F AMNT PAIN NOTED NONE PRSNT: CPT | Performed by: INTERNAL MEDICINE

## 2025-07-11 PROCEDURE — 83036 HEMOGLOBIN GLYCOSYLATED A1C: CPT | Performed by: INTERNAL MEDICINE

## 2025-07-11 ASSESSMENT — ENCOUNTER SYMPTOMS
PSYCHIATRIC NEGATIVE: 1
CARDIOVASCULAR NEGATIVE: 1
ENDOCRINE NEGATIVE: 1
GASTROINTESTINAL NEGATIVE: 1
MUSCULOSKELETAL NEGATIVE: 1
EYES NEGATIVE: 1
RESPIRATORY NEGATIVE: 1
NEUROLOGICAL NEGATIVE: 1
CONSTITUTIONAL NEGATIVE: 1

## 2025-07-11 ASSESSMENT — PAIN SCALES - GENERAL: PAINLEVEL_OUTOF10: 0-NO PAIN

## 2025-07-11 NOTE — PATIENT INSTRUCTIONS
It was nice to see you in the office today!  As discussed during our visit...     Your HgbA1c was 8.4 today. It was 8.6 last time.

## 2025-07-11 NOTE — ASSESSMENT & PLAN NOTE
HgbA1c 8.6 on 11/6/2024 (was 9.2 prior) --> DUE 8.4 today  - c/w humalog 8u TID + SSI  - c/w lantus 35u at bedtime  - continue with farixga 10mg QD  - Patient educated about hypoglycemia and hyperglycemia  - advised 1800 ADA diet  - Education and counseling was done  - Annual Diabetic retinopathy exam  - Annual Podiatry foot exam and self check every day.  - Regular exercise is encouraged  - Regular home monitoring of the blood sugar is advised  - Advised to take the medication as advised and compliance as needed.

## 2025-07-11 NOTE — ASSESSMENT & PLAN NOTE
#CAD s/p CABG x4 on 11/28/2022 with Dr Ewelina Trujillo   - c/w statin and plavix, baby ASA  - c/w amlodipine, lasix, metoprolol, lisinopril, aldactone  Visit Vitals  /75 (BP Location: Left arm, Patient Position: Sitting)   Pulse 65   Resp 18    - All strategies to keep the blood pressure down is explained to the patient  - Regular exercise and blood pressure monitoring is encouraged

## 2025-07-11 NOTE — ASSESSMENT & PLAN NOTE
-  Preventive screening / Vaccination(s) reviewed and discussed  -  Recommended regular aerobic exercise and proper diet.  -  Discussed need and benefit for weight management  -  Medications were reviewed, list was updated, and refills given if needed.  -  will order labwork at 4 month FU

## 2025-07-11 NOTE — PROGRESS NOTES
"Patient ID:   Patel Goncalves \"Mckenna" is a 75 y.o. male with PMH remarkable for CAD s/p CABG x4 on 11/28/2022 with Dr Ewelina Trujillo, DM2, Gout, HTN, CKD who presents to the office today for Follow-up (4 month).    HEALTH MAINTENANCE: Follow Up  Last Office Visit: 3/5/2025 for annual MWV. HgbA1c was 8.4 today. Colonoscopy was ordered.   Last Labs: CBC+BMP 1/30/2025, others on 3/21/2024 --> DUE @ next visit  HgbA1c 8.6 on 11/6/2024 (was 9.2 prior) --> DUE 8.4 today  PSA Screening (starting at age 55 till 69): declines and now out of age range  Colonoscopy (45-75 or age 40 with 1st degree relative dx colon ca): 6/30/2025 with Dr Tang which was normal. No repeat necessary.  Lung cancer screening (50-76 y/o x 20 pk yr for at least 20 yrs + current smoker OR quit in last 15 years, no CT w/I last year): no sizable nodules noted on scan from 2022.  ECHO 3/3/2023 showed LVSF 40-45%, septal motion consistent with postOp status, abnormal pattern of LVDF, low normal RVSF, mild AV stenosis. Mild MR, TR. PAP mildly elevated.     Patient denies any fevers, chills, fatigue, lymphadenopathy, headache, chest pain, dyspnea, cough, n/v/c/d, abd pain, dysuria, rash, changes in weight or appetite.   Waking up 1x to urinate. Staying active around the house. Sleeping and eating ok.    Following Providers:  Urology: Dr Bajwa  Nephrology: Dr Baer  Cardio: MADISYN Fallon    Social History[1]    Review of Systems   Constitutional: Negative.    HENT: Negative.     Eyes: Negative.    Respiratory: Negative.     Cardiovascular: Negative.    Gastrointestinal: Negative.    Endocrine: Negative.    Genitourinary: Negative.    Musculoskeletal: Negative.    Skin: Negative.    Neurological: Negative.    Psychiatric/Behavioral: Negative.     All other systems reviewed and are negative.    Visit Vitals  /75 (BP Location: Left arm, Patient Position: Sitting)   Pulse 65   Resp 18   Ht 1.727 m (5' 8\")   Wt 87.5 kg (193 lb)   SpO2 93%   BMI 29.35 " kg/m²   Smoking Status Never   BSA 2.05 m²     Physical Exam  Vitals reviewed. Exam conducted with a chaperone present.   Constitutional:       Appearance: Normal appearance. He is well-developed.   HENT:      Head: Normocephalic.      Right Ear: External ear normal.      Left Ear: External ear normal.      Nose: Nose normal.      Mouth/Throat:      Lips: Pink.      Mouth: Mucous membranes are moist.   Eyes:      General: Lids are normal.      Pupils: Pupils are equal, round, and reactive to light.   Neck:      Trachea: Trachea normal.   Cardiovascular:      Rate and Rhythm: Normal rate and regular rhythm.      Heart sounds: Murmur heard.   Pulmonary:      Effort: Pulmonary effort is normal.      Breath sounds: Normal breath sounds.   Abdominal:      General: Bowel sounds are normal.      Palpations: Abdomen is soft.   Skin:     General: Skin is warm and moist.   Neurological:      General: No focal deficit present.      Mental Status: He is alert and oriented to person, place, and time. Mental status is at baseline.   Psychiatric:         Attention and Perception: Attention normal.         Mood and Affect: Mood normal.         Speech: Speech normal.         Behavior: Behavior is cooperative.         Thought Content: Thought content normal.         Cognition and Memory: Cognition normal.         Judgment: Judgment normal.       Current Outpatient Medications   Medication Instructions    amLODIPine (NORVASC) 10 mg, oral, Daily    aspirin 81 mg chewable tablet Daily    atorvastatin (LIPITOR) 80 mg, oral, Nightly    clopidogrel (PLAVIX) 75 mg, oral, Daily    Farxiga 10 mg, oral, Daily    FreeStyle Evy 2 Sensor kit USE AS DIRECTED -CHANGE  SENSOR  EVERY  14  DAYS    furosemide (LASIX) 40 mg, oral, Daily    insulin lispro (HumaLOG) 100 unit/mL pen INJECT 8 UNITS SUBCUTANEOUSLY 3 TIMES DAILY PLUS SLIDING SCALE. 151-200, 2 UNITS. 201-250, 4 UNITS. 251-300, 6 UNITS. 301-350, 8 UNITS. 351-400, 10 UNITS. MAX MEAL DOSE 18  "UNITS. MAX DAILY 54 UNITS    Lantus Solostar U-100 Insulin 100 unit/mL (3 mL) pen INJECT 35 UNITS SUBCUTANEOUSLY AT BEDTIME    lisinopril 40 mg, Daily    metoprolol succinate XL (TOPROL-XL) 200 mg, oral, Daily    multivitamin tablet 1 tablet, Daily    pen needle, diabetic 31 gauge x 5/16\" needle USE 1 NEW PEN NEEDLE 4 TIMES DAILY    spironolactone (ALDACTONE) 25 mg, oral, Daily    tadalafil (CIALIS) 10 mg, oral, Daily      Lab Results   Component Value Date    WBC 7.2 01/30/2025    HGB 17.2 01/30/2025    HCT 51.4 01/30/2025     01/30/2025    CHOL 86 07/18/2023    TRIG 155 (H) 07/18/2023    HDL 36.2 (A) 07/18/2023    ALT 32 08/14/2023    AST 24 08/14/2023     01/30/2025    K 4.8 01/30/2025     01/30/2025    CREATININE 1.49 (H) 01/30/2025    BUN 28 (H) 01/30/2025    CO2 27 01/30/2025    TSH 1.81 10/20/2022    INR 1.2 (H) 10/18/2022    HGBA1C 8.4 (A) 07/11/2025       Problem List Items Addressed This Visit           ICD-10-CM    Diabetes mellitus, type II (Multi) E11.9    HgbA1c 8.6 on 11/6/2024 (was 9.2 prior) --> DUE 8.4 today  - c/w humalog 8u TID + SSI  - c/w lantus 35u at bedtime  - continue with farixga 10mg QD  - Patient educated about hypoglycemia and hyperglycemia  - advised 1800 ADA diet  - Education and counseling was done  - Annual Diabetic retinopathy exam  - Annual Podiatry foot exam and self check every day.  - Regular exercise is encouraged  - Regular home monitoring of the blood sugar is advised  - Advised to take the medication as advised and compliance as needed.         Relevant Orders    POCT glycosylated hemoglobin (Hb A1C) manually resulted (Completed)    Secondary hypertension I15.9    - c/w amlodipine, lasix 40mg every day, lisinopril, metoprolol, aldactone  Visit Vitals  /75 (BP Location: Left arm, Patient Position: Sitting)   Pulse 65   Resp 18   - All strategies to keep the blood pressure down is explained to the patient  - Regular exercise and blood pressure " monitoring is encouraged         S/P CABG (coronary artery bypass graft) Z95.1    #CAD s/p CABG x4 on 11/28/2022 with Dr Ewelina Trujillo   - c/w statin and plavix, baby ASA  - c/w amlodipine, lasix, metoprolol, lisinopril, aldactone  Visit Vitals  /75 (BP Location: Left arm, Patient Position: Sitting)   Pulse 65   Resp 18    - All strategies to keep the blood pressure down is explained to the patient  - Regular exercise and blood pressure monitoring is encouraged         Stage 3b chronic kidney disease (Multi) N18.32    - FU with Dr Baer as scheduled  - monitoring BUN and creat         Atrial fibrillation (Multi) I48.91    - c/w metoprolol for rate control  - FU with cardio as scheduled  - not on OAC         Health care maintenance Z00.00    -  Preventive screening / Vaccination(s) reviewed and discussed  -  Recommended regular aerobic exercise and proper diet.  -  Discussed need and benefit for weight management  -  Medications were reviewed, list was updated, and refills given if needed.  -  will order labwork at 4 month FU            --------------------  Written by Melody De Leon RN, acting as a scribe for Dr. Hidalgo. This note accurately reflects the work and decisions made by Dr. Hidalgo.     I, Dr. Hidalgo, attest all medical record entries made by the scribe were under my direction and were personally dictated by me. I have reviewed the chart and agree that the record accurately reflects my performance of the history, physical exam, and assessment and plan.          [1]   Social History  Tobacco Use    Smoking status: Never    Smokeless tobacco: Never   Vaping Use    Vaping status: Never Used   Substance Use Topics    Alcohol use: Yes     Comment: Occasional- 2 x month    Drug use: Never

## 2025-07-11 NOTE — ASSESSMENT & PLAN NOTE
- c/w amlodipine, lasix 40mg every day, lisinopril, metoprolol, aldactone  Visit Vitals  /75 (BP Location: Left arm, Patient Position: Sitting)   Pulse 65   Resp 18   - All strategies to keep the blood pressure down is explained to the patient  - Regular exercise and blood pressure monitoring is encouraged

## 2025-07-19 DIAGNOSIS — I10 ESSENTIAL HYPERTENSION: ICD-10-CM

## 2025-07-21 RX ORDER — AMLODIPINE BESYLATE 10 MG/1
10 TABLET ORAL DAILY
Qty: 90 TABLET | Refills: 2 | Status: SHIPPED | OUTPATIENT
Start: 2025-07-21

## 2025-07-25 DIAGNOSIS — I10 UNCONTROLLED HYPERTENSION: ICD-10-CM

## 2025-07-26 RX ORDER — SPIRONOLACTONE 25 MG/1
25 TABLET ORAL DAILY
Qty: 90 TABLET | Refills: 0 | Status: SHIPPED | OUTPATIENT
Start: 2025-07-26

## 2025-08-01 DIAGNOSIS — E11.9 TYPE 2 DIABETES MELLITUS WITHOUT COMPLICATION, WITH LONG-TERM CURRENT USE OF INSULIN: ICD-10-CM

## 2025-08-01 DIAGNOSIS — Z79.4 TYPE 2 DIABETES MELLITUS WITHOUT COMPLICATION, WITH LONG-TERM CURRENT USE OF INSULIN: ICD-10-CM

## 2025-08-01 RX ORDER — DAPAGLIFLOZIN 10 MG/1
10 TABLET, FILM COATED ORAL DAILY
Qty: 90 TABLET | Refills: 0 | Status: SHIPPED | OUTPATIENT
Start: 2025-08-01

## 2025-08-13 DIAGNOSIS — E11.9 TYPE 2 DIABETES MELLITUS WITHOUT COMPLICATION, WITH LONG-TERM CURRENT USE OF INSULIN: ICD-10-CM

## 2025-08-13 DIAGNOSIS — Z79.4 TYPE 2 DIABETES MELLITUS WITHOUT COMPLICATION, WITH LONG-TERM CURRENT USE OF INSULIN: ICD-10-CM

## 2025-08-13 RX ORDER — INSULIN GLARGINE 100 [IU]/ML
35 INJECTION, SOLUTION SUBCUTANEOUS NIGHTLY
Qty: 30 ML | Refills: 1 | Status: SHIPPED | OUTPATIENT
Start: 2025-08-13

## 2025-08-15 ENCOUNTER — APPOINTMENT (OUTPATIENT)
Dept: UROLOGY | Facility: CLINIC | Age: 75
End: 2025-08-15
Payer: MEDICARE

## 2025-08-28 ENCOUNTER — APPOINTMENT (OUTPATIENT)
Dept: UROLOGY | Facility: CLINIC | Age: 75
End: 2025-08-28
Payer: MEDICARE

## 2025-08-28 DIAGNOSIS — N48.9 PENILE ABNORMALITY: Primary | ICD-10-CM

## 2025-08-28 DIAGNOSIS — N48.89 PENILE PAIN: ICD-10-CM

## 2025-08-28 DIAGNOSIS — R39.9 URINARY SYMPTOM OR SIGN: ICD-10-CM

## 2025-08-28 LAB
POC APPEARANCE, URINE: CLEAR
POC BILIRUBIN, URINE: NEGATIVE
POC BLOOD, URINE: NEGATIVE
POC COLOR, URINE: YELLOW
POC GLUCOSE, URINE: ABNORMAL MG/DL
POC KETONES, URINE: NEGATIVE MG/DL
POC LEUKOCYTES, URINE: NEGATIVE
POC NITRITE,URINE: NEGATIVE
POC PH, URINE: 6 PH
POC PROTEIN, URINE: NEGATIVE MG/DL
POC SPECIFIC GRAVITY, URINE: 1.01
POC UROBILINOGEN, URINE: 0.2 EU/DL

## 2025-08-28 PROCEDURE — 1036F TOBACCO NON-USER: CPT | Performed by: NURSE PRACTITIONER

## 2025-08-28 PROCEDURE — 81002 URINALYSIS NONAUTO W/O SCOPE: CPT | Performed by: NURSE PRACTITIONER

## 2025-08-28 PROCEDURE — 99204 OFFICE O/P NEW MOD 45 MIN: CPT | Performed by: NURSE PRACTITIONER

## 2025-08-28 PROCEDURE — 51798 US URINE CAPACITY MEASURE: CPT | Performed by: NURSE PRACTITIONER

## 2025-08-28 PROCEDURE — 1159F MED LIST DOCD IN RCRD: CPT | Performed by: NURSE PRACTITIONER

## 2025-08-28 PROCEDURE — 1160F RVW MEDS BY RX/DR IN RCRD: CPT | Performed by: NURSE PRACTITIONER

## 2025-08-28 PROCEDURE — 1126F AMNT PAIN NOTED NONE PRSNT: CPT | Performed by: NURSE PRACTITIONER

## 2025-08-28 ASSESSMENT — PAIN SCALES - GENERAL: PAINLEVEL_OUTOF10: 0-NO PAIN

## 2025-09-04 DIAGNOSIS — I25.810 CORONARY ARTERY DISEASE INVOLVING CORONARY BYPASS GRAFT OF NATIVE HEART, UNSPECIFIED WHETHER ANGINA PRESENT: ICD-10-CM

## 2025-09-05 RX ORDER — CLOPIDOGREL BISULFATE 75 MG/1
75 TABLET ORAL DAILY
Qty: 90 TABLET | Refills: 0 | Status: SHIPPED | OUTPATIENT
Start: 2025-09-05

## 2025-09-17 ENCOUNTER — APPOINTMENT (OUTPATIENT)
Dept: UROLOGY | Facility: CLINIC | Age: 75
End: 2025-09-17
Payer: MEDICARE

## 2025-11-11 ENCOUNTER — APPOINTMENT (OUTPATIENT)
Dept: PRIMARY CARE | Facility: CLINIC | Age: 75
End: 2025-11-11
Payer: MEDICARE

## 2026-04-01 ENCOUNTER — APPOINTMENT (OUTPATIENT)
Dept: NEPHROLOGY | Facility: CLINIC | Age: 76
End: 2026-04-01
Payer: MEDICARE

## (undated) DEVICE — CONTAINER, SPECIMEN, 120ML

## (undated) DEVICE — GLOVE, SURGICAL, PROTEXIS PI , 7.5, PF, LF

## (undated) DEVICE — TRAY, DRY PREP, PREMIUM

## (undated) DEVICE — STOCKINETTE, TUBE, BLN, ST, 1 PLY, 4 X 48 IN, LF

## (undated) DEVICE — SOLUTION, CHLORHEXIDINE, 4%, 4OZ

## (undated) DEVICE — SUTURE, VICRYL, 4-0, 27 IN, TF, UNDYED

## (undated) DEVICE — Device

## (undated) DEVICE — DRESSING, NON-ADHERENT, 3 X 3 IN, STERILE

## (undated) DEVICE — SPONGE GAUZE, XRAY SC+RFID, 4X4 16 PLY, STERILE

## (undated) DEVICE — SOLUTION, IRRIGATION, SODIUM CHLORIDE 0.9%, 1000 ML, POUR BOTTLE

## (undated) DEVICE — PREP TRAY, SKIN, DRY, W/GLOVES

## (undated) DEVICE — SUTURE, MONOCRYL, 4-0, 18 IN, PS2, UNDYED

## (undated) DEVICE — SUTURE, PROLENE 4-0, 18IN, PS2, BLUE MONO

## (undated) DEVICE — SUTURE, ETHILON, 4-0, BLK, MONO, PS-2 18

## (undated) DEVICE — BLANKET, LOWER BODY, VHA PLUS, ADULT

## (undated) DEVICE — CUFF, TOURNIQUET, 18 X 4, SNGL PORT/SNGL BLADDER, DISP, LF

## (undated) DEVICE — CUFF, TOURNIQUET, 18 X 4, DUAL PORT/SNGL BLADDER, DISP, LF